# Patient Record
Sex: MALE | Race: BLACK OR AFRICAN AMERICAN | NOT HISPANIC OR LATINO | Employment: UNEMPLOYED | URBAN - METROPOLITAN AREA
[De-identification: names, ages, dates, MRNs, and addresses within clinical notes are randomized per-mention and may not be internally consistent; named-entity substitution may affect disease eponyms.]

---

## 2018-08-31 ENCOUNTER — HOSPITAL ENCOUNTER (INPATIENT)
Facility: OTHER | Age: 23
LOS: 1 days | Discharge: HOME | DRG: 440 | End: 2018-09-01
Attending: EMERGENCY MEDICINE | Admitting: HOSPITALIST
Payer: MEDICAID

## 2018-08-31 DIAGNOSIS — E86.0 DEHYDRATION: ICD-10-CM

## 2018-08-31 DIAGNOSIS — R73.9 HYPERGLYCEMIA: ICD-10-CM

## 2018-08-31 DIAGNOSIS — K85.90 ACUTE PANCREATITIS WITHOUT INFECTION OR NECROSIS, UNSPECIFIED PANCREATITIS TYPE: Primary | ICD-10-CM

## 2018-08-31 DIAGNOSIS — E10.65 TYPE 1 DIABETES MELLITUS WITH HYPERGLYCEMIA: ICD-10-CM

## 2018-08-31 DIAGNOSIS — R11.2 NON-INTRACTABLE VOMITING WITH NAUSEA, UNSPECIFIED VOMITING TYPE: ICD-10-CM

## 2018-08-31 LAB
ALBUMIN SERPL BCP-MCNC: 4.1 G/DL
ALLENS TEST: ABNORMAL
ALP SERPL-CCNC: 74 U/L
ALT SERPL W/O P-5'-P-CCNC: 11 U/L
ANION GAP SERPL CALC-SCNC: 12 MMOL/L
AST SERPL-CCNC: 11 U/L
B-OH-BUTYR BLD STRIP-SCNC: 0.5 MMOL/L
BACTERIA #/AREA URNS HPF: ABNORMAL /HPF
BASOPHILS # BLD AUTO: 0.01 K/UL
BASOPHILS NFR BLD: 0.2 %
BILIRUB SERPL-MCNC: 1.4 MG/DL
BILIRUB UR QL STRIP: NEGATIVE
BUN SERPL-MCNC: 11 MG/DL
CALCIUM SERPL-MCNC: 9.8 MG/DL
CHLORIDE SERPL-SCNC: 95 MMOL/L
CLARITY UR: CLEAR
CO2 SERPL-SCNC: 27 MMOL/L
COLOR UR: YELLOW
CREAT SERPL-MCNC: 1.2 MG/DL
DELSYS: ABNORMAL
DIFFERENTIAL METHOD: ABNORMAL
EOSINOPHIL # BLD AUTO: 0.1 K/UL
EOSINOPHIL NFR BLD: 1.7 %
ERYTHROCYTE [DISTWIDTH] IN BLOOD BY AUTOMATED COUNT: 13.6 %
EST. GFR  (AFRICAN AMERICAN): >60 ML/MIN/1.73 M^2
EST. GFR  (NON AFRICAN AMERICAN): >60 ML/MIN/1.73 M^2
ESTIMATED AVG GLUCOSE: 344 MG/DL
GLUCOSE SERPL-MCNC: 668 MG/DL
GLUCOSE SERPL-MCNC: >500 MG/DL (ref 70–110)
GLUCOSE UR QL STRIP: ABNORMAL
HBA1C MFR BLD HPLC: 13.6 %
HCO3 UR-SCNC: 29.7 MMOL/L (ref 24–28)
HCT VFR BLD AUTO: 39.2 %
HGB BLD-MCNC: 13.5 G/DL
HGB UR QL STRIP: NEGATIVE
KETONES UR QL STRIP: ABNORMAL
LEUKOCYTE ESTERASE UR QL STRIP: NEGATIVE
LIPASE SERPL-CCNC: 924 U/L
LYMPHOCYTES # BLD AUTO: 2.3 K/UL
LYMPHOCYTES NFR BLD: 34.8 %
MCH RBC QN AUTO: 27.6 PG
MCHC RBC AUTO-ENTMCNC: 34.4 G/DL
MCV RBC AUTO: 80 FL
MICROSCOPIC COMMENT: ABNORMAL
MODE: ABNORMAL
MONOCYTES # BLD AUTO: 0.4 K/UL
MONOCYTES NFR BLD: 6.7 %
NEUTROPHILS # BLD AUTO: 3.7 K/UL
NEUTROPHILS NFR BLD: 56.3 %
NITRITE UR QL STRIP: NEGATIVE
PCO2 BLDA: 55.6 MMHG (ref 35–45)
PH SMN: 7.33 [PH] (ref 7.35–7.45)
PH UR STRIP: 6 [PH] (ref 5–8)
PLATELET # BLD AUTO: 263 K/UL
PMV BLD AUTO: 11.6 FL
PO2 BLDA: 45 MMHG (ref 40–60)
POC BE: 4 MMOL/L
POC SATURATED O2: 76 % (ref 95–100)
POCT GLUCOSE: 316 MG/DL (ref 70–110)
POCT GLUCOSE: 326 MG/DL (ref 70–110)
POCT GLUCOSE: 414 MG/DL (ref 70–110)
POCT GLUCOSE: 455 MG/DL (ref 70–110)
POTASSIUM SERPL-SCNC: 4.3 MMOL/L
PROT SERPL-MCNC: 8.3 G/DL
PROT UR QL STRIP: NEGATIVE
RBC # BLD AUTO: 4.9 M/UL
RBC #/AREA URNS HPF: 0 /HPF (ref 0–4)
SAMPLE: ABNORMAL
SITE: ABNORMAL
SODIUM SERPL-SCNC: 134 MMOL/L
SP GR UR STRIP: <=1.005 (ref 1–1.03)
SP02: 100
URN SPEC COLLECT METH UR: ABNORMAL
UROBILINOGEN UR STRIP-ACNC: NEGATIVE EU/DL
WBC # BLD AUTO: 6.61 K/UL
WBC #/AREA URNS HPF: 4 /HPF (ref 0–5)
YEAST URNS QL MICRO: ABNORMAL

## 2018-08-31 PROCEDURE — 99900035 HC TECH TIME PER 15 MIN (STAT)

## 2018-08-31 PROCEDURE — 99222 1ST HOSP IP/OBS MODERATE 55: CPT | Mod: ,,, | Performed by: HOSPITALIST

## 2018-08-31 PROCEDURE — 82010 KETONE BODYS QUAN: CPT

## 2018-08-31 PROCEDURE — 25000003 PHARM REV CODE 250: Performed by: HOSPITALIST

## 2018-08-31 PROCEDURE — 36415 COLL VENOUS BLD VENIPUNCTURE: CPT

## 2018-08-31 PROCEDURE — 93005 ELECTROCARDIOGRAM TRACING: CPT

## 2018-08-31 PROCEDURE — 63600175 PHARM REV CODE 636 W HCPCS: Performed by: EMERGENCY MEDICINE

## 2018-08-31 PROCEDURE — 94761 N-INVAS EAR/PLS OXIMETRY MLT: CPT

## 2018-08-31 PROCEDURE — 83036 HEMOGLOBIN GLYCOSYLATED A1C: CPT

## 2018-08-31 PROCEDURE — 96374 THER/PROPH/DIAG INJ IV PUSH: CPT

## 2018-08-31 PROCEDURE — 93010 ELECTROCARDIOGRAM REPORT: CPT | Mod: ,,, | Performed by: INTERNAL MEDICINE

## 2018-08-31 PROCEDURE — 85025 COMPLETE CBC W/AUTO DIFF WBC: CPT

## 2018-08-31 PROCEDURE — 25000003 PHARM REV CODE 250: Performed by: EMERGENCY MEDICINE

## 2018-08-31 PROCEDURE — 82962 GLUCOSE BLOOD TEST: CPT

## 2018-08-31 PROCEDURE — 96375 TX/PRO/DX INJ NEW DRUG ADDON: CPT

## 2018-08-31 PROCEDURE — 96361 HYDRATE IV INFUSION ADD-ON: CPT

## 2018-08-31 PROCEDURE — 80053 COMPREHEN METABOLIC PANEL: CPT

## 2018-08-31 PROCEDURE — 82803 BLOOD GASES ANY COMBINATION: CPT

## 2018-08-31 PROCEDURE — 81000 URINALYSIS NONAUTO W/SCOPE: CPT

## 2018-08-31 PROCEDURE — 11000001 HC ACUTE MED/SURG PRIVATE ROOM

## 2018-08-31 PROCEDURE — 99285 EMERGENCY DEPT VISIT HI MDM: CPT | Mod: 25

## 2018-08-31 PROCEDURE — 63600175 PHARM REV CODE 636 W HCPCS: Performed by: HOSPITALIST

## 2018-08-31 PROCEDURE — 83690 ASSAY OF LIPASE: CPT

## 2018-08-31 RX ORDER — PROCHLORPERAZINE EDISYLATE 5 MG/ML
5 INJECTION INTRAMUSCULAR; INTRAVENOUS EVERY 6 HOURS PRN
Status: DISCONTINUED | OUTPATIENT
Start: 2018-08-31 | End: 2018-09-01 | Stop reason: HOSPADM

## 2018-08-31 RX ORDER — ONDANSETRON 2 MG/ML
4 INJECTION INTRAMUSCULAR; INTRAVENOUS
Status: COMPLETED | OUTPATIENT
Start: 2018-08-31 | End: 2018-08-31

## 2018-08-31 RX ORDER — IBUPROFEN 200 MG
16 TABLET ORAL
Status: DISCONTINUED | OUTPATIENT
Start: 2018-08-31 | End: 2018-09-01 | Stop reason: HOSPADM

## 2018-08-31 RX ORDER — GLUCAGON 1 MG
1 KIT INJECTION
Status: DISCONTINUED | OUTPATIENT
Start: 2018-08-31 | End: 2018-09-01 | Stop reason: HOSPADM

## 2018-08-31 RX ORDER — INSULIN ASPART 100 [IU]/ML
0-5 INJECTION, SOLUTION INTRAVENOUS; SUBCUTANEOUS
Status: DISCONTINUED | OUTPATIENT
Start: 2018-08-31 | End: 2018-09-01 | Stop reason: HOSPADM

## 2018-08-31 RX ORDER — SODIUM CHLORIDE 0.9 % (FLUSH) 0.9 %
3 SYRINGE (ML) INJECTION
Status: DISCONTINUED | OUTPATIENT
Start: 2018-08-31 | End: 2018-09-01 | Stop reason: HOSPADM

## 2018-08-31 RX ORDER — INSULIN ASPART 100 [IU]/ML
5 INJECTION, SOLUTION INTRAVENOUS; SUBCUTANEOUS
Status: DISCONTINUED | OUTPATIENT
Start: 2018-08-31 | End: 2018-09-01 | Stop reason: HOSPADM

## 2018-08-31 RX ORDER — IBUPROFEN 200 MG
24 TABLET ORAL
Status: DISCONTINUED | OUTPATIENT
Start: 2018-08-31 | End: 2018-09-01 | Stop reason: HOSPADM

## 2018-08-31 RX ORDER — INSULIN LISPRO 100 [IU]/ML
INJECTION, SOLUTION INTRAVENOUS; SUBCUTANEOUS
Status: ON HOLD | COMMUNITY
End: 2020-06-08

## 2018-08-31 RX ORDER — ONDANSETRON 2 MG/ML
4 INJECTION INTRAMUSCULAR; INTRAVENOUS EVERY 8 HOURS PRN
Status: DISCONTINUED | OUTPATIENT
Start: 2018-08-31 | End: 2018-09-01 | Stop reason: HOSPADM

## 2018-08-31 RX ORDER — SODIUM CHLORIDE 9 MG/ML
INJECTION, SOLUTION INTRAVENOUS CONTINUOUS
Status: DISCONTINUED | OUTPATIENT
Start: 2018-08-31 | End: 2018-09-01 | Stop reason: HOSPADM

## 2018-08-31 RX ORDER — MORPHINE SULFATE 4 MG/ML
4 INJECTION, SOLUTION INTRAMUSCULAR; INTRAVENOUS
Status: COMPLETED | OUTPATIENT
Start: 2018-08-31 | End: 2018-08-31

## 2018-08-31 RX ADMIN — INSULIN HUMAN 6 UNITS: 100 INJECTION, SOLUTION PARENTERAL at 07:08

## 2018-08-31 RX ADMIN — INSULIN ASPART 2 UNITS: 100 INJECTION, SOLUTION INTRAVENOUS; SUBCUTANEOUS at 09:08

## 2018-08-31 RX ADMIN — MORPHINE SULFATE 4 MG: 4 INJECTION INTRAVENOUS at 10:08

## 2018-08-31 RX ADMIN — SODIUM CHLORIDE: 0.9 INJECTION, SOLUTION INTRAVENOUS at 03:08

## 2018-08-31 RX ADMIN — ONDANSETRON 4 MG: 2 INJECTION INTRAMUSCULAR; INTRAVENOUS at 08:08

## 2018-08-31 RX ADMIN — SODIUM CHLORIDE: 0.9 INJECTION, SOLUTION INTRAVENOUS at 09:08

## 2018-08-31 RX ADMIN — INSULIN DETEMIR 15 UNITS: 100 INJECTION, SOLUTION SUBCUTANEOUS at 05:08

## 2018-08-31 RX ADMIN — SODIUM CHLORIDE 1000 ML: 0.9 INJECTION, SOLUTION INTRAVENOUS at 07:08

## 2018-08-31 RX ADMIN — SODIUM CHLORIDE 1000 ML: 0.9 INJECTION, SOLUTION INTRAVENOUS at 08:08

## 2018-08-31 NOTE — ED PROVIDER NOTES
Encounter Date: 8/31/2018    SCRIBE #1 NOTE: Sam CALDERA, am scribing for, and in the presence of, Dr. Mendez.       History     Chief Complaint   Patient presents with    Abdominal Pain     pt with abdomin pain and vomiting starting at 4 am . pt with elevated bg this am.     Seen by provider: 7:32 AM    Patient is a 22 y.o. male with a history of DM who presents to the ED with complaint of generalized abdominal pain, which began this morning, approximately 3.5 hours ago. He reports associated nausea, vomiting, and dizziness. He also notes diarrhea and black stools. He denies fever, chills, chest pain, shortness of breath, cough, congestion, runny nose, burning on urination, frequency, or urgency. Patient states he did not take his diabetes medications this morning, as he was rushing to work and forgot to bring it in his bag. He reports onset of symptoms began while waiting for the bus to work. He denies any other missed doses over the past two days and took his medications last night as directed. Patient states he take Humalog. He reports compliance with a diabetic diet. He states he has been admitted to the hospital for elevated blood sugar in the past, however he is not familiar with the term DKA. He reports no past abdominal surgeries. He is allergic to shrimp. He denies use of tobacco, alcohol, or illicit drugs.       The history is provided by the patient.     Review of patient's allergies indicates:   Allergen Reactions    Shrimp      Past Medical History:   Diagnosis Date    Diabetes mellitus      History reviewed. No pertinent surgical history.  History reviewed. No pertinent family history.  Social History     Tobacco Use    Smoking status: Never Smoker    Smokeless tobacco: Never Used   Substance Use Topics    Alcohol use: Yes     Comment: occasionally    Drug use: Not on file     Review of Systems   Constitutional: Negative for chills and fever.   HENT: Negative for congestion and sore  throat.    Eyes: Negative for photophobia and redness.   Respiratory: Negative for cough and shortness of breath.    Cardiovascular: Negative for chest pain.   Gastrointestinal: Positive for abdominal pain (generalized), diarrhea (+ black stool), nausea and vomiting.   Genitourinary: Negative for dysuria.   Musculoskeletal: Negative for back pain.   Skin: Negative for rash.   Neurological: Positive for dizziness. Negative for weakness, light-headedness and headaches.   Psychiatric/Behavioral: Negative for confusion.       Physical Exam     Initial Vitals [08/31/18 0709]   BP Pulse Resp Temp SpO2   (!) 146/95 96 16 96.5 °F (35.8 °C) 100 %      MAP       --         Physical Exam    Nursing note and vitals reviewed.  Constitutional: He appears well-developed and well-nourished. He is not diaphoretic. No distress.   HENT:   Head: Normocephalic and atraumatic.   Mouth/Throat: Oropharynx is clear and moist.   Eyes: Conjunctivae and EOM are normal. Pupils are equal, round, and reactive to light.   Neck: Normal range of motion. Neck supple.   Cardiovascular: Normal rate, regular rhythm, S1 normal, S2 normal and normal heart sounds. Exam reveals no gallop and no friction rub.    No murmur heard.  Pulmonary/Chest: Breath sounds normal. No respiratory distress. He has no wheezes. He has no rhonchi. He has no rales.   Abdominal: Soft. Bowel sounds are normal. There is generalized tenderness. There is no rebound and no guarding.   Musculoskeletal: Normal range of motion. He exhibits no edema or tenderness.   No lower extremity edema.    Lymphadenopathy:     He has no cervical adenopathy.   Neurological: He is alert and oriented to person, place, and time.   Skin: Skin is warm and dry. Capillary refill takes less than 2 seconds. No rash noted. No pallor.   Psychiatric: He has a normal mood and affect. His behavior is normal. Judgment and thought content normal.         ED Course   Procedures  Labs Reviewed   CBC W/ AUTO  DIFFERENTIAL - Abnormal; Notable for the following components:       Result Value    Hemoglobin 13.5 (*)     Hematocrit 39.2 (*)     MCV 80 (*)     All other components within normal limits   COMPREHENSIVE METABOLIC PANEL - Abnormal; Notable for the following components:    Sodium 134 (*)     Glucose 668 (*)     Total Bilirubin 1.4 (*)     All other components within normal limits    Narrative:      glucose  critical result(s) called and verbal readback obtained from   jonathan medrano rn er @0857, 08/31/2018 08:59   URINALYSIS - Abnormal; Notable for the following components:    Specific Gravity, UA <=1.005 (*)     Glucose, UA 3+ (*)     Ketones, UA Trace (*)     All other components within normal limits   LIPASE - Abnormal; Notable for the following components:    Lipase 924 (*)     All other components within normal limits   URINALYSIS MICROSCOPIC - Abnormal; Notable for the following components:    Bacteria, UA Moderate (*)     All other components within normal limits   ISTAT PROCEDURE - Abnormal; Notable for the following components:    POC PH 7.335 (*)     POC PCO2 55.6 (*)     POC HCO3 29.7 (*)     POC SATURATED O2 76 (*)     All other components within normal limits   POCT GLUCOSE - Abnormal; Notable for the following components:    POCT Glucose 455 (*)     All other components within normal limits   POCT GLUCOSE - Abnormal; Notable for the following components:    POCT Glucose 414 (*)     All other components within normal limits   BETA - HYDROXYBUTYRATE, SERUM     EKG Readings: (Independently Interpreted)   Sinus rhythm. Rate of 84. No STEMI.       Imaging Results          X-Ray Chest AP Portable (Final result)  Result time 08/31/18 07:57:18    Final result by Xin Swanson MD (08/31/18 07:57:18)                 Impression:      No acute intrathoracic abnormality identified on this single radiographic view of the chest.      Electronically signed by: Xin Swanson MD  Date:    08/31/2018  Time:    07:57              Narrative:    EXAMINATION:  XR CHEST AP PORTABLE    CLINICAL HISTORY:  hyperglycemia;    TECHNIQUE:  Single frontal view of the chest was performed.    COMPARISON:  None    FINDINGS:  The cardiomediastinal silhouette is within normal limits. The visualized airway is unremarkable.  The lungs appear symmetrically aerated without definite focal alveolar consolidation. No large pleural effusion or pneumothorax is appreciated.Visualized osseous structures demonstrate no acute abnormality.                                 Medical Decision Making:   Independently Interpreted Test(s):   I have ordered and independently interpreted EKG Reading(s) - see prior notes  Clinical Tests:   Lab Tests: Reviewed and Ordered  Radiological Study: Reviewed and Ordered  Medical Tests: Reviewed and Ordered  ED Management:  12:20 PM - Consulted and discussed the case with Dr. Lee. Will admit the patient to Dr. Lee.            Scribe Attestation:   Scribe #1: I performed the above scribed service and the documentation accurately describes the services I performed. I attest to the accuracy of the note.    Attending Attestation:           Physician Attestation for Scribe:  Physician Attestation Statement for Scribe #1: I, Dr. Mendez, reviewed documentation, as scribed by Sam Li in my presence, and it is both accurate and complete.         Attending ED Notes:   Emergent evaluation of a 22-year-old male with complaint of nausea, vomiting and generalized abdominal pain.  Patient is afebrile, nontoxic-appearing stable vital signs. Urinary analysis reveals 3+ sugar and trace ketones moderate bacteria.  Negative leukocytes, negative nitrites.  PH on ABG is 7.335.  Patient has no elevation of white blood cell count.  H&H is 13.5 and 39.2.  Sodium is 134.  Blood glucose is 668.  Total bili of 1.4.  Lipase is 924.  Beta hydroxybutyrate is 0.5.  No acute findings on chest x-ray.  Given patient's history of present illness, clinical  presentation and physical exam I do not suspect gallbladder disease.  The patient is extensively counseled on his diagnosis and treatment including all diagnostic, laboratory and physical exam findings.  The patient is admitted in stable condition.             Clinical Impression:     1. Acute pancreatitis without infection or necrosis, unspecified pancreatitis type    2. Hyperglycemia    3. Type 1 diabetes mellitus with hyperglycemia    4. Dehydration    5. Non-intractable vomiting with nausea, unspecified vomiting type                                Ernie Laurent MD  08/31/18 3311

## 2018-08-31 NOTE — ED NOTES
Report called to floor. Alesia. Pt going to room 313. AO4. Ambulates independently. Pt has been resting comfortably in bed. Unlabored breathing. Transport requested.

## 2018-09-01 VITALS
HEIGHT: 73 IN | RESPIRATION RATE: 18 BRPM | DIASTOLIC BLOOD PRESSURE: 76 MMHG | OXYGEN SATURATION: 98 % | WEIGHT: 185.19 LBS | HEART RATE: 82 BPM | BODY MASS INDEX: 24.54 KG/M2 | SYSTOLIC BLOOD PRESSURE: 121 MMHG | TEMPERATURE: 98 F

## 2018-09-01 LAB
ALBUMIN SERPL BCP-MCNC: 3.2 G/DL
ALP SERPL-CCNC: 61 U/L
ALT SERPL W/O P-5'-P-CCNC: 11 U/L
ANION GAP SERPL CALC-SCNC: 11 MMOL/L
AST SERPL-CCNC: 13 U/L
BILIRUB SERPL-MCNC: 1.7 MG/DL
BUN SERPL-MCNC: 5 MG/DL
CALCIUM SERPL-MCNC: 8.9 MG/DL
CHLORIDE SERPL-SCNC: 104 MMOL/L
CO2 SERPL-SCNC: 23 MMOL/L
CREAT SERPL-MCNC: 0.7 MG/DL
EST. GFR  (AFRICAN AMERICAN): >60 ML/MIN/1.73 M^2
EST. GFR  (NON AFRICAN AMERICAN): >60 ML/MIN/1.73 M^2
GLUCOSE SERPL-MCNC: 214 MG/DL
LIPASE SERPL-CCNC: 26 U/L
MAGNESIUM SERPL-MCNC: 1.8 MG/DL
PHOSPHATE SERPL-MCNC: 2.8 MG/DL
POCT GLUCOSE: 188 MG/DL (ref 70–110)
POTASSIUM SERPL-SCNC: 3.7 MMOL/L
PROT SERPL-MCNC: 6.9 G/DL
SODIUM SERPL-SCNC: 138 MMOL/L

## 2018-09-01 PROCEDURE — 63600175 PHARM REV CODE 636 W HCPCS: Performed by: HOSPITALIST

## 2018-09-01 PROCEDURE — 25000003 PHARM REV CODE 250: Performed by: HOSPITALIST

## 2018-09-01 PROCEDURE — 99238 HOSP IP/OBS DSCHRG MGMT 30/<: CPT | Mod: ,,, | Performed by: HOSPITALIST

## 2018-09-01 PROCEDURE — 83735 ASSAY OF MAGNESIUM: CPT

## 2018-09-01 PROCEDURE — 94761 N-INVAS EAR/PLS OXIMETRY MLT: CPT

## 2018-09-01 PROCEDURE — 80053 COMPREHEN METABOLIC PANEL: CPT

## 2018-09-01 PROCEDURE — 36415 COLL VENOUS BLD VENIPUNCTURE: CPT

## 2018-09-01 PROCEDURE — 84100 ASSAY OF PHOSPHORUS: CPT

## 2018-09-01 PROCEDURE — 83690 ASSAY OF LIPASE: CPT

## 2018-09-01 RX ORDER — INSULIN GLARGINE 100 [IU]/ML
15 INJECTION, SOLUTION SUBCUTANEOUS NIGHTLY
Qty: 15 ML | Refills: 3 | Status: ON HOLD | OUTPATIENT
Start: 2018-09-01 | End: 2020-06-14

## 2018-09-01 RX ADMIN — INSULIN ASPART 5 UNITS: 100 INJECTION, SOLUTION INTRAVENOUS; SUBCUTANEOUS at 09:09

## 2018-09-01 RX ADMIN — INSULIN DETEMIR 15 UNITS: 100 INJECTION, SOLUTION SUBCUTANEOUS at 09:09

## 2018-09-01 RX ADMIN — SODIUM CHLORIDE: 0.9 INJECTION, SOLUTION INTRAVENOUS at 02:09

## 2018-09-01 NOTE — PLAN OF CARE
LMSW unable to update patient PCP as a review error in epic.     D/c summary given to patient.     No cm needs for discharge.     Patients family will assist him with getting home.      09/01/18 1030   Final Note   Assessment Type Final Discharge Note   Discharge Disposition Home   What phone number can be called within the next 1-3 days to see how you are doing after discharge? [phone]

## 2018-09-01 NOTE — ASSESSMENT & PLAN NOTE
Start basal insulin with Levemir 10U daily  Start prandial insulin of  Aspart 5U  PRN insulin   Obtain HBA1c

## 2018-09-01 NOTE — SUBJECTIVE & OBJECTIVE
Past Medical History:   Diagnosis Date    Diabetes mellitus        History reviewed. No pertinent surgical history.    Review of patient's allergies indicates:   Allergen Reactions    Shrimp        No current facility-administered medications on file prior to encounter.      Current Outpatient Medications on File Prior to Encounter   Medication Sig    insulin lispro (HUMALOG) 100 unit/mL injection Inject into the skin 3 (three) times daily before meals.     Family History     None        Tobacco Use    Smoking status: Never Smoker    Smokeless tobacco: Never Used   Substance and Sexual Activity    Alcohol use: Yes     Comment: occasionally    Drug use: Not on file    Sexual activity: Not on file     Review of Systems   Constitutional: Negative for activity change, chills, fatigue and fever.   HENT: Negative for congestion and rhinorrhea.    Eyes: Negative for visual disturbance.   Respiratory: Negative for cough, chest tightness and shortness of breath.    Cardiovascular: Negative for chest pain and leg swelling.   Gastrointestinal: Positive for abdominal pain, diarrhea, nausea and vomiting.   Endocrine: Positive for polyuria.   Genitourinary: Negative for difficulty urinating and dysuria.   Musculoskeletal: Negative for arthralgias.   Skin: Negative for rash.   Neurological: Negative for dizziness, weakness and headaches.   Psychiatric/Behavioral: Negative for agitation and confusion.     Objective:     Vital Signs (Most Recent):  Temp: 98 °F (36.7 °C) (08/31/18 1912)  Pulse: 79 (08/31/18 1912)  Resp: 16 (08/31/18 1625)  BP: 120/66 (08/31/18 1912)  SpO2: 99 % (08/31/18 1912) Vital Signs (24h Range):  Temp:  [96.5 °F (35.8 °C)-98.2 °F (36.8 °C)] 98 °F (36.7 °C)  Pulse:  [79-96] 79  Resp:  [12-18] 16  SpO2:  [97 %-100 %] 99 %  BP: (109-146)/(56-98) 120/66     Weight: 81.6 kg (180 lb)  Body mass index is 23.75 kg/m².    Physical Exam   Constitutional: He is oriented to person, place, and time. No distress.    Thin well-appearing male in NAD cooperative with exam   HENT:   Head: Normocephalic and atraumatic.   Mouth/Throat: Oropharynx is clear and moist.   Neck: Normal range of motion. Neck supple. No thyromegaly present.   Cardiovascular: Normal rate, regular rhythm, normal heart sounds and intact distal pulses. Exam reveals no gallop and no friction rub.   No murmur heard.  Pulmonary/Chest: Effort normal and breath sounds normal.   Abdominal: Soft. Bowel sounds are normal. He exhibits no distension. There is no tenderness.   Musculoskeletal: Normal range of motion. He exhibits no edema.   Lymphadenopathy:     He has no cervical adenopathy.   Neurological: He is alert and oriented to person, place, and time.   Skin: Skin is warm and dry.   Psychiatric: He has a normal mood and affect. His behavior is normal. Judgment and thought content normal.        Significant Labs:   CBC:   Recent Labs   Lab  08/31/18   0758   WBC  6.61   HGB  13.5*   HCT  39.2*   PLT  263     CMP:   Recent Labs   Lab  08/31/18   0758   NA  134*   K  4.3   CL  95   CO2  27   GLU  668*   BUN  11   CREATININE  1.2   CALCIUM  9.8   PROT  8.3   ALBUMIN  4.1   BILITOT  1.4*   ALKPHOS  74   AST  11   ALT  11   ANIONGAP  12   EGFRNONAA  >60

## 2018-09-01 NOTE — HOSPITAL COURSE
The patient was aggressively hydrated and diet advanced.  The was started on basal insulin and blood glucose improved.  HBA1c obtained and was markedly elevated to 13.6.  The patient only takes insulin in a sliding scale fashion as instructed by his PCP.  The patient was given educational materials and there was an extensive discussion regarding appropriate management of his disease.  The patient was prescribed Lantus 15U QHS and is instructed to follow up with his PCP within a week.

## 2018-09-01 NOTE — H&P
[facility] Hospital Medicine  History & Physical    Patient Name: Pratik Torres  MRN: [id number]  Admission Date: 8/31/2018  Attending Physician: Martha Lee MD   Primary Care Provider: Primary Doctor No         Patient information was obtained from patient and ER records.     Subjective:     Principal Problem:Acute pancreatitis without infection or necrosis    Chief Complaint:   Chief Complaint   Patient presents with    Abdominal Pain     pt with abdomin pain and vomiting starting at 4 am . pt with elevated bg this am.        HPI: This is a 22 year old male with Type 1 DM who presents with a 1 day complaint of nausea, vomiting, and abdominal pain.  The patient denies unusual ingestions or sick contacts.  He denies non-compliance with medications and notes that his blood glucose is usually well controlled.  The patient admits diarrheal stools for about a month since he started taking Truvada per his PCP.  Upon arrival to the ED, the patient had a blood glucose of 668 and a lipase of 924.    Past Medical History:   Diagnosis Date    Diabetes mellitus        History reviewed. No pertinent surgical history.    Review of patient's allergies indicates:   Allergen Reactions    Shrimp        No current facility-administered medications on file prior to encounter.      Current Outpatient Medications on File Prior to Encounter   Medication Sig    insulin lispro (HUMALOG) 100 unit/mL injection Inject into the skin 3 (three) times daily before meals.     Family History     None        Tobacco Use    Smoking status: Never Smoker    Smokeless tobacco: Never Used   Substance and Sexual Activity    Alcohol use: Yes     Comment: occasionally    Drug use: Not on file    Sexual activity: Not on file     Review of Systems   Constitutional: Negative for activity change, chills, fatigue and fever.   HENT: Negative for congestion and rhinorrhea.    Eyes: Negative for visual disturbance.   Respiratory:  Negative for cough, chest tightness and shortness of breath.    Cardiovascular: Negative for chest pain and leg swelling.   Gastrointestinal: Positive for abdominal pain, diarrhea, nausea and vomiting.   Endocrine: Positive for polyuria.   Genitourinary: Negative for difficulty urinating and dysuria.   Musculoskeletal: Negative for arthralgias.   Skin: Negative for rash.   Neurological: Negative for dizziness, weakness and headaches.   Psychiatric/Behavioral: Negative for agitation and confusion.     Objective:     Vital Signs (Most Recent):  Temp: 98 °F (36.7 °C) (08/31/18 1912)  Pulse: 79 (08/31/18 1912)  Resp: 16 (08/31/18 1625)  BP: 120/66 (08/31/18 1912)  SpO2: 99 % (08/31/18 1912) Vital Signs (24h Range):  Temp:  [96.5 °F (35.8 °C)-98.2 °F (36.8 °C)] 98 °F (36.7 °C)  Pulse:  [79-96] 79  Resp:  [12-18] 16  SpO2:  [97 %-100 %] 99 %  BP: (109-146)/(56-98) 120/66     Weight: 81.6 kg (180 lb)  Body mass index is 23.75 kg/m².    Physical Exam   Constitutional: He is oriented to person, place, and time. No distress.   Thin well-appearing male in NAD cooperative with exam   HENT:   Head: Normocephalic and atraumatic.   Mouth/Throat: Oropharynx is clear and moist.   Neck: Normal range of motion. Neck supple. No thyromegaly present.   Cardiovascular: Normal rate, regular rhythm, normal heart sounds and intact distal pulses. Exam reveals no gallop and no friction rub.   No murmur heard.  Pulmonary/Chest: Effort normal and breath sounds normal.   Abdominal: Soft. Bowel sounds are normal. He exhibits no distension. There is no tenderness.   Musculoskeletal: Normal range of motion. He exhibits no edema.   Lymphadenopathy:     He has no cervical adenopathy.   Neurological: He is alert and oriented to person, place, and time.   Skin: Skin is warm and dry.   Psychiatric: He has a normal mood and affect. His behavior is normal. Judgment and thought content normal.        Significant Labs:   CBC:   Recent Labs   Lab  08/31/18    0758   WBC  6.61   HGB  13.5*   HCT  39.2*   PLT  263     CMP:   Recent Labs   Lab  08/31/18   0758   NA  134*   K  4.3   CL  95   CO2  27   GLU  668*   BUN  11   CREATININE  1.2   CALCIUM  9.8   PROT  8.3   ALBUMIN  4.1   BILITOT  1.4*   ALKPHOS  74   AST  11   ALT  11   ANIONGAP  12   EGFRNONAA  >60         Assessment/Plan:     * Acute pancreatitis without infection or necrosis    Full liquid diet  Aggressive hydration with NS at 200cc/hr  Follow electrolytes  Follow lipase          Type 1 diabetes mellitus with hyperglycemia    Start basal insulin with Levemir 10U daily  Start prandial insulin of  Aspart 5U  PRN insulin   Obtain HBA1c            VTE Risk Mitigation (From admission, onward)        Ordered     IP VTE LOW RISK PATIENT  Once      08/31/18 1526     Place sequential compression device  Until discontinued      08/31/18 1526     Place ARIS hose  Until discontinued      08/31/18 1526     Place sequential compression device  Until discontinued      08/31/18 1526             Martha Lee MD  Department of Hospital Medicine   [facility]

## 2018-09-01 NOTE — DISCHARGE INSTRUCTIONS
Thank you for choosing [facility] as your Health Care Provider. [facility] strives to provide the best healthcare available to you. In the next few days you may receive a Survey, either by mail or email,  asking you to rate our care that was provided to you during your stay.  Please return the survey to us, as your feedback is important. We aim to meet your expectations of safe, quality health care.    From your [facility] Team.  Pancreatitis  The pancreas is an organ in the abdomen that secretes digestive juices into the stomach. Pancreatitis is an inflammation of the pancreas. In many cases, it is caused when the duct that connects the pancreas and gallbladder is blocked by a gallstone. Heavy alcohol use can also cause it. Less common causes can include medications, trauma, certain medical procedures, viruses, and toxins. Sometimes the cause of pancreatitis cannot be found.  Symptoms of pancreatitis include:  · Severe abdominal pain   · Nausea, vomiting  · Severe indigestion  · Racing heart  · Fever  At first, pancreatitis may be treated in the hospital. There, fluids and medications can be provided. The underlying cause of the problem must also be treated to prevent further problems. If gallstones are the cause, you and your healthcare provider can discuss options for treating them. If alcohol is the cause, talk to your healthcare provider about a program to help you stop drinking.  Home care  · Avoid alcohol.  · Rest in bed or sit up in a chair until you feel better.  · Take medicines as prescribed. If you were given an antibiotic for infection, take it until it is gone, even if you feel better. Let your healthcare provider know if you vomit up your medicine.  · Eating and drinking:  ¨ If instructed, avoid eating or drinking until nausea and vomiting go away.  ¨ Try sipping clear liquids to prevent dehydration.   ¨ When you begin eating again, start with small amounts. Have small,  "more frequent meals rather than larger meals.  Follow-up care  Follow up with your healthcare provider as advised.  When to seek medical advice  Call your healthcare provider for if any of the following:  · Continued or worsening pain  · Repeated vomiting  · Dizziness, weakness  · Fever of 100.4º F (38.0º C) or higher, or as directed by your healthcare provider  · Severe muscle cramps  Call 911  Get emergency medical care for any of the following:  · Vomiting blood or large amounts of blood in stool  · Seizure  · Loss of consciousness  Date Last Reviewed: 6/15/2015  © 6409-4763 [employer]. [address] [place] [address]. All rights reserved. This information is not intended as a substitute for professional medical care. Always follow your healthcare professional's instructions.        Diabetes with High Blood Sugar  You have been treated for high blood sugar (hyperglycemia). This may be because of an infection or other illness, eating too many sweets or starches, or not taking enough insulin.  Home care  Monitor and write down your blood sugar level at least twice a day. Do this before breakfast and before dinner. If you take insulin, record your routine insulin dose as well. Also record any additional doses required based on your sliding scale. Do this for the next 3 to 5 days.  High blood sugar may cause symptoms that you can learn to recognize, such as:  · Frequent urination  · Thirst  · Dizziness  · Headache  · Shortness of breath  · Breath that smells fruity  · Nausea or vomiting  · Abdominal pain  · Drowsiness or loss of consciousness  If you have high-blood-sugar symptoms, use a blood or urine test to find out what your blood sugar level is. If it is above your usual range, use the "sliding scale" regular insulin dose prescribed by your healthcare provider. If you were not given a range for your insulin dose, contact your healthcare provider for more advice.  Follow-up care  Follow " up with your healthcare provider, or as advised. You may need to meet with your healthcare provider in the next week. You will likely review your blood sugar records together. You may also talk to your provider about adjusting your dose of insulin or other medicine for blood sugar.  When to seek medical advice  Call your healthcare provider right away if these occur:  · High blood sugar symptoms (Symptoms are described above.)  · Blood sugar over 300 mg/dl If you cant reach your healthcare provider, go to a hospital emergency room or urgent care center  Date Last Reviewed: 6/1/2016  © 4400-1291 [employer]. [address] [place] [address]. All rights reserved. This information is not intended as a substitute for professional medical care. Always follow your healthcare professional's instructions.      Understanding Carbohydrates, Fats, and Protein  Food is a source of fuel and nourishment for your body. Its also a source of pleasure. Having diabetes doesnt mean you have to eat special foods or give up desserts. Instead, your dietitian can show you how to plan meals to suit your body. To start, learn how different foods affect blood sugar.  Carbohydrates  Carbohydrates are the main source of fuel for the body. Carbohydrates raise blood sugar. Many people think carbohydrates are only found in pasta or bread. But carbohydrates are actually in many kinds of foods:  · Sugars occur naturally in foods such as fruit, milk, honey, and molasses. Sugars can also be added to many foods, from cereals and yogurt to candy and desserts. Sugars raise blood sugar.  · Starches are found in bread, cereals, pasta, and dried beans. Theyre also found in corn, peas, potatoes, yam, acorn squash, and butternut squash. Starches also raise blood sugar.   · Fiber is found in foods such as vegetables, fruits, beans, and whole grains. Unlike other carbs, fiber isnt digested or absorbed. So it doesnt raise blood  sugar. In fact, fiber can help keep blood sugar from rising too fast. It also helps keep blood cholesterol at a healthy level.  Did you know?  Even though carbohydrates raise blood sugar, its best to have some in every meal. They are an important part of a healthy diet.   Fat  Fat is an energy source that can be stored until needed. Fat does not raise blood sugar. However, it can raise blood cholesterol, increasing the risk of heart disease. Fat is also high in calories, which can cause weight gain. Not all types of fat are the same.  More Healthy:  · Monounsaturated fats are mostly found in vegetable oils, such as olive, canola, and peanut oils. They are also found in avocados and some nuts. Monounsaturated fats are healthy for your heart. Thats because they lower LDL (unhealthy) cholesterol.  · Polyunsaturated fats are mostly found in vegetable oils, such as corn, safflower, and soybean oils. They are also found in some seeds, nuts, and fish. Polyunsaturated fats lower LDL (unhealthy) cholesterol. So, choosing them instead of saturated fats is healthy for your heart. Certain unsaturated fats can help lower triglycerides.   Less Healthy:  · Saturated fats are found in animal products, such as meat, poultry, whole milk, lard, and butter. Saturated fats raise LDL cholesterol and are not healthy for your heart.  · Hydrogenated oils and trans fats are formed when vegetable oils are processed into solid fats. They are found in many processed foods. Hydrogenated oils and trans fats raise LDL cholesterol and lower HDL (healthy) cholesterol. They are not healthy for your heart.  Protein  Protein helps the body build and repair muscle and other tissue. Protein has little or no effect on blood sugar. However, many foods that contain protein also contain saturated fat. By choosing low-fat protein sources, you can get the benefits of protein without the extra fat:  · Plant protein is found in dry beans and peas, nuts, and  soy products, such as tofu and soymilk. These sources tend to be cholesterol-free and low in saturated fat.  · Animal protein is found in fish, poultry, meat, cheese, milk, and eggs. These contain cholesterol and can be high in saturated fat. Aim for lean, lower-fat choices.  Date Last Reviewed: 3/1/2016  © 5816-5148 [employer]. [address] [place] [address], [place] [address]. All rights reserved. This information is not intended as a substitute for professional medical care. Always follow your healthcare professional's instructions.      Diet: Diabetes  Food is an important tool that you can use to control diabetes and stay healthy. Eating well-balanced meals in the correct amounts will help you control your blood glucose levels and prevent low blood sugar reactions. It will also help you reduce the health risks of diabetes. There is no one specific diet that is right for everyone with diabetes. But there are general guidelines to follow. A registered dietitian (RD) will create a tailored diet approach thats just right for you. He or she will also help you plan healthy meals and snacks. If you have any questions, call your dietitian for advice.     Guidelines for success  Talk with your healthcare provider before starting a diabetes diet or weight loss program. If you haven't talked with a dietitian yet, ask your provider for a referral. The following guidelines can help you succeed:  · Select foods from the 6 food groups below. Your dietitian will help you find food choices within each group. He or she will also show you serving sizes and how many servings you can have at each meal.  ¨ Grains, beans, and starchy vegetables  ¨ Vegetables  ¨ Fruit  ¨ Milk or yogurt  ¨ Meat, poultry, fish, or tofu  ¨ Healthy fats  · Check your blood sugar levels as directed by your provider. Take any medicine as prescribed by your provider.  · Learn to read food labels and pick the right portion sizes.  · Eat only the  amount of food in your meal plan. Eat about the same amount of food at regular times each day. Dont skip meals. Eat meals 4 to 5 hours apart, with snacks in between.  · Limit alcohol. It raises blood sugar levels. Drink water or calorie-free diet drinks that use safe sweeteners.  · Eat less fat to help lower your risk of heart disease. Use nonfat or low-fat dairy products and lean meats. Avoid fried foods. Use cooking oils that are unsaturated, such as olive, canola, or peanut oil.  · Talk with your dietitian about safe sugar substitutes.  · Avoid added salt. It can contribute to high blood pressure, which can cause heart disease. People with diabetes already have a risk of high blood pressure and heart disease.  · Stay at a healthy weight. If you need to lose weight, cut down on your portion sizes. But dont skip meals. Exercise is an important part of any weight management program. Talk with your provider about an exercise program thats right for you.  · For more information about the best diet plan for you, talk with a registered dietitian (RD). To find an RD in your area, contact:  ¨ Academy of Nutrition and Dietetics www.eatright.org  ¨ The American Diabetes Association [phone] www.diabetes.org  Date Last Reviewed: 8/1/2016 © 2000-2017 [employer]. [address] [place] [address], [place] [address]. All rights reserved. This information is not intended as a substitute for professional medical care. Always follow your healthcare professional's instructions.      Diabetes: Meal Planning    You can help keep your blood sugar level in your target range by eating healthy foods. Your healthcare team can help you create a low-fat, nutritious meal plan. Take an active role in your diabetes management by following your meal plan and working with your healthcare team.  Make your meal plan  A meal plan gives guidelines for the types and amounts of food you should eat. The goal is to balance food and insulin  (or other diabetes medications) so your blood sugars will be in your target range. Your dietitian will help you make a flexible meal plan that includes many foods that you like.  Watch serving sizes  Your meal plan will group foods by servings. To learn how much a serving is, start by measuring food portions at each meal. Soon youll know what a serving looks like on your plate. Ask your healthcare provider about how to balance servings of different foods.  Eat from all the food groups  The basis of a healthy meal plan is variety (eating lots of different foods). Choose lean meats, fresh fruits and vegetables, whole grains, and low-fat or nonfat dairy products. Eating a wide variety of foods provides the nutrients your body needs. It can also keep you from getting bored with your meal plan.  Learn about carbohydrates, fats, and protein  · Carbohydrates are starches, sugars, and fiber. They are found in many foods, including fruit, bread, pasta, milk, and sweets. Of all the foods you eat, carbohydrates have the most effect on your blood sugar. Your dietitian may teach you about carb counting, a way to figure out the number of carbohydrates in a meal.  · Fats have the most calories. They also have the most effect on your weight and your risk of heart disease. When you have diabetes, its important to control your weight and protect your heart. Foods that are high in fat include whole milk, cheese, snack foods, and desserts.  · Protein is important for building and repairing muscles and bones. Choose low-fat protein sources, such as fish, egg whites, and skinless chicken.  Reduce liquid sugars  Extra calories from sodas, sports drinks, and fruit drinks make it hard to keep blood sugar in range. Cut as many liquid sugars from your meal plan as you can.  This includes most fruit juices, which are often high in natural or added sugar. Instead, drink plenty of water and other sugar-free beverages.  Eat less fat  If you  need to lose weight, try to reduce the amount of fat in your diet. This can also help lower your cholesterol level to keep blood vessels healthier. Cut fat by using only small amounts of liquid oil for cooking. Read food labels carefully to avoid foods with unhealthy trans fats.  Timing your meals  When it comes to blood sugar control, when you eat is as important as what you eat. You may need to eat several small meals spaced evenly throughout the day to stay in your target range. So dont skip breakfast or wait until late in the day to get most of your calories. Doing so can cause your blood sugar to rise too high or fall too low.   Date Last Reviewed: 3/1/2016  © 8969-6734 [employer]. [address] [place] [address], [place] [address]. All rights reserved. This information is not intended as a substitute for professional medical care. Always follow your healthcare professional's instructions.      Diabetes: Learning About Serving and Portion Sizes     A good rule of thumb: Devote half your plate to vegetables and green salad. Split the other half between protein and starchy carbohydrates. Fruit makes a good dessert.     Servings and portions. Whats the difference? These terms can be very confusing. But learning to measure serving sizes can help you figure out how many carbohydrates (carbs) and other foods you eat each day. They are also powerful tools for managing your weight.  Servings and portions  Many different words are used to describe amounts of food. If your health care provider uses a term youre not sure of, dont be afraid to ask. It helps to know the difference between servings and portions:  · A serving size is a fixed size. Food producers use this term to describe their products. For example, the label on a cereal box could say that 1 cup of dry cereal = 1 serving.  · A portion (also called a helping) is how much you eat or how much you put on your plate at a meal. For example, you might  eat 2 cups of cereal at breakfast.  Using serving information  The portion you choose to eat (such as 2 cups of cereal) may be more than one serving as listed on the food label (such as 1 cup of cereal). Thats why it helps to measure or weigh the food you eat. Because the food label values are based on servings, youll need to know how many servings you eat at one sitting.     Ounces: 2 to 3 ounces is about the size of your palm.       1 Cup: 1 cup (or a medium-sized piece) is about the size of your fist.       1/2 Cup: 1/2 cup is about the size of your cupped hand.      One tablespoon is about the size of your thumb.  One teaspoon is about the size of the tip of your thumb.  Keeping track of serving sizes  When youre planning for a snack or a meal, keep servings in mind. If you dont have measuring cups or a scale handy, there are ways to eyeball serving sizes, such as comparing your food to the size of your hand (see pictures above).  Managing portion sizes  If your weight is a concern, reducing your portions can help. You can eat more than one serving of a food at once. But to keep from eating too much at one meal, learn how to manage your portions. A portion is the amount of each type of food on your plate. See the plate diagram for an example of balanced portions.  Date Last Reviewed: 3/1/2016  © 3711-8273 [employer]. [address] [place] [address], [place] [address]. All rights reserved. This information is not intended as a substitute for professional medical care. Always follow your healthcare professional's instructions.      Diabetes: Shopping for and Preparing Meals    Having diabetes doesnt mean you have to shop in a special aisle or look for special foods. But you will need to make choices. By comparing items and reading food labels, you can find the healthiest foods for you and your family.  Comparing items  When you shop, compare items to find the best ones for your needs. Keep these facts  "in mind:  · No sugar added does not mean a product is sugar-free.  · "Sugar-free" means less than 1/2 gram (g) of sugar per serving.  · Fat free means less than 1/2 g of fat per serving. This does not necessarily mean the product is low in calories.  · Low fat means 3 g fat or less per serving. Reduced fat or less fat means 25% less fat than the regular version. Some of this fat may be saturated or trans fat. And calories per serving may be similar to the regular version.  Making small changes  Dont try to change all of your eating habits at once. Here are some ideas to start with:  · Try fat-free or low-fat cheese, milk, and yogurt. Also try leaner cuts of meat. This will help you cut down on saturated fat.  · Try whole-grain breads, brown rice, and whole-wheat pasta.  · Load up on fresh or frozen vegetables. If you buy canned, choose low-sodium vegetables.  · Avoid processed foods as much as possible. They tend to be low in fiber and high in trans fats and sodium.  · Try tofu, soymilk, or meat substitutes.?They can help you cut cholesterol and saturated fat out of your diet.  Learning to read food labels  To find healthy foods that help you control blood sugar, learn how to read food labels. Look for the Nutrition Facts label on packaged foods. It will tell you how much carbohydrate, sugar, fat, and fiber is in each serving. Then, you can decide whether or not the food fits into your meal plan.  Using the food label  So, once you have the food label, what do you do with it? The food label helps in many ways. Use it to:  · Compare items and decide which is the best for your health needs.  · Track the number of carbohydrates in your portions.  · Figure out how many servings of a food you can have and still stay within the number of carbohydrates for that meal.  Planning meals  For good blood sugar control, plan what and when youll eat. Start by creating a meal plan that includes all the food groups. " Then, time your meals to help keep your blood sugar level steady. You may need to adjust your plan for special situations.  Eat from all the food groups  The basis of a healthy meal plan is variety (eating many different types of foods). Look for lean meats, fresh fruits and vegetables, whole grains, and low-fat or non-fat dairy products. Eating a wide variety of foods provides the nutrients your body needs. It can also keep you from getting bored with your meal plan.  Reduce liquid sugars  Extra calories from sodas, sports drinks, and fruit drinks make it hard to keep blood sugar in range. Cut as many liquid sugars from your meal plan as you can. This includes most fruit juices, which are often high in natural or added sugar. Instead, drink plenty of water and other sugar-free beverages.  Eat less fat  If you need to lose some weight, try to reduce the amount of fat in your diet. This can also help lower your cholesterol level to keep blood vessels healthier. Cut fat by using only small amounts of liquid oil for cooking. Read food labels carefully to avoid foods with unhealthy trans fats.  Timing your meals  When it comes to blood sugar control, when you eat is as important as what you eat. You may need to eat several small meals spaced evenly throughout the day to stay in your target range. So dont skip breakfast or wait until late in the day to get most of your calories. Doing so can cause your blood sugar to rise too high or fall too low.  Cooking wisely  · Broil, steam, bake, or grill meats and vegetables, instead of frying.  · Instead of cream-based sauces or sugary glazes, flavor foods with vegetable purée, lemon or lime juice, or herb seasonings.  · Remove skin from chicken and turkey before serving.  · Look in cookbooks for easy, low-fat, low-sugar recipes. When making your usual recipes, cut sugar by 1/2 and fat by 1/3.  Date Last Reviewed: 8/1/2016  © 1894-2420 [employer]. [address] [place] [address]  [place] [address]. All rights reserved. This information is not intended as a substitute for professional medical care. Always follow your healthcare professional's instructions.      Diabetes: Living Your Life    Having diabetes may mean changes at work and in your social life. But these changes need not keep you from doing well at work and enjoying your free time.  Family and friends  Your family and friends may have questions about diabetes. They may have a hard time understanding why you need to make changes in your life. Urge them to learn about diabetes with you. Spend time with friends who support you in taking good care of yourself.  Special occasions  Parties and holidays often mean more or different food and drink. You can still enjoy these events:  · At parties, focus on enjoying music, dancing, or talking to friends.  · Bring a snack or dish that works well for you. The other guests might appreciate low-calorie versions of their favorite foods.   · Before the next holiday, learn how to fit traditional foods into your meal plan.  · Restorationist holidays may mean fasting or other changes in the way you eat. Talk to your healthcare provider, your dietitian, and your clergy about how you can observe holidays safely.   Work  Lunch meetings, shift changes, or business travel may affect diabetes management.  · Make diabetes a priority. If your work schedule changes often or you find it hard to manage your daily tasks, talk to your healthcare provider and your employer.  · You may need to make special arrangements to do your daily tasks, such as checking your blood sugar.  · As long as you can do your job safely, your employer cant discriminate against you because of your health.  Tell your healthcare provider if youre feeling helpless or hopeless, or are having trouble sleeping or eating. These are symptoms of depression, a serious but treatable problem.   Date Last Reviewed: 5/1/2016  © 8106-3992 [employer]. [address] [place] [address], [place] [address]. All rights reserved. This information is not intended as a substitute for professional medical care. Always follow your healthcare professional's instructions.

## 2018-09-01 NOTE — HPI
This is a 22 year old male with Type 1 DM who presents with a 1 day complaint of nausea, vomiting, and abdominal pain.  The patient denies unusual ingestions or sick contacts.  He denies non-compliance with medications and notes that his blood glucose is usually well controlled.  The patient admits diarrheal stools for about a month since he started taking Truvada per his PCP.  Upon arrival to the ED, the patient had a blood glucose of 668 and a lipase of 924.

## 2018-09-01 NOTE — PLAN OF CARE
Problem: Patient Care Overview  Goal: Plan of Care Review  Outcome: Ongoing (interventions implemented as appropriate)  Plan of care reviewed with patient.  VSS.  Fluids maintained.  Purposeful rounding completed, call bell within reach, no needs at this time.  Will continue to monitor.

## 2018-09-01 NOTE — PLAN OF CARE
LMSW met with patient at the bedside.     Patient is alert and oriented with no communication barriers.     Prior to admission patient was independent with ADLs. Patient denies the use of HH or DME.     Patients P[facility] is Dr. Millard [facility]. Patient choice pharmacy is [facility].         Patient denies a history of mental illness.     Patients family will transport him home at discharge or Uber.    No CM needs identified at this time.       09/01/18 0902   Discharge Assessment   Assessment Type Discharge Planning Assessment   Confirmed/corrected address and phone number on facesheet? Yes   Assessment information obtained from? Patient   Communicated expected length of stay with patient/caregiver no   Prior to hospitilization cognitive status: Alert/Oriented   Prior to hospitalization functional status: Independent   Current Functional Status: Independent   Lives With parent(s)   Able to Return to Prior Arrangements yes   Is patient able to care for self after discharge? Yes   Patient's perception of discharge disposition home or selfcare   Readmission Within The Last 30 Days no previous admission in last 30 days   Patient currently being followed by outpatient case management? No   Patient currently receives any other outside agency services? No   Equipment Currently Used at Home none   Do you have any problems affording any of your prescribed medications? No   Is the patient taking medications as prescribed? yes   Does the patient have transportation home? Yes   Transportation Available other (see comments)   Does the patient receive services at the [facility] Clinic? No   Discharge Plan A Home   Patient/Family In Agreement With Plan yes

## 2018-09-04 LAB — POCT GLUCOSE: >500 MG/DL (ref 70–110)

## 2019-11-01 PROBLEM — E10.10 DIABETIC KETOACIDOSIS WITHOUT COMA ASSOCIATED WITH TYPE 1 DIABETES MELLITUS: Status: ACTIVE | Noted: 2019-11-01

## 2019-11-03 PROBLEM — L03.317 CELLULITIS AND ABSCESS OF BUTTOCK: Status: ACTIVE | Noted: 2019-11-03

## 2019-11-03 PROBLEM — L02.31 CELLULITIS AND ABSCESS OF BUTTOCK: Status: ACTIVE | Noted: 2019-11-03

## 2019-11-03 PROBLEM — R79.89 ELEVATED PROCALCITONIN: Status: ACTIVE | Noted: 2019-11-03

## 2019-11-04 PROBLEM — R19.7 DIARRHEA: Status: ACTIVE | Noted: 2019-11-04

## 2019-11-04 PROBLEM — R50.9 FEVER: Status: ACTIVE | Noted: 2019-11-04

## 2020-04-13 ENCOUNTER — HOSPITAL ENCOUNTER (EMERGENCY)
Facility: OTHER | Age: 25
Discharge: HOME | End: 2020-04-13
Attending: EMERGENCY MEDICINE
Payer: MEDICAID

## 2020-04-13 VITALS
WEIGHT: 175 LBS | RESPIRATION RATE: 16 BRPM | SYSTOLIC BLOOD PRESSURE: 121 MMHG | HEART RATE: 110 BPM | TEMPERATURE: 100 F | DIASTOLIC BLOOD PRESSURE: 75 MMHG | OXYGEN SATURATION: 96 % | BODY MASS INDEX: 23.73 KG/M2

## 2020-04-13 DIAGNOSIS — U07.1 2019 NOVEL CORONAVIRUS DISEASE (COVID-19): Primary | ICD-10-CM

## 2020-04-13 LAB — SARS-COV-2 RDRP RESP QL NAA+PROBE: POSITIVE

## 2020-04-13 PROCEDURE — U0002 COVID-19 LAB TEST NON-CDC: HCPCS

## 2020-04-13 PROCEDURE — 25000003 PHARM REV CODE 250: Performed by: NURSE PRACTITIONER

## 2020-04-13 PROCEDURE — 99284 EMERGENCY DEPT VISIT MOD MDM: CPT | Mod: 25

## 2020-04-13 RX ORDER — ACETAMINOPHEN 500 MG
1000 TABLET ORAL
Status: COMPLETED | OUTPATIENT
Start: 2020-04-13 | End: 2020-04-13

## 2020-04-13 RX ORDER — BENZONATATE 100 MG/1
100 CAPSULE ORAL 3 TIMES DAILY PRN
Qty: 20 CAPSULE | Refills: 0 | Status: SHIPPED | OUTPATIENT
Start: 2020-04-13 | End: 2020-04-23

## 2020-04-13 RX ORDER — ALBUTEROL SULFATE 90 UG/1
1-2 AEROSOL, METERED RESPIRATORY (INHALATION) EVERY 6 HOURS PRN
Qty: 6.7 G | Refills: 0 | Status: SHIPPED | OUTPATIENT
Start: 2020-04-13 | End: 2022-12-05 | Stop reason: CLARIF

## 2020-04-13 RX ADMIN — ACETAMINOPHEN 1000 MG: 500 TABLET ORAL at 06:04

## 2020-04-13 NOTE — ED PROVIDER NOTES
Encounter Date: 4/13/2020       History     Chief Complaint   Patient presents with    Cough     x3 days    Fatigue     x 3 days    Headache     x 3 days     This is the urgent evaluation of a 24 year old MTF transwoman, who reports cough, congestion, subjective fever and HA x 3 days.  She reports two contacts at her home tested positive for COVID-19.  She has not been taking any OTC meds.  PMH includes DM.        Review of patient's allergies indicates:   Allergen Reactions    Shrimp      Past Medical History:   Diagnosis Date    Diabetes mellitus      Past Surgical History:   Procedure Laterality Date    COLONOSCOPY       No family history on file.  Social History     Tobacco Use    Smoking status: Never Smoker    Smokeless tobacco: Never Used   Substance Use Topics    Alcohol use: Yes     Comment: occasionally    Drug use: Not Currently     Review of Systems   Constitutional: Positive for chills and fever.   HENT: Positive for congestion. Negative for ear discharge and sore throat.    Respiratory: Positive for cough. Negative for shortness of breath.    Cardiovascular: Negative for chest pain.   Gastrointestinal: Negative for abdominal pain.   Musculoskeletal: Positive for myalgias.   Neurological: Positive for headaches. Negative for dizziness, weakness and numbness.   All other systems reviewed and are negative.      Physical Exam     Initial Vitals [04/13/20 1758]   BP Pulse Resp Temp SpO2   121/75 110 16 100 °F (37.8 °C) 96 %      MAP       --         Physical Exam    Nursing note and vitals reviewed.  Constitutional: He appears well-developed and well-nourished. He does not appear ill. No distress.   HENT:   Head: Normocephalic and atraumatic.   Right Ear: Hearing, tympanic membrane and ear canal normal.   Left Ear: Hearing, tympanic membrane and ear canal normal.   Nose: Mucosal edema present. No rhinorrhea. Right sinus exhibits no maxillary sinus tenderness and no frontal sinus tenderness. Left  sinus exhibits no maxillary sinus tenderness and no frontal sinus tenderness.   Mouth/Throat: Uvula is midline, oropharynx is clear and moist and mucous membranes are normal. Mucous membranes are not pale and not dry. No posterior oropharyngeal erythema.   Neck: Neck supple.   Cardiovascular: Normal rate, regular rhythm and normal heart sounds.   Pulmonary/Chest: Effort normal. No tachypnea. He has decreased breath sounds. He has no wheezes.   Neurological: He is alert and oriented to person, place, and time. GCS eye subscore is 4. GCS verbal subscore is 5. GCS motor subscore is 6.   Skin: Skin is warm, dry and intact.         ED Course   Procedures  Labs Reviewed   SARS-COV-2 RNA AMPLIFICATION, QUAL          Imaging Results          X-Ray Chest AP Portable (Final result)  Result time 04/13/20 18:50:29    Final result by Gerhard Tolliver MD (04/13/20 18:50:29)                 Impression:      No acute cardiopulmonary process identified.      Electronically signed by: Gerhard Tolliver MD  Date:    04/13/2020  Time:    18:50             Narrative:    EXAMINATION:  XR CHEST AP PORTABLE    CLINICAL HISTORY:  Suspected Covid-19 Virus Infection;    TECHNIQUE:  Single frontal view of the chest was performed.    COMPARISON:  November 2019.    FINDINGS:  Cardiac silhouette is normal in size.  Lungs are hypoinflated.  No evidence of focal consolidative process, pneumothorax, or significant effusion.  No acute osseous abnormality identified.                                 Medical Decision Making:   Differential Diagnosis:   Differential Diagnosis includes, but is not limited to:  meningitis, nasal foreign body, otitis media/external, bacterial sinusitis, allergic rhinitis, influenza, bacterial/viral pharyngitis, bacterial/viral pneumonia, covid-19    Clinical Tests:   Lab Tests: Ordered and Reviewed  Radiological Study: Ordered and Reviewed  ED Management:  Pratik Torres was seen for a viral-like illness, therefore due to  symptoms it is the most recent recommendations from our hospital administrations/infectious disease at this time and known exposure, the patient will be swabbed for COVID 19.  She resulted positive.  Discussed with the patient the need to self quarantine at home until they have no fever x 3 days.  Reinforced this advice and the dangers failing to comply presents to the public.  Symptomatic treatment in the interim.  Work note for two weeks was provided. Return precautions including worsening fever that is uncontrollable, shortness of breath or chest pain were discussed.  Vital signs did not indicate sepsis and patient was welling appear, okay for discharge home for quarantine.                                     Clinical Impression:       ICD-10-CM ICD-9-CM   1. 2019 novel coronavirus disease (COVID-19) U07.1          Disposition:   Disposition: Discharged  Condition: Stable                        JANINE Pena  04/13/20 1914

## 2020-04-14 DIAGNOSIS — U07.1 COVID-19 VIRUS DETECTED: ICD-10-CM

## 2020-04-14 NOTE — DISCHARGE INSTRUCTIONS
You tested positive for COVID-19.  Read the following and attached handout for more information.    I recommend good hand hygiene, social distancing (ideally staying at least 6 feet away from people), not attending public events until health authorities  otherwise. Because you are positive, though you're healthy enough to return home, we highly recommend 14 day isolation period. This means staying at home and significantly limiting outside contact. Wear a mask around others.     For fever, cough, or other viral respiratory symptoms, I recommend supportive care measures.  This includes staying well hydrated, getting plenty of sleep, taking Tylenol or ibuprofen for fevers or pain.    So far from what we know about coronavirus, the people who get very sick tend to do so around day 7-8 of the illness.  Any severe shortness of breath, fever you cannot control, or other symptoms that you believe constitute an emergency, please return to the hospital.    Our goal in the emergency department is to always give you outstanding care and exceptional service. You may receive a survey by mail or e-mail in the next week regarding your experience in our ED. We would greatly appreciate your completing and returning the survey. Your feedback provides us with a way to recognize our staff who give very good care and it helps us learn how to improve when your experience was below our aspiration of excellence.

## 2020-04-14 NOTE — ED NOTES
"When I went in to tell the pt they were positive for coronavirus they were on the phone with their friend.  Pt has continously been on the phone during the visit.  After stating the test results the pt's friend started yelling "YOU got the coronavirus".  Pt told me to be quiet and shushed me.  I told them to not shush me when I am providing DC instructions, and if he didn't want people to hear then they should get off the phone when medical professionals enter the room to speak to them. Pt continued to escalate and yell at me.  Didn't want to hear about DC meds and walked out.  Pt left w/ DC instructions.  No vital signs obtained due to escalation of pt's behavior.  "

## 2020-06-07 ENCOUNTER — HOSPITAL ENCOUNTER (INPATIENT)
Facility: HOSPITAL | Age: 25
LOS: 7 days | Discharge: HOME | DRG: 333 | End: 2020-06-14
Attending: EMERGENCY MEDICINE | Admitting: INTERNAL MEDICINE
Payer: MEDICAID

## 2020-06-07 DIAGNOSIS — L02.31 GLUTEAL ABSCESS: Primary | ICD-10-CM

## 2020-06-07 LAB
ALBUMIN SERPL BCP-MCNC: 2.4 G/DL (ref 3.5–5.2)
ALP SERPL-CCNC: 104 U/L (ref 55–135)
ALT SERPL W/O P-5'-P-CCNC: 6 U/L (ref 10–44)
ANION GAP SERPL CALC-SCNC: 10 MMOL/L (ref 8–16)
ANISOCYTOSIS BLD QL SMEAR: SLIGHT
AST SERPL-CCNC: 17 U/L (ref 10–40)
BASOPHILS # BLD AUTO: ABNORMAL K/UL (ref 0–0.2)
BASOPHILS NFR BLD: 0 % (ref 0–1.9)
BILIRUB SERPL-MCNC: 0.3 MG/DL (ref 0.1–1)
BUN SERPL-MCNC: 11 MG/DL (ref 6–20)
CALCIUM SERPL-MCNC: 9.4 MG/DL (ref 8.7–10.5)
CHLORIDE SERPL-SCNC: 97 MMOL/L (ref 95–110)
CO2 SERPL-SCNC: 24 MMOL/L (ref 23–29)
CREAT SERPL-MCNC: 1.4 MG/DL (ref 0.5–1.4)
DIFFERENTIAL METHOD: ABNORMAL
EOSINOPHIL # BLD AUTO: ABNORMAL K/UL (ref 0–0.5)
EOSINOPHIL NFR BLD: 2 % (ref 0–8)
ERYTHROCYTE [DISTWIDTH] IN BLOOD BY AUTOMATED COUNT: 14.6 % (ref 11.5–14.5)
EST. GFR  (AFRICAN AMERICAN): >60 ML/MIN/1.73 M^2
EST. GFR  (NON AFRICAN AMERICAN): >60 ML/MIN/1.73 M^2
GLUCOSE SERPL-MCNC: 317 MG/DL (ref 70–110)
HCT VFR BLD AUTO: 26.8 % (ref 40–54)
HGB BLD-MCNC: 9 G/DL (ref 14–18)
HYPOCHROMIA BLD QL SMEAR: ABNORMAL
IMM GRANULOCYTES # BLD AUTO: ABNORMAL K/UL (ref 0–0.04)
IMM GRANULOCYTES NFR BLD AUTO: ABNORMAL % (ref 0–0.5)
LACTATE SERPL-SCNC: 1.7 MMOL/L (ref 0.5–2.2)
LYMPHOCYTES # BLD AUTO: ABNORMAL K/UL (ref 1–4.8)
LYMPHOCYTES NFR BLD: 27 % (ref 18–48)
MCH RBC QN AUTO: 26.9 PG (ref 27–31)
MCHC RBC AUTO-ENTMCNC: 33.6 G/DL (ref 32–36)
MCV RBC AUTO: 80 FL (ref 82–98)
MONOCYTES # BLD AUTO: ABNORMAL K/UL (ref 0.3–1)
MONOCYTES NFR BLD: 9 % (ref 4–15)
NEUTROPHILS NFR BLD: 62 % (ref 38–73)
NRBC BLD-RTO: 0 /100 WBC
OVALOCYTES BLD QL SMEAR: ABNORMAL
PLATELET # BLD AUTO: 316 K/UL (ref 150–350)
PLATELET BLD QL SMEAR: ABNORMAL
PMV BLD AUTO: 10.5 FL (ref 9.2–12.9)
POLYCHROMASIA BLD QL SMEAR: ABNORMAL
POTASSIUM SERPL-SCNC: 4.1 MMOL/L (ref 3.5–5.1)
PROT SERPL-MCNC: 9.9 G/DL (ref 6–8.4)
RBC # BLD AUTO: 3.34 M/UL (ref 4.6–6.2)
SARS-COV-2 RDRP RESP QL NAA+PROBE: NEGATIVE
SODIUM SERPL-SCNC: 131 MMOL/L (ref 136–145)
WBC # BLD AUTO: 10.01 K/UL (ref 3.9–12.7)

## 2020-06-07 PROCEDURE — 25000003 PHARM REV CODE 250: Performed by: EMERGENCY MEDICINE

## 2020-06-07 PROCEDURE — U0002 COVID-19 LAB TEST NON-CDC: HCPCS

## 2020-06-07 PROCEDURE — 36415 COLL VENOUS BLD VENIPUNCTURE: CPT

## 2020-06-07 PROCEDURE — 96368 THER/DIAG CONCURRENT INF: CPT

## 2020-06-07 PROCEDURE — 63600175 PHARM REV CODE 636 W HCPCS: Performed by: EMERGENCY MEDICINE

## 2020-06-07 PROCEDURE — 99285 EMERGENCY DEPT VISIT HI MDM: CPT | Mod: 25

## 2020-06-07 PROCEDURE — 87040 BLOOD CULTURE FOR BACTERIA: CPT | Mod: 59

## 2020-06-07 PROCEDURE — 85027 COMPLETE CBC AUTOMATED: CPT

## 2020-06-07 PROCEDURE — 96365 THER/PROPH/DIAG IV INF INIT: CPT

## 2020-06-07 PROCEDURE — 12000002 HC ACUTE/MED SURGE SEMI-PRIVATE ROOM

## 2020-06-07 PROCEDURE — 85007 BL SMEAR W/DIFF WBC COUNT: CPT

## 2020-06-07 PROCEDURE — 96366 THER/PROPH/DIAG IV INF ADDON: CPT

## 2020-06-07 PROCEDURE — 80053 COMPREHEN METABOLIC PANEL: CPT

## 2020-06-07 PROCEDURE — 83605 ASSAY OF LACTIC ACID: CPT

## 2020-06-07 RX ORDER — CEFEPIME HYDROCHLORIDE 1 G/50ML
1 INJECTION, SOLUTION INTRAVENOUS
Status: COMPLETED | OUTPATIENT
Start: 2020-06-07 | End: 2020-06-07

## 2020-06-07 RX ORDER — MORPHINE SULFATE 4 MG/ML
4 INJECTION, SOLUTION INTRAMUSCULAR; INTRAVENOUS EVERY 4 HOURS PRN
Status: DISCONTINUED | OUTPATIENT
Start: 2020-06-07 | End: 2020-06-10

## 2020-06-07 RX ORDER — ACETAMINOPHEN 500 MG
1000 TABLET ORAL
Status: COMPLETED | OUTPATIENT
Start: 2020-06-07 | End: 2020-06-07

## 2020-06-07 RX ORDER — VANCOMYCIN HCL IN 5 % DEXTROSE 1.5G/250ML
1500 PLASTIC BAG, INJECTION (ML) INTRAVENOUS
Status: COMPLETED | OUTPATIENT
Start: 2020-06-07 | End: 2020-06-08

## 2020-06-07 RX ADMIN — CEFEPIME HYDROCHLORIDE 1 G: 1 INJECTION, SOLUTION INTRAVENOUS at 09:06

## 2020-06-07 RX ADMIN — ACETAMINOPHEN 1000 MG: 500 TABLET ORAL at 08:06

## 2020-06-07 RX ADMIN — DEXTROSE MONOHYDRATE 1500 MG: 50 INJECTION, SOLUTION INTRAVENOUS at 10:06

## 2020-06-08 PROBLEM — D64.9 ANEMIA: Status: ACTIVE | Noted: 2020-06-08

## 2020-06-08 PROBLEM — L02.31 GLUTEAL ABSCESS: Status: ACTIVE | Noted: 2020-06-08

## 2020-06-08 PROBLEM — U07.1 COVID-19 VIRUS DETECTED: Status: ACTIVE | Noted: 2020-06-08

## 2020-06-08 LAB
ALBUMIN SERPL BCP-MCNC: 2.3 G/DL (ref 3.5–5.2)
ALBUMIN SERPL BCP-MCNC: 2.3 G/DL (ref 3.5–5.2)
ALBUMIN SERPL BCP-MCNC: 2.4 G/DL (ref 3.5–5.2)
ALP SERPL-CCNC: 101 U/L (ref 55–135)
ALP SERPL-CCNC: 101 U/L (ref 55–135)
ALP SERPL-CCNC: 108 U/L (ref 55–135)
ALT SERPL W/O P-5'-P-CCNC: 6 U/L (ref 10–44)
ALT SERPL W/O P-5'-P-CCNC: 6 U/L (ref 10–44)
ALT SERPL W/O P-5'-P-CCNC: 8 U/L (ref 10–44)
ANION GAP SERPL CALC-SCNC: 6 MMOL/L (ref 8–16)
ANION GAP SERPL CALC-SCNC: 6 MMOL/L (ref 8–16)
ANION GAP SERPL CALC-SCNC: 7 MMOL/L (ref 8–16)
AST SERPL-CCNC: 10 U/L (ref 10–40)
AST SERPL-CCNC: 10 U/L (ref 10–40)
AST SERPL-CCNC: 14 U/L (ref 10–40)
B-OH-BUTYR BLD STRIP-SCNC: 0.2 MMOL/L (ref 0–0.5)
BASOPHILS # BLD AUTO: 0.04 K/UL (ref 0–0.2)
BASOPHILS # BLD AUTO: 0.04 K/UL (ref 0–0.2)
BASOPHILS NFR BLD: 0.5 % (ref 0–1.9)
BASOPHILS NFR BLD: 0.5 % (ref 0–1.9)
BILIRUB SERPL-MCNC: 0.3 MG/DL (ref 0.1–1)
BNP SERPL-MCNC: 14 PG/ML (ref 0–99)
BUN SERPL-MCNC: 9 MG/DL (ref 6–20)
CALCIUM SERPL-MCNC: 9.3 MG/DL (ref 8.7–10.5)
CALCIUM SERPL-MCNC: 9.3 MG/DL (ref 8.7–10.5)
CALCIUM SERPL-MCNC: 9.7 MG/DL (ref 8.7–10.5)
CHLORIDE SERPL-SCNC: 100 MMOL/L (ref 95–110)
CHLORIDE SERPL-SCNC: 101 MMOL/L (ref 95–110)
CHLORIDE SERPL-SCNC: 101 MMOL/L (ref 95–110)
CK SERPL-CCNC: 47 U/L (ref 20–200)
CO2 SERPL-SCNC: 25 MMOL/L (ref 23–29)
CO2 SERPL-SCNC: 26 MMOL/L (ref 23–29)
CO2 SERPL-SCNC: 26 MMOL/L (ref 23–29)
CREAT SERPL-MCNC: 1 MG/DL (ref 0.5–1.4)
CREAT SERPL-MCNC: 1 MG/DL (ref 0.5–1.4)
CREAT SERPL-MCNC: 1.1 MG/DL (ref 0.5–1.4)
CRP SERPL-MCNC: 94.3 MG/L (ref 0–8.2)
D DIMER PPP IA.FEU-MCNC: 3.76 MG/L FEU
DIFFERENTIAL METHOD: ABNORMAL
DIFFERENTIAL METHOD: ABNORMAL
EOSINOPHIL # BLD AUTO: 0.1 K/UL (ref 0–0.5)
EOSINOPHIL # BLD AUTO: 0.1 K/UL (ref 0–0.5)
EOSINOPHIL NFR BLD: 1.6 % (ref 0–8)
EOSINOPHIL NFR BLD: 1.6 % (ref 0–8)
ERYTHROCYTE [DISTWIDTH] IN BLOOD BY AUTOMATED COUNT: 14.6 % (ref 11.5–14.5)
ERYTHROCYTE [DISTWIDTH] IN BLOOD BY AUTOMATED COUNT: 14.6 % (ref 11.5–14.5)
ERYTHROCYTE [SEDIMENTATION RATE] IN BLOOD BY WESTERGREN METHOD: >120 MM/HR (ref 0–23)
EST. GFR  (AFRICAN AMERICAN): >60 ML/MIN/1.73 M^2
EST. GFR  (NON AFRICAN AMERICAN): >60 ML/MIN/1.73 M^2
FERRITIN SERPL-MCNC: 396 NG/ML (ref 20–300)
GLUCOSE SERPL-MCNC: 240 MG/DL (ref 70–110)
GLUCOSE SERPL-MCNC: 240 MG/DL (ref 70–110)
GLUCOSE SERPL-MCNC: 257 MG/DL (ref 70–110)
HCT VFR BLD AUTO: 27.7 % (ref 40–54)
HCT VFR BLD AUTO: 27.7 % (ref 40–54)
HGB BLD-MCNC: 9 G/DL (ref 14–18)
HGB BLD-MCNC: 9 G/DL (ref 14–18)
IMM GRANULOCYTES # BLD AUTO: 0.09 K/UL (ref 0–0.04)
IMM GRANULOCYTES # BLD AUTO: 0.09 K/UL (ref 0–0.04)
IMM GRANULOCYTES NFR BLD AUTO: 1 % (ref 0–0.5)
IMM GRANULOCYTES NFR BLD AUTO: 1 % (ref 0–0.5)
LDH SERPL L TO P-CCNC: 238 U/L (ref 110–260)
LYMPHOCYTES # BLD AUTO: 2.8 K/UL (ref 1–4.8)
LYMPHOCYTES # BLD AUTO: 2.8 K/UL (ref 1–4.8)
LYMPHOCYTES NFR BLD: 32.6 % (ref 18–48)
LYMPHOCYTES NFR BLD: 32.6 % (ref 18–48)
MAGNESIUM SERPL-MCNC: 1.7 MG/DL (ref 1.6–2.6)
MCH RBC QN AUTO: 27.4 PG (ref 27–31)
MCH RBC QN AUTO: 27.4 PG (ref 27–31)
MCHC RBC AUTO-ENTMCNC: 32.5 G/DL (ref 32–36)
MCHC RBC AUTO-ENTMCNC: 32.5 G/DL (ref 32–36)
MCV RBC AUTO: 84 FL (ref 82–98)
MCV RBC AUTO: 84 FL (ref 82–98)
MONOCYTES # BLD AUTO: 1 K/UL (ref 0.3–1)
MONOCYTES # BLD AUTO: 1 K/UL (ref 0.3–1)
MONOCYTES NFR BLD: 11.8 % (ref 4–15)
MONOCYTES NFR BLD: 11.8 % (ref 4–15)
NEUTROPHILS # BLD AUTO: 4.5 K/UL (ref 1.8–7.7)
NEUTROPHILS # BLD AUTO: 4.5 K/UL (ref 1.8–7.7)
NEUTROPHILS NFR BLD: 52.5 % (ref 38–73)
NEUTROPHILS NFR BLD: 52.5 % (ref 38–73)
NRBC BLD-RTO: 0 /100 WBC
NRBC BLD-RTO: 0 /100 WBC
PHOSPHATE SERPL-MCNC: 3.8 MG/DL (ref 2.7–4.5)
PHOSPHATE SERPL-MCNC: 4.2 MG/DL (ref 2.7–4.5)
PHOSPHATE SERPL-MCNC: 4.2 MG/DL (ref 2.7–4.5)
PLATELET # BLD AUTO: 360 K/UL (ref 150–350)
PLATELET # BLD AUTO: 360 K/UL (ref 150–350)
PMV BLD AUTO: 10.6 FL (ref 9.2–12.9)
PMV BLD AUTO: 10.6 FL (ref 9.2–12.9)
POCT GLUCOSE: 228 MG/DL (ref 70–110)
POCT GLUCOSE: 247 MG/DL (ref 70–110)
POCT GLUCOSE: 406 MG/DL (ref 70–110)
POTASSIUM SERPL-SCNC: 4 MMOL/L (ref 3.5–5.1)
PROCALCITONIN SERPL IA-MCNC: 0.19 NG/ML
PROT SERPL-MCNC: 10 G/DL (ref 6–8.4)
PROT SERPL-MCNC: 10 G/DL (ref 6–8.4)
PROT SERPL-MCNC: 10.3 G/DL (ref 6–8.4)
RBC # BLD AUTO: 3.29 M/UL (ref 4.6–6.2)
RBC # BLD AUTO: 3.29 M/UL (ref 4.6–6.2)
SODIUM SERPL-SCNC: 132 MMOL/L (ref 136–145)
SODIUM SERPL-SCNC: 133 MMOL/L (ref 136–145)
SODIUM SERPL-SCNC: 133 MMOL/L (ref 136–145)
TROPONIN I SERPL DL<=0.01 NG/ML-MCNC: <0.006 NG/ML (ref 0–0.03)
WBC # BLD AUTO: 8.61 K/UL (ref 3.9–12.7)
WBC # BLD AUTO: 8.61 K/UL (ref 3.9–12.7)

## 2020-06-08 PROCEDURE — 83735 ASSAY OF MAGNESIUM: CPT

## 2020-06-08 PROCEDURE — 85379 FIBRIN DEGRADATION QUANT: CPT

## 2020-06-08 PROCEDURE — 63600175 PHARM REV CODE 636 W HCPCS: Performed by: EMERGENCY MEDICINE

## 2020-06-08 PROCEDURE — S0030 INJECTION, METRONIDAZOLE: HCPCS | Performed by: STUDENT IN AN ORGANIZED HEALTH CARE EDUCATION/TRAINING PROGRAM

## 2020-06-08 PROCEDURE — 84100 ASSAY OF PHOSPHORUS: CPT

## 2020-06-08 PROCEDURE — 63600175 PHARM REV CODE 636 W HCPCS: Performed by: INTERNAL MEDICINE

## 2020-06-08 PROCEDURE — 85025 COMPLETE CBC W/AUTO DIFF WBC: CPT

## 2020-06-08 PROCEDURE — 63600175 PHARM REV CODE 636 W HCPCS: Performed by: STUDENT IN AN ORGANIZED HEALTH CARE EDUCATION/TRAINING PROGRAM

## 2020-06-08 PROCEDURE — 84484 ASSAY OF TROPONIN QUANT: CPT

## 2020-06-08 PROCEDURE — 84100 ASSAY OF PHOSPHORUS: CPT | Mod: 91

## 2020-06-08 PROCEDURE — 80053 COMPREHEN METABOLIC PANEL: CPT

## 2020-06-08 PROCEDURE — 83615 LACTATE (LD) (LDH) ENZYME: CPT

## 2020-06-08 PROCEDURE — 82728 ASSAY OF FERRITIN: CPT

## 2020-06-08 PROCEDURE — 80053 COMPREHEN METABOLIC PANEL: CPT | Mod: 91

## 2020-06-08 PROCEDURE — 83735 ASSAY OF MAGNESIUM: CPT | Mod: 91

## 2020-06-08 PROCEDURE — 87186 SC STD MICRODIL/AGAR DIL: CPT

## 2020-06-08 PROCEDURE — 25000003 PHARM REV CODE 250: Performed by: STUDENT IN AN ORGANIZED HEALTH CARE EDUCATION/TRAINING PROGRAM

## 2020-06-08 PROCEDURE — 87077 CULTURE AEROBIC IDENTIFY: CPT | Mod: 59

## 2020-06-08 PROCEDURE — 87070 CULTURE OTHR SPECIMN AEROBIC: CPT

## 2020-06-08 PROCEDURE — 11000001 HC ACUTE MED/SURG PRIVATE ROOM

## 2020-06-08 PROCEDURE — 82550 ASSAY OF CK (CPK): CPT

## 2020-06-08 PROCEDURE — 36415 COLL VENOUS BLD VENIPUNCTURE: CPT

## 2020-06-08 PROCEDURE — 82010 KETONE BODYS QUAN: CPT

## 2020-06-08 PROCEDURE — 84145 PROCALCITONIN (PCT): CPT

## 2020-06-08 PROCEDURE — 83880 ASSAY OF NATRIURETIC PEPTIDE: CPT

## 2020-06-08 PROCEDURE — 85652 RBC SED RATE AUTOMATED: CPT

## 2020-06-08 PROCEDURE — 25500020 PHARM REV CODE 255: Performed by: INTERNAL MEDICINE

## 2020-06-08 PROCEDURE — 86140 C-REACTIVE PROTEIN: CPT

## 2020-06-08 PROCEDURE — C9399 UNCLASSIFIED DRUGS OR BIOLOG: HCPCS | Performed by: STUDENT IN AN ORGANIZED HEALTH CARE EDUCATION/TRAINING PROGRAM

## 2020-06-08 RX ORDER — CEFTRIAXONE 1 G/1
1 INJECTION, POWDER, FOR SOLUTION INTRAMUSCULAR; INTRAVENOUS
Status: DISCONTINUED | OUTPATIENT
Start: 2020-06-09 | End: 2020-06-09

## 2020-06-08 RX ORDER — ACETAMINOPHEN 325 MG/1
650 TABLET ORAL EVERY 6 HOURS PRN
Status: DISCONTINUED | OUTPATIENT
Start: 2020-06-08 | End: 2020-06-14 | Stop reason: HOSPADM

## 2020-06-08 RX ORDER — SODIUM CHLORIDE 0.9 % (FLUSH) 0.9 %
2 SYRINGE (ML) INJECTION EVERY 6 HOURS PRN
Status: DISCONTINUED | OUTPATIENT
Start: 2020-06-08 | End: 2020-06-14 | Stop reason: HOSPADM

## 2020-06-08 RX ORDER — ESTRADIOL 1 MG/1
1 TABLET ORAL DAILY
COMMUNITY

## 2020-06-08 RX ORDER — METRONIDAZOLE 500 MG/100ML
500 INJECTION, SOLUTION INTRAVENOUS
Status: DISCONTINUED | OUTPATIENT
Start: 2020-06-08 | End: 2020-06-10

## 2020-06-08 RX ORDER — ENOXAPARIN SODIUM 100 MG/ML
40 INJECTION SUBCUTANEOUS EVERY 24 HOURS
Status: DISCONTINUED | OUTPATIENT
Start: 2020-06-08 | End: 2020-06-09

## 2020-06-08 RX ORDER — CEFEPIME HYDROCHLORIDE 1 G/1
1 INJECTION, POWDER, FOR SOLUTION INTRAMUSCULAR; INTRAVENOUS
Status: DISCONTINUED | OUTPATIENT
Start: 2020-06-08 | End: 2020-06-08

## 2020-06-08 RX ORDER — INSULIN ASPART 100 [IU]/ML
10 INJECTION, SOLUTION INTRAVENOUS; SUBCUTANEOUS
COMMUNITY

## 2020-06-08 RX ORDER — GLUCAGON 1 MG
1 KIT INJECTION
Status: DISCONTINUED | OUTPATIENT
Start: 2020-06-08 | End: 2020-06-14 | Stop reason: HOSPADM

## 2020-06-08 RX ORDER — CEFEPIME HYDROCHLORIDE 2 G/1
2 INJECTION, POWDER, FOR SOLUTION INTRAVENOUS
Status: DISCONTINUED | OUTPATIENT
Start: 2020-06-08 | End: 2020-06-08

## 2020-06-08 RX ORDER — INSULIN ASPART 100 [IU]/ML
0-5 INJECTION, SOLUTION INTRAVENOUS; SUBCUTANEOUS EVERY 6 HOURS PRN
Status: DISCONTINUED | OUTPATIENT
Start: 2020-06-08 | End: 2020-06-10

## 2020-06-08 RX ORDER — ESTRADIOL 1 MG/1
1 TABLET ORAL DAILY
Status: DISCONTINUED | OUTPATIENT
Start: 2020-06-09 | End: 2020-06-14 | Stop reason: HOSPADM

## 2020-06-08 RX ORDER — LOPERAMIDE HYDROCHLORIDE 2 MG/1
2 CAPSULE ORAL 2 TIMES DAILY
Status: ON HOLD | COMMUNITY
End: 2020-06-14 | Stop reason: SDUPTHER

## 2020-06-08 RX ORDER — INSULIN ASPART 100 [IU]/ML
5 INJECTION, SOLUTION INTRAVENOUS; SUBCUTANEOUS
Status: DISCONTINUED | OUTPATIENT
Start: 2020-06-09 | End: 2020-06-10

## 2020-06-08 RX ORDER — SPIRONOLACTONE 25 MG/1
25 TABLET ORAL DAILY
COMMUNITY

## 2020-06-08 RX ORDER — IBUPROFEN 200 MG
24 TABLET ORAL
Status: DISCONTINUED | OUTPATIENT
Start: 2020-06-08 | End: 2020-06-14 | Stop reason: HOSPADM

## 2020-06-08 RX ORDER — LIDOCAINE HYDROCHLORIDE 10 MG/ML
20 INJECTION INFILTRATION; PERINEURAL ONCE
Status: DISCONTINUED | OUTPATIENT
Start: 2020-06-08 | End: 2020-06-14 | Stop reason: HOSPADM

## 2020-06-08 RX ORDER — IBUPROFEN 200 MG
16 TABLET ORAL
Status: DISCONTINUED | OUTPATIENT
Start: 2020-06-08 | End: 2020-06-14 | Stop reason: HOSPADM

## 2020-06-08 RX ORDER — SPIRONOLACTONE 25 MG/1
25 TABLET ORAL DAILY
Status: DISCONTINUED | OUTPATIENT
Start: 2020-06-09 | End: 2020-06-11

## 2020-06-08 RX ADMIN — INSULIN ASPART 2 UNITS: 100 INJECTION, SOLUTION INTRAVENOUS; SUBCUTANEOUS at 04:06

## 2020-06-08 RX ADMIN — METRONIDAZOLE 500 MG: 500 INJECTION, SOLUTION INTRAVENOUS at 09:06

## 2020-06-08 RX ADMIN — CEFEPIME 2 G: 2 INJECTION, POWDER, FOR SOLUTION INTRAVENOUS at 11:06

## 2020-06-08 RX ADMIN — MORPHINE SULFATE 4 MG: 4 INJECTION INTRAVENOUS at 12:06

## 2020-06-08 RX ADMIN — INSULIN ASPART 3 UNITS: 100 INJECTION, SOLUTION INTRAVENOUS; SUBCUTANEOUS at 10:06

## 2020-06-08 RX ADMIN — INSULIN ASPART 2 UNITS: 100 INJECTION, SOLUTION INTRAVENOUS; SUBCUTANEOUS at 09:06

## 2020-06-08 RX ADMIN — INSULIN DETEMIR 20 UNITS: 100 INJECTION, SOLUTION SUBCUTANEOUS at 10:06

## 2020-06-08 RX ADMIN — IOHEXOL 75 ML: 350 INJECTION, SOLUTION INTRAVENOUS at 10:06

## 2020-06-08 RX ADMIN — Medication 1250 MG: at 05:06

## 2020-06-08 NOTE — SUBJECTIVE & OBJECTIVE
No current facility-administered medications on file prior to encounter.      Current Outpatient Medications on File Prior to Encounter   Medication Sig    albuterol (PROVENTIL/VENTOLIN HFA) 90 mcg/actuation inhaler Inhale 1-2 puffs into the lungs every 6 (six) hours as needed for Wheezing. Rescue    estradioL (ESTRACE) 1 MG tablet Take 1 mg by mouth once daily.    insulin aspart U-100 (NOVOLOG) 100 unit/mL injection Inject 10 Units into the skin 3 (three) times daily before meals.    insulin glargine (LANTUS SOLOSTAR) 100 unit/mL (3 mL) InPn pen Inject 15 Units into the skin every evening. (Patient taking differently: Inject 40 Units into the skin every evening. )    loperamide (IMODIUM) 2 mg capsule Take 2 mg by mouth 2 (two) times a day.    spironolactone (ALDACTONE) 25 MG tablet Take 25 mg by mouth once daily.    [DISCONTINUED] insulin lispro (HUMALOG) 100 unit/mL injection Inject into the skin 3 (three) times daily before meals.       Review of patient's allergies indicates:   Allergen Reactions    Shrimp        Past Medical History:   Diagnosis Date    Diabetes mellitus      Past Surgical History:   Procedure Laterality Date    COLONOSCOPY      TOE SURGERY       Family History     None        Tobacco Use    Smoking status: Never Smoker    Smokeless tobacco: Never Used   Substance and Sexual Activity    Alcohol use: Yes     Comment: occasionally    Drug use: Not Currently    Sexual activity: Not on file     Review of Systems   Constitutional: Positive for fatigue and fever.   HENT: Negative.    Eyes: Negative.    Respiratory: Negative.  Negative for shortness of breath.    Cardiovascular: Negative.  Negative for chest pain.   Gastrointestinal: Positive for abdominal pain and diarrhea. Negative for blood in stool, constipation, nausea, rectal pain and vomiting.   Endocrine: Negative.    Genitourinary: Negative.    Musculoskeletal: Negative.    Skin: Negative.    Allergic/Immunologic: Negative.     Neurological: Negative.    Hematological: Negative.    Psychiatric/Behavioral: Negative.      Objective:     Vital Signs (Most Recent):  Temp: 98.1 °F (36.7 °C) (06/08/20 1506)  Pulse: 100 (06/08/20 1506)  Resp: 12 (06/08/20 1506)  BP: 118/66 (06/08/20 1506)  SpO2: 100 % (06/08/20 1506) Vital Signs (24h Range):  Temp:  [97.7 °F (36.5 °C)-98.6 °F (37 °C)] 98.1 °F (36.7 °C)  Pulse:  [] 100  Resp:  [12-18] 12  SpO2:  [100 %] 100 %  BP: (105-147)/(50-85) 118/66     Weight: 83.9 kg (185 lb)  Body mass index is 25.09 kg/m².    Physical Exam   Constitutional: He is oriented to person, place, and time. He appears well-developed and well-nourished. No distress.   Cardiovascular: Normal rate and regular rhythm.   Pulmonary/Chest: Effort normal. No respiratory distress.   Abdominal: Soft. He exhibits no distension.   Genitourinary:   Genitourinary Comments: Right gluteal abscess cavity with purulent drainage with necrotic tissue present; tracks down to the rectum  BRIGHT deferred due to patient discomfort with exam of abscess cavity and likely visit to the OR   Musculoskeletal: He exhibits no edema.   Neurological: He is alert and oriented to person, place, and time.   Skin: Skin is warm and dry. He is not diaphoretic.   Nursing note and vitals reviewed.      Significant Labs:  CBC:   Recent Labs   Lab 06/08/20  1311   WBC 8.61  8.61   RBC 3.29*  3.29*   HGB 9.0*  9.0*   HCT 27.7*  27.7*   *  360*   MCV 84  84   MCH 27.4  27.4   MCHC 32.5  32.5     CMP:   Recent Labs   Lab 06/08/20  1311   *  240*   CALCIUM 9.3  9.3   ALBUMIN 2.3*  2.3*   PROT 10.0*  10.0*   *  133*   K 4.0  4.0   CO2 26  26     101   BUN 9  9   CREATININE 1.0  1.0   ALKPHOS 101  101   ALT 6*  6*   AST 10  10   BILITOT 0.3  0.3       Significant Diagnostics:  I have reviewed all pertinent imaging results/findings within the past 24 hours.     CT shows celsa-rectal involvement

## 2020-06-08 NOTE — CONSULTS
"[facility]  Colorectal Surgery  Consult Note    Patient Name: Pratik Torres  MRN: [id number]  Admission Date: 6/7/2020  Hospital Length of Stay: 0 days  Attending Physician: Flower Saldivar MD  Primary Care Provider: Felipe Summers MD    Inpatient consult to Colorectal Surgery  Consult performed by: Jae Reyes MD  Consult ordered by: Price Yeung MD        Subjective:     Chief Complaint/Reason for Admission: perianal abscess    History of Present Illness:  24 year old MTF transwoman presents with gluteal/perianal abscess. Reports having "too many to count" perianal abscesses over the years that have required I&D. No history of fistula or seton placement. Most recently developed R perianal pain and presented to [facility] last week, where she underwent bedside I&D x2. Was discharged and presented here with persistent perianal pain and drainage from wound.     Patient also reports over the past year intractable diarrhea and difficulty controlling bowels. Has been worked up by outside GI including colonoscopy which patient reports has been negative for IBD. Takes loperamide but this does not help. Denies any history of feculent drainage from abscesses- only reports pus draining in past.     No new subjective & objective note has been filed under this hospital service since the last note was generated.    Assessment/Plan:     No new Assessment & Plan notes have been filed under this hospital service since the last note was generated.  Service: Colon and Rectal Surgery      Thank you for your consult. I will follow-up with patient. Please contact us if you have any additional questions.    Jae Reyes MD  Colorectal Surgery  [facility]    "

## 2020-06-08 NOTE — HPI
"24 year old MTF transwoman presents with gluteal/perianal abscess. Reports having "too many to count" perianal abscesses over the years that have required I&D. No history of fistula or seton placement. Most recently developed R perianal pain and presented to [facility] last week, where she underwent bedside I&D x2. Was discharged and presented here with persistent perianal pain and drainage from wound.     Patient also reports over the past year intractable diarrhea and difficulty controlling bowels. Has been worked up by outside GI including colonoscopy which patient reports has been negative for IBD. Takes loperamide but this does not help. Denies any history of feculent drainage from abscesses- only reports pus draining in past.   "

## 2020-06-08 NOTE — CONSULTS
Vancomycin Update (16:13 6/8):  · Evening labs with normalized creatinine - re-start original regimen ASAP    · See PharmD note from 8:48 am    Thank you for the consult,   Stella Ruiz       Patient brief summary:  Pratik Torres is a 24 y.o. male initiated on antimicrobial therapy with IV Vancomycin for treatment of gluteal abscess    Drug Allergies:   Review of patient's allergies indicates:   Allergen Reactions    Shrimp        Actual Body Weight:   83.9 KG    Renal Function:   Estimated Creatinine Clearance: 125 mL/min (based on SCr of 1 mg/dL).,     Dialysis Method (if applicable):  N/A    CBC (last 72 hours):  Recent Labs   Lab Result Units 06/07/20 2049 06/08/20  1311   WBC K/uL 10.01 8.61  8.61   Hemoglobin g/dL 9.0* 9.0*  9.0*   Hematocrit % 26.8* 27.7*  27.7*   Platelets K/uL 316 360*  360*   Gran% % 62.0 52.5  52.5   Lymph% % 27.0 32.6  32.6   Mono% % 9.0 11.8  11.8   Eosinophil% % 2.0 1.6  1.6   Basophil% % 0.0 0.5  0.5   Differential Method  Manual Automated  Automated       Metabolic Panel (last 72 hours):  Recent Labs   Lab Result Units 06/07/20 2049 06/08/20  0754 06/08/20  1311   Sodium mmol/L 131* 132* 133*  133*   Potassium mmol/L 4.1 4.0 4.0  4.0   Chloride mmol/L 97 100 101  101   CO2 mmol/L 24 25 26  26   Glucose mg/dL 317* 257* 240*  240*   BUN, Bld mg/dL 11 9 9  9   Creatinine mg/dL 1.4 1.1 1.0  1.0   Albumin g/dL 2.4* 2.4* 2.3*  2.3*   Total Bilirubin mg/dL 0.3 0.3 0.3  0.3   Alkaline Phosphatase U/L 104 108 101  101   AST U/L 17 14 10  10   ALT U/L 6* 8* 6*  6*   Magnesium mg/dL  --  1.7 1.7  1.7   Phosphorus mg/dL  --  3.8 4.2  4.2       Vancomycin Administrations:  vancomycin given in the last 96 hours                   vancomycin 1.5 g in 5 % dextrose 250 mL IVPB (mg) 1,500 mg New Bag 06/07/20 5228                Microbiologic Results:  Microbiology Results (last 7 days)     Procedure Component Value Units Date/Time    Blood Culture #2 **CANNOT BE ORDERED  STAT** [[id number]] Collected:  06/07/20 2129    Order Status:  Completed Specimen:  Blood Updated:  06/08/20 1545     Blood Culture, Routine No Growth to date    Blood Culture #1 **CANNOT BE ORDERED STAT** [[id number]] Collected:  06/07/20 2049    Order Status:  Completed Specimen:  Blood from Peripheral, Forearm, Left Updated:  06/08/20 1545     Blood Culture, Routine No Growth to date    Aerobic culture [[id number]] Collected:  06/08/20 1302    Order Status:  Sent Specimen:  Abscess Updated:  06/08/20 1343

## 2020-06-08 NOTE — PLAN OF CARE
"   06/08/20 1439   Discharge Assessment   Assessment Type Discharge Planning Assessment   Confirmed/corrected address and phone number on facesheet? Yes  (Pt states he is currently "Homeless")   Assessment information obtained from? Patient   Expected Length of Stay (days) 3   Prior to hospitilization cognitive status: Alert/Oriented   Prior to hospitalization functional status: Independent   Current cognitive status: Alert/Oriented   Current Functional Status: Independent   Lives With alone   Able to Return to Prior Arrangements other (see comments)  (Patient states "Homeless")   Is patient able to care for self after discharge? Unable to determine at this time (comments)   Readmission Within the Last 30 Days no previous admission in last 30 days   Patient currently receives any other outside agency services? No   Equipment Currently Used at Home none   Do you have any problems affording any of your prescribed medications? No   Is the patient taking medications as prescribed? yes   Does the patient have transportation home?   (TBD)   Transportation Anticipated other (see comments)  (TBD)   Does the patient receive services at the Coumadin Clinic? No   Discharge Plan A Shelter   DME Needed Upon Discharge  other (see comments)  (TBD)   Patient/Family in Agreement with Plan yes       [id number] [address] [place] [address]  [facility] [address]  Phone: [phone] Fax: [phone]    Payor: MEDICAID / Plan: [place] [employer] / Product Type: Managed Medicaid /      Felipe Summers MD   [address] [place] [address] [facility]     F/U appt Televisit scheduled for 6/17/20 @10:15                "

## 2020-06-08 NOTE — ED NOTES
Pt states she is transgender and transitioning to a female. She is just starting hormones and would like to be referred to as a female, using female pronouns.

## 2020-06-08 NOTE — HPI
"Odessa Torres is a 24-year-old MTF transgender woman with PMH DM I, IBS, previous gluteal abscesses who presents with subjective and objective fevers and gluteal abscess for several days. She reports the abscess on her right buttock is tender only to palpation. She has also been experiencing episodes of involuntary diarrhea, which can occur as many as ten times per day,  by days without bowel movements, though she reports this has been ongoing for the past three years since she was diagnosed with IBS. She denies chest pain, shortness of breath, nausea, vomiting, myalgias, cough.     In March of 2020, she had an abscess on her left inner buttock and left medial thigh. Cultures were positive for MRSA, e coli, and prevotella. She was discharged with a PICC line and completed course of vancomycin (3/2 - 3/27, was supposed to complete on 3/29 but discontinued 2 days early due to THEE), metronidazole 500 mg q8 (3/8 - 3/14), and ceftriaxone 2 g q24 (3/7 - 3/13). KEL in March of 2020 negative for vegetations.     She was diagnosed with COVID-19 in April of 2020, at which point she was also treated with ceftriaxone for five days for klebsiella UTI.     She was recently admitted to [facility] in [place], where she presented with gluteal abscess and fever (101.9 in ED), during which she was admitted to the ICU for DKA and sepsis presumed to be secondary to gluteal abscess. At that time, she received vancomycin, piperacillin-tazobactam, and clindamycin in the ED, and was later switched to vancomycin and ceftriaxone. Blood cultures drawn on 6/2 with no growth x5 days. Wound cultures not obtained. HIV and HCV negative on 6/2/2020. Patient was evaluated by surgical team, who recommended further debridement in the OR, however, there was concern regarding positive COVID status. Per patient, left AMA due to dissatisfaction with her hospital experience. Per patient, was repeatedly referred to as "Ms. " "Odessa," rather than "Odessa," felt like people were talking about her disrespectfully in the hallways outside of her room, and was not given food for a prolonged period of time while waiting to undergo surgery, which had to be rescheduled. She then represented to the ED at [facility] for continued fevers.     Odessa does not smoke cigarettes, she drinks alcohol only at parties at most several days out of the month, has consumed edible marijuana, has used black marva, denies cocaine, heroin, or any IV drug use. She lived at home with her family in [facility], though reports yesterday she became homeless as her family kicked her out. 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 She identifies as female (pronouns she, her), has oral and anal sex with men, uses condoms consistently, has a history of treated syphilis, and denies history of any other STIs, and reports no sexual activity in the past four months. She has received PrEP previously. She follows at [facility] for her estradiol and spironolactone. She takes glargine 40 units once daily and aspart 10 TID with meals. Denies allergies to medications, though shrimp makes her throat close up.     Upon admission, afebrile, HR 90, -147 systolic (one-time BP of 105/50), SpO2 100% RA. Hemoglobin stable at 9, creatinine 1, anion gap 6, beta-hydroxybutyrate 0.2, . Elevated inflammatory markers. COVID positive day prior.   "

## 2020-06-08 NOTE — CONSULTS
Vancomycin Update:    · Given the increase in serum creatinine to 1.4 from a baseline of 0.8, will stop scheduled Vancomycin for now s/p loading dose of 1.5 grams x 1 and check random in the am.   · Will follow-up with urine output    Vancomycin Regimen Plan:    Discontinue the scheduled vancomycin regimen and re-dose when the random level is less than 20 mcg/mL, next level to be drawn at 4 am on 6/9.      Pharmacy will continue to follow and monitor vancomycin.    Please contact pharmacy at extension [phone] for questions regarding this assessment.    Thank you for the consult,   Stella Ruiz       Patient brief summary:  Pratik Trores is a 24 y.o. male initiated on antimicrobial therapy with IV Vancomycin for treatment of empiric therapy/DKA/bacteremia?    Drug Allergies:   Review of patient's allergies indicates:   Allergen Reactions    Shrimp        Actual Body Weight:   83.9 KG    Renal Function:   Estimated Creatinine Clearance: 89.3 mL/min (based on SCr of 1.4 mg/dL).,     Dialysis Method (if applicable):  N/A    CBC (last 72 hours):  Recent Labs   Lab Result Units 06/07/20  2049   WBC K/uL 10.01   Hemoglobin g/dL 9.0*   Hematocrit % 26.8*   Platelets K/uL 316   Gran% % 62.0   Lymph% % 27.0   Mono% % 9.0   Eosinophil% % 2.0   Basophil% % 0.0   Differential Method  Manual       Metabolic Panel (last 72 hours):  Recent Labs   Lab Result Units 06/07/20  2049   Sodium mmol/L 131*   Potassium mmol/L 4.1   Chloride mmol/L 97   CO2 mmol/L 24   Glucose mg/dL 317*   BUN, Bld mg/dL 11   Creatinine mg/dL 1.4   Albumin g/dL 2.4*   Total Bilirubin mg/dL 0.3   Alkaline Phosphatase U/L 104   AST U/L 17   ALT U/L 6*       Vancomycin Administrations:  vancomycin given in the last 96 hours                   vancomycin 1.5 g in 5 % dextrose 250 mL IVPB (mg) 1,500 mg New Bag 06/07/20 2225                Microbiologic Results:  Microbiology Results (last 7 days)     Procedure Component Value Units Date/Time    Blood Culture #1  **CANNOT BE ORDERED STAT** [[id number]] Collected:  06/07/20 2049    Order Status:  Sent Specimen:  Blood from Peripheral, Forearm, Left Updated:  06/08/20 0840    Blood Culture #2 **CANNOT BE ORDERED STAT** [[id number]] Collected:  06/07/20 2129    Order Status:  Sent Specimen:  Blood Updated:  06/08/20 0814

## 2020-06-08 NOTE — ASSESSMENT & PLAN NOTE
23 yo MTF transgender female w type 1 diabetes presents with recurring perianal abscess with cavity tracking to rectum on CT, significant devitalized tissue in cavity. Also with chronic diarrhea of uncertain etiology  1. Plan to take to OR Wednesday for debridement, I&D, exam under anesthesia, possible drain/seton placement. Continue ceftriaxone. Vancomycin not needed for this type of infection, but would add metronidazole for anaerobic coverage. OK to continue on regular diet, make NPO Tuesday night at midnight  2. Diarrhea- would check C Diff, stool cx and O&P

## 2020-06-08 NOTE — ED PROVIDER NOTES
Encounter Date: 6/7/2020    SCRIBE #1 NOTE: Peter CALDERA, am scribing for, and in the presence of, Dr. Schaffer.       History     Chief Complaint   Patient presents with    Abscess     right buttock was seen at [facility] today left AMA on medsurg waiting on surgical intervention     This is a 24 y.o. male to female transgender with complaint of right gluteal abscess the began five days ago. The patient was seen at [facility] on 06/02/2020 for the abscess and fever.  She was then admitted for further evaluation and management.  She has had two I&D bedside procedures, most recently 2 days ago, with packing.  She was told that the abscess was too expensive and would require surgical drainage, but they wanted to wait for a negative COVID exam. She felt that she was being discriminated against and left the hospital AMA. She came here for further evaluation.     The history is provided by the patient.     Review of patient's allergies indicates:   Allergen Reactions    Shrimp      Past Medical History:   Diagnosis Date    Diabetes mellitus      Past Surgical History:   Procedure Laterality Date    COLONOSCOPY      TOE SURGERY       History reviewed. No pertinent family history.  Social History     Tobacco Use    Smoking status: Never Smoker    Smokeless tobacco: Never Used   Substance Use Topics    Alcohol use: Yes     Comment: occasionally    Drug use: Not Currently     Review of Systems   Constitutional: Positive for fever.   HENT: Negative for sore throat.    Respiratory: Negative for shortness of breath.    Cardiovascular: Negative for chest pain.   Gastrointestinal: Negative for nausea.   Genitourinary: Negative for dysuria.   Musculoskeletal: Negative for back pain.   Skin: Positive for color change. Negative for rash.        Positive for a right gluteal abscess.   Neurological: Negative for weakness.   Hematological: Does not bruise/bleed easily.       Physical Exam     Initial Vitals [06/07/20 2052]   BP Pulse Resp  Temp SpO2   132/73 90 16 98.6 °F (37 °C) 100 %      MAP       --         Physical Exam    Nursing note and vitals reviewed.  Constitutional: He appears well-developed and well-nourished. He is not diaphoretic. No distress.   Febrile.   HENT:   Head: Normocephalic and atraumatic.   Mouth/Throat: Oropharynx is clear and moist.   Eyes: EOM are normal. Pupils are equal, round, and reactive to light. Right eye exhibits no discharge. Left eye exhibits no discharge.   Neck: Normal range of motion.   Cardiovascular: Regular rhythm and normal heart sounds. Tachycardia present.    Pulmonary/Chest: Breath sounds normal. No respiratory distress. He has no wheezes. He has no rhonchi. He has no rales.   Abdominal: Soft. There is no tenderness. There is no rebound and no guarding.   Musculoskeletal: Normal range of motion. He exhibits no edema or tenderness.   Neurological: He is alert and oriented to person, place, and time.   Skin: Skin is warm and dry. No rash noted. No erythema. No pallor.   6 cm open wound to the right gluteal region with packing and foul drainage with surrounding swelling, induration and tenderness   Psychiatric: He has a normal mood and affect. His behavior is normal. Judgment and thought content normal.         ED Course   Procedures  Labs Reviewed   COMPREHENSIVE METABOLIC PANEL - Abnormal; Notable for the following components:       Result Value    Sodium 131 (*)     Glucose 317 (*)     Total Protein 9.9 (*)     Albumin 2.4 (*)     ALT 6 (*)     All other components within normal limits   CBC W/ AUTO DIFFERENTIAL - Abnormal; Notable for the following components:    RBC 3.34 (*)     Hemoglobin 9.0 (*)     Hematocrit 26.8 (*)     Mean Corpuscular Volume 80 (*)     Mean Corpuscular Hemoglobin 26.9 (*)     RDW 14.6 (*)     All other components within normal limits   CULTURE, BLOOD   CULTURE, BLOOD   LACTIC ACID, PLASMA   SARS-COV-2 RNA AMPLIFICATION, QUAL          Imaging Results    None          Medical  Decision Making:   History:   Old Medical Records: I decided to obtain old medical records.  Initial Assessment:   8:12PM:  Patient is a 24-year-old male who presents to the emergency department with a gluteal abscess.  Patient appears well, nontoxic.  She was febrile per EMS but afebrile now.  She does have a large wound to the right gluteal region, with associated foul drainage.  [facility] was planning to take the patient to the OR for surgical drainage.  Given that she is COVID positive, she will likely need to be transferred.  I did explain this to the patient.  Will continue to follow and reassess.  Clinical Tests:   Radiological Study: Ordered and Reviewed  Other:   I have discussed this case with another health care provider.      9:20PM:  Patient is COVID negative here, but given that I am able to view the COVID positive result from this morning at [facility], I feel that the patient would best benefit from transfer.  Her labs do not indicate any evidence of DKA.  Will plan to initiate the IV antibiotics she was on previously.  I discussed with the transfer center, will start to initiate the transfer to [facility].    9:43 PM:  I discussed the pt with Dr. Hernandez, who accepts transfer for further management.              Scribe Attestation:   Scribe #1: I performed the above scribed service and the documentation accurately describes the services I performed. I attest to the accuracy of the note.    Attending Attestation:           Physician Attestation for Scribe:  Physician Attestation Statement for Scribe #1: I, Dr. Schaffer, reviewed documentation, as scribed by Peter Jordan in my presence, and it is both accurate and complete.                               Clinical Impression:     1. Gluteal abscess              ED Disposition Condition    Transfer to Another Facility Stable                          Carlita Schaffer MD  06/07/20 8928

## 2020-06-08 NOTE — ED NOTES
Patient rounding complete. Pt sleeping in stretcher,chest rise and fall noted. Pt not c/o pain at this time. Pt up to date on POC. Call light in reach. Will continue to monitor.

## 2020-06-08 NOTE — CONSULTS
"[facility]  Colorectal Surgery  Consult Note    Patient Name: Pratik Torres  MRN: [id number]  Admission Date: 6/7/2020  Hospital Length of Stay: 0 days  Attending Physician: Flower Saldivar MD  Primary Care Provider: Felipe Summers MD    Consults  Subjective:     Chief Complaint/Reason for Admission: perianal abscess    History of Present Illness:  24 year old MTF transwoman presents with gluteal/perianal abscess. Reports having "too many to count" perianal abscesses over the years that have required I&D. No history of fistula or seton placement. Most recently developed R perianal pain and presented to [facility] last week, where she underwent bedside I&D x2. Was discharged and presented here with persistent perianal pain and drainage from wound.     Patient also reports over the past year intractable diarrhea and difficulty controlling bowels. Has been worked up by outside GI including colonoscopy which patient reports has been negative for IBD. Takes loperamide but this does not help. Denies any history of feculent drainage from abscesses- only reports pus draining in past.     PTA Medications   Medication Sig    albuterol (PROVENTIL/VENTOLIN HFA) 90 mcg/actuation inhaler Inhale 1-2 puffs into the lungs every 6 (six) hours as needed for Wheezing. Rescue    estradioL (ESTRACE) 1 MG tablet Take 1 mg by mouth once daily.    insulin aspart U-100 (NOVOLOG) 100 unit/mL injection Inject 10 Units into the skin 3 (three) times daily before meals.    insulin glargine (LANTUS SOLOSTAR) 100 unit/mL (3 mL) InPn pen Inject 15 Units into the skin every evening. (Patient taking differently: Inject 40 Units into the skin every evening. )    loperamide (IMODIUM) 2 mg capsule Take 2 mg by mouth 2 (two) times a day.    spironolactone (ALDACTONE) 25 MG tablet Take 25 mg by mouth once daily.       Review of patient's allergies indicates:   Allergen Reactions    Shrimp        Past Medical History: "   Diagnosis Date    Diabetes mellitus      Past Surgical History:   Procedure Laterality Date    COLONOSCOPY      TOE SURGERY       Family History     None        Tobacco Use    Smoking status: Never Smoker    Smokeless tobacco: Never Used   Substance and Sexual Activity    Alcohol use: Yes     Comment: occasionally    Drug use: Not Currently    Sexual activity: Not on file     Review of Systems   Constitutional: Negative.    HENT: Negative.    Eyes: Negative.    Respiratory: Negative.    Cardiovascular: Negative.    Gastrointestinal: Positive for diarrhea and rectal pain.   Genitourinary: Negative.    Musculoskeletal: Negative.    Allergic/Immunologic: Negative.    Neurological: Negative.    Hematological: Negative.    Psychiatric/Behavioral: Negative.      Objective:     Vital Signs (Most Recent):  Temp: 98.1 °F (36.7 °C) (06/08/20 1506)  Pulse: 100 (06/08/20 1506)  Resp: 12 (06/08/20 1506)  BP: 118/66 (06/08/20 1506)  SpO2: 100 % (06/08/20 1506) Vital Signs (24h Range):  Temp:  [97.7 °F (36.5 °C)-98.6 °F (37 °C)] 98.1 °F (36.7 °C)  Pulse:  [] 100  Resp:  [12-18] 12  SpO2:  [100 %] 100 %  BP: (105-147)/(50-85) 118/66     Weight: 83.9 kg (185 lb)  Body mass index is 25.09 kg/m².    Physical Exam   Constitutional: He is oriented to person, place, and time. He appears well-developed and well-nourished.   HENT:   Head: Normocephalic.   Eyes: Pupils are equal, round, and reactive to light.   Neck: Normal range of motion.   Cardiovascular: Normal rate and regular rhythm.   Pulmonary/Chest: Effort normal.   Abdominal: Soft. He exhibits no distension.   Musculoskeletal: Normal range of motion.   Neurological: He is alert and oriented to person, place, and time.   Skin: Skin is warm and dry.       Anorectal Exam:            Significant Labs:  BMP (Last 3 Results):   Recent Labs   Lab 06/07/20 2049 06/08/20  0754 06/08/20  1311   * 257* 240*  240*   * 132* 133*  133*   K 4.1 4.0 4.0  4.0   CL  97 100 101  101   CO2 24 25 26  26   BUN 11 9 9  9   CREATININE 1.4 1.1 1.0  1.0   CALCIUM 9.4 9.7 9.3  9.3   MG  --  1.7 1.7  1.7     CBC (Last 3 Results):   Recent Labs   Lab 06/07/20 2049 06/08/20  1311   WBC 10.01 8.61  8.61   RBC 3.34* 3.29*  3.29*   HGB 9.0* 9.0*  9.0*   HCT 26.8* 27.7*  27.7*    360*  360*   MCV 80* 84  84   MCH 26.9* 27.4  27.4   MCHC 33.6 32.5  32.5     CRP (Last 3 Results):   Recent Labs   Lab 06/08/20  0754   CRP 94.3*       Significant Diagnostics:  I have reviewed all pertinent imaging results/findings within the past 24 hours.     EXAMINATION:  CT PELVIS WITH  CONTRAST    CLINICAL HISTORY:  gluteal abscess;    TECHNIQUE:  Low dose axial images, sagittal and coronal reformations were obtained from the iliac crests to the pubic symphysis after the administration of 75 cc Omnipaque 350 intravenous contrast.  Oral contrast was not administered.  Patient unable to lay completely supine secondary to discomfort.    COMPARISON:  None    FINDINGS:  BOWEL/MESENTERY: No evidence of bowel obstruction or inflammatory process.    PROSTATE: Normal.    URINARY BLADDER: No bladder wall thickening or focal wall irregularity.    VASCULATURE: No distal abdominal aortic aneurysm. No significant atherosclerotic calcification.    BONES: Subcentimeter densely sclerotic lesion in the left femoral head, likely benign bone island.  No acute fracture or bony destructive process.    EXTRAPERITONEAL SOFT TISSUES: There is a 6.5 x 2.7 x 6.8 cm collection air and fluid in the perirectal soft tissues with surrounding fat stranding and skin thickening superficial to the collection.  The collection appears to be in communication with the skin.  There is a tiny tract of fluid extending across midline to the left of the rectum.  No definite fistulous connection with the bowel identified.    OTHER: Multiple prominent bilateral inguinal lymph nodes, right greater than left.  No free pelvic free fluid  or mass.      Impression       7 cm right perirectal abscess communicating with the skin surface.    This report was flagged in Epic as abnormal.    Electronically signed by resident: Gerardo Conti  Date: 06/08/2020  Time: 10:51         Assessment/Plan:     Gluteal abscess  25 yo MTF transgender female w type 1 diabetes presents with recurring perianal abscess with cavity tracking to rectum on CT, significant devitalized tissue in cavity. Also with chronic diarrhea of uncertain etiology  1. Plan to take to OR Wednesday for debridement, I&D, exam under anesthesia, possible drain/seton placement. Continue ceftriaxone. Vancomycin not needed for this type of infection, but would add metronidazole for anaerobic coverage. OK to continue on regular diet, make NPO Tuesday night at midnight  2. Diarrhea- would check C Diff, stool cx and O&P        Thank you for your consult. I will follow-up with patient. Please contact us if you have any additional questions.    Jae Reyes MD  Colorectal Surgery  [facility]

## 2020-06-08 NOTE — CONSULTS
[facility]  General Surgery  Consult Note    Patient Name: Pratik Torres  MRN: [id number]  Code Status: Full Code  Admission Date: 6/7/2020  Hospital Length of Stay: 0 days  Attending Physician: Flower Saldivar MD  Primary Care Provider: Felipe Summers MD    Patient information was obtained from patient, past medical records and ER records.     Inpatient consult to General Surgery  Consult performed by: DAI Perry MD  Consult ordered by: Faith Rai MD  Reason for consult: right gluteal abscess        Subjective:     Principal Problem: <principal problem not specified>    History of Present Illness: 25yo male with uncontrolled DM1 and recent positive Covid test who presents as transfer for right gluteal abscess. He has history of multiple gluteal abscesses over the last couple years, often requiring I&D. He states they have occurred on both sides. He has chronic diarrhea and abdominal cramping. He has colonoscopy in Jun 2019, which per patient report was negative for abnormal findings.    He had two bedside I&Ds at [facility] preceding this admission. He left there due to feeling discriminated against based on his gender identity. He was planned for operative exploration there at the time of his departure.    No current facility-administered medications on file prior to encounter.      Current Outpatient Medications on File Prior to Encounter   Medication Sig    albuterol (PROVENTIL/VENTOLIN HFA) 90 mcg/actuation inhaler Inhale 1-2 puffs into the lungs every 6 (six) hours as needed for Wheezing. Rescue    estradioL (ESTRACE) 1 MG tablet Take 1 mg by mouth once daily.    insulin aspart U-100 (NOVOLOG) 100 unit/mL injection Inject 10 Units into the skin 3 (three) times daily before meals.    insulin glargine (LANTUS SOLOSTAR) 100 unit/mL (3 mL) InPn pen Inject 15 Units into the skin every evening. (Patient taking differently: Inject 40 Units into the skin every evening.  )    loperamide (IMODIUM) 2 mg capsule Take 2 mg by mouth 2 (two) times a day.    spironolactone (ALDACTONE) 25 MG tablet Take 25 mg by mouth once daily.    [DISCONTINUED] insulin lispro (HUMALOG) 100 unit/mL injection Inject into the skin 3 (three) times daily before meals.       Review of patient's allergies indicates:   Allergen Reactions    Shrimp        Past Medical History:   Diagnosis Date    Diabetes mellitus      Past Surgical History:   Procedure Laterality Date    COLONOSCOPY      TOE SURGERY       Family History     None        Tobacco Use    Smoking status: Never Smoker    Smokeless tobacco: Never Used   Substance and Sexual Activity    Alcohol use: Yes     Comment: occasionally    Drug use: Not Currently    Sexual activity: Not on file     Review of Systems   Constitutional: Positive for fatigue and fever.   HENT: Negative.    Eyes: Negative.    Respiratory: Negative.  Negative for shortness of breath.    Cardiovascular: Negative.  Negative for chest pain.   Gastrointestinal: Positive for abdominal pain and diarrhea. Negative for blood in stool, constipation, nausea, rectal pain and vomiting.   Endocrine: Negative.    Genitourinary: Negative.    Musculoskeletal: Negative.    Skin: Negative.    Allergic/Immunologic: Negative.    Neurological: Negative.    Hematological: Negative.    Psychiatric/Behavioral: Negative.      Objective:     Vital Signs (Most Recent):  Temp: 98.1 °F (36.7 °C) (06/08/20 1506)  Pulse: 100 (06/08/20 1506)  Resp: 12 (06/08/20 1506)  BP: 118/66 (06/08/20 1506)  SpO2: 100 % (06/08/20 1506) Vital Signs (24h Range):  Temp:  [97.7 °F (36.5 °C)-98.6 °F (37 °C)] 98.1 °F (36.7 °C)  Pulse:  [] 100  Resp:  [12-18] 12  SpO2:  [100 %] 100 %  BP: (105-147)/(50-85) 118/66     Weight: 83.9 kg (185 lb)  Body mass index is 25.09 kg/m².    Physical Exam   Constitutional: He is oriented to person, place, and time. He appears well-developed and well-nourished. No distress.    Cardiovascular: Normal rate and regular rhythm.   Pulmonary/Chest: Effort normal. No respiratory distress.   Abdominal: Soft. He exhibits no distension.   Genitourinary:   Genitourinary Comments: Right gluteal abscess cavity with purulent drainage with necrotic tissue present; tracks down to the rectum  BRIGHT deferred due to patient discomfort with exam of abscess cavity and likely visit to the OR   Musculoskeletal: He exhibits no edema.   Neurological: He is alert and oriented to person, place, and time.   Skin: Skin is warm and dry. He is not diaphoretic.   Nursing note and vitals reviewed.      Significant Labs:  CBC:   Recent Labs   Lab 06/08/20  1311   WBC 8.61  8.61   RBC 3.29*  3.29*   HGB 9.0*  9.0*   HCT 27.7*  27.7*   *  360*   MCV 84  84   MCH 27.4  27.4   MCHC 32.5  32.5     CMP:   Recent Labs   Lab 06/08/20  1311   *  240*   CALCIUM 9.3  9.3   ALBUMIN 2.3*  2.3*   PROT 10.0*  10.0*   *  133*   K 4.0  4.0   CO2 26  26     101   BUN 9  9   CREATININE 1.0  1.0   ALKPHOS 101  101   ALT 6*  6*   AST 10  10   BILITOT 0.3  0.3       Significant Diagnostics:  I have reviewed all pertinent imaging results/findings within the past 24 hours.     CT shows celsa-rectal involvement    Assessment/Plan:     Gluteal abscess  25yo male who identifies as female with right gluteal abscess s/p 2x I&D at [facility] with uncontrolled DM1. This, along with chronic diarrhea and abdominal cramps, has been a chronic issue for years. Negative C-scope in early 2019.    - Abscess cavity needs debridement, which will not be tolerated at bedside  - CT shows abscess cavity abutting the rectum  - Recommend colorectal surgery consult  - If CRS declines surgery, then we will plan to take her to the OR tomorrow for debridement and exam under anesthesia      VTE Risk Mitigation (From admission, onward)         Ordered     Place sequential compression device  Until discontinued      06/08/20 2202      IP VTE HIGH RISK PATIENT  Once      06/08/20 0658                Thank you for your consult. I will follow-up with patient. Please contact us if you have any additional questions.    ALEJANDRINA Perry MD  General Surgery  [facility]

## 2020-06-08 NOTE — PLAN OF CARE
(Physician in Lead of Transfers)   Outside Transfer Acceptance Note / Regional Referral Center    Upon patient arrival to floor, please call extension [phone] (if no answer, this will flip to a beeper, so enter your call back number) for Hospital Medicine admit team assignment and for additional admit orders for the patient.  Do not page the attending physician associated with the patient on arrival (this physician may not be on duty at the time of arrival).  Rather, always call [phone] to reach the triage physician for orders and team assignment.    Transferring Physician: Carlita Schaffer MD    Accepting Physician: Riley Hernandez MD    Date of Acceptance: 06/07/2020    Transferring Facility: [facility] LOCATION ([facility])     Reason for Transfer: COVID status    Report from Transferring Physician/Hospital course:  Patient is a 24 y.o. male who has a past medical history of Diabetes mellitus presented with right gluteal abscess. Patient was recently admitted [facility] for this and had 2 bedside I&D procedures done. She was being treated with IV abx but patient left AMA because she was unhappy with care. She presented to [facility] ED right after seeking admission. She has 6 cm open sound to right gluteal region with packing and foul drainage. She is also febrile and tachycardic. WBC and lactic acid normal. She was restarted on IV Vanc and cefepime. She tested positive for COVID and facility seeking transfer due to COVID status.     ED MD HPI:  This is a 24 y.o. male to female transgender with complaint of right gluteal abscess the began five days ago. The patient was seen at [facility] on 06/02/2020 for the abscess and fever.  She was then admitted for further evaluation and management.  She has had two I&D bedside procedures, most recently 2 days ago, with packing.  She was told that the abscess was too expensive and would require surgical drainage, but they wanted to wait for a negative COVID exam. She felt that she was being  discriminated against and left the hospital AMA. She came here for further evaluation.     Vital Signs (Most Recent):  Temp: 98.6 °F (37 °C) (06/07/20 2052)  Pulse: 90 (06/07/20 2102)  Resp: 16 (06/07/20 2052)  BP: 138/81 (06/07/20 2102)  SpO2: 100 % (06/07/20 2102) Vital Signs (24h Range):  Temp:  [98.6 °F (37 °C)] 98.6 °F (37 °C)  Pulse:  [90] 90  Resp:  [16] 16  SpO2:  [100 %] 100 %  BP: (132-138)/(73-81) 138/81       Labs & Radiographs: see EPIC    Significant Labs:   Blood Culture: No results for input(s): LABBLOO in the last 48 hours., CMP:   Recent Labs   Lab 06/07/20 2049   *   K 4.1   CL 97   CO2 24   *   BUN 11   CREATININE 1.4   CALCIUM 9.4   PROT 9.9*   ALBUMIN 2.4*   BILITOT 0.3   ALKPHOS 104   AST 17   ALT 6*   ANIONGAP 10   ESTGFRAFRICA >60   EGFRNONAA >60   , CBC:   Recent Labs   Lab 06/07/20 2049   WBC 10.01   HGB 9.0*   HCT 26.8*      , INR: No results for input(s): INR, PROTIME in the last 48 hours. and Troponin No results for input(s): TROPONINI in the last 48 hours.    Significant Imaging: none      To Do List:   1. Admit to  COVID unit  2. COVID precautions   3. Cont IV abx   4. Consult general surgery  5. DM management.       Upon patient arrival to floor, please call extension [phone] (if no answer, this will flip to a beeper, so enter your call back number) for Hospital Medicine admit team assignment and for additional admit orders for the patient.  Do not page the attending physician associated with the patient on arrival (this physician may not be on duty at the time of arrival).  Rather, always call [phone] to reach the triage physician for orders and team assignment.      Riley Hernandez MD  Hospital Medicine Staff

## 2020-06-08 NOTE — PLAN OF CARE
- VS remain stable, no s/s sepsis  - Culture of abscess sent per primary team instruction.  Packed with wet/dry gauze per instructions.  - General surgery saw patient at bedside, deferred to Colorectal surgery  - CRS at bedside to photo-document abscess & evaluate.  Plan for procedure tomorrow.  Will obtain consents in AM.  Keep NPO p MN.  Gauze placed over wound by MD, no packing at this time.  - CBG ACHS, SSI as indicated, expect glucose to increase due to resuming diet this afternoon

## 2020-06-08 NOTE — PROGRESS NOTES
Nutrition-Related Diabetes Education      Time Spent: 10 minutes    Learners: patient    Current HbA1c: 12.2    Is patient aware of their A1c and their goal A1c?       __<7.0___ yes    Home diabetes medication(s): insulin    Nutrition Education with handouts: none- pt refused    Comments:  Discussed DM diet with pt, he states he does not follow this diet at home. States sugars have been well controlled at home, explained A1c test to pt. Verbalized understanding but continues to state he does not follow the DM diet and is not interested in education. Pt discussed how he is told to eat more and gain wt, discussed increasing intake while still following DM diet and focus on protein. Pt verbalized understanding but with no motivation to make diet changes. Will continue to monitor.       Barriers to Learning: not motivated, uninterested in DM diet     Follow up: 6/15/20    Please consult as needed.  Thank you!  Flower Palmer RD, LDN

## 2020-06-08 NOTE — HPI
25yo male with uncontrolled DM1 and recent positive Covid test who presents as transfer for right gluteal abscess. He has history of multiple gluteal abscesses over the last couple years, often requiring I&D. He states they have occurred on both sides. He has chronic diarrhea and abdominal cramping. He has colonoscopy in Jun 2019, which per patient report was negative for abnormal findings.    He had two bedside I&Ds at [facility] preceding this admission. He left there due to feeling discriminated against based on his gender identity. He was planned for operative exploration there at the time of his departure.

## 2020-06-08 NOTE — PROGRESS NOTES
Wound care consulted for gluteal abscess, right buttocks and several I & D sites.  General surgery consulted.  Wound care will await  Wound recommendations from general surgery.   MICHELE Nuno RN, [facility]  [id number]

## 2020-06-08 NOTE — SUBJECTIVE & OBJECTIVE
PTA Medications   Medication Sig    albuterol (PROVENTIL/VENTOLIN HFA) 90 mcg/actuation inhaler Inhale 1-2 puffs into the lungs every 6 (six) hours as needed for Wheezing. Rescue    estradioL (ESTRACE) 1 MG tablet Take 1 mg by mouth once daily.    insulin aspart U-100 (NOVOLOG) 100 unit/mL injection Inject 10 Units into the skin 3 (three) times daily before meals.    insulin glargine (LANTUS SOLOSTAR) 100 unit/mL (3 mL) InPn pen Inject 15 Units into the skin every evening. (Patient taking differently: Inject 40 Units into the skin every evening. )    loperamide (IMODIUM) 2 mg capsule Take 2 mg by mouth 2 (two) times a day.    spironolactone (ALDACTONE) 25 MG tablet Take 25 mg by mouth once daily.       Review of patient's allergies indicates:   Allergen Reactions    Shrimp        Past Medical History:   Diagnosis Date    Diabetes mellitus      Past Surgical History:   Procedure Laterality Date    COLONOSCOPY      TOE SURGERY       Family History     None        Tobacco Use    Smoking status: Never Smoker    Smokeless tobacco: Never Used   Substance and Sexual Activity    Alcohol use: Yes     Comment: occasionally    Drug use: Not Currently    Sexual activity: Not on file     Review of Systems   Constitutional: Negative.    HENT: Negative.    Eyes: Negative.    Respiratory: Negative.    Cardiovascular: Negative.    Gastrointestinal: Positive for diarrhea and rectal pain.   Genitourinary: Negative.    Musculoskeletal: Negative.    Allergic/Immunologic: Negative.    Neurological: Negative.    Hematological: Negative.    Psychiatric/Behavioral: Negative.      Objective:     Vital Signs (Most Recent):  Temp: 98.1 °F (36.7 °C) (06/08/20 1506)  Pulse: 100 (06/08/20 1506)  Resp: 12 (06/08/20 1506)  BP: 118/66 (06/08/20 1506)  SpO2: 100 % (06/08/20 1506) Vital Signs (24h Range):  Temp:  [97.7 °F (36.5 °C)-98.6 °F (37 °C)] 98.1 °F (36.7 °C)  Pulse:  [] 100  Resp:  [12-18] 12  SpO2:  [100 %] 100 %  BP:  (105-147)/(50-85) 118/66     Weight: 83.9 kg (185 lb)  Body mass index is 25.09 kg/m².    Physical Exam   Constitutional: He is oriented to person, place, and time. He appears well-developed and well-nourished.   HENT:   Head: Normocephalic.   Eyes: Pupils are equal, round, and reactive to light.   Neck: Normal range of motion.   Cardiovascular: Normal rate and regular rhythm.   Pulmonary/Chest: Effort normal.   Abdominal: Soft. He exhibits no distension.   Musculoskeletal: Normal range of motion.   Neurological: He is alert and oriented to person, place, and time.   Skin: Skin is warm and dry.       Anorectal Exam:            Significant Labs:  BMP (Last 3 Results):   Recent Labs   Lab 06/07/20 2049 06/08/20  0754 06/08/20  1311   * 257* 240*  240*   * 132* 133*  133*   K 4.1 4.0 4.0  4.0   CL 97 100 101  101   CO2 24 25 26  26   BUN 11 9 9  9   CREATININE 1.4 1.1 1.0  1.0   CALCIUM 9.4 9.7 9.3  9.3   MG  --  1.7 1.7  1.7     CBC (Last 3 Results):   Recent Labs   Lab 06/07/20 2049 06/08/20  1311   WBC 10.01 8.61  8.61   RBC 3.34* 3.29*  3.29*   HGB 9.0* 9.0*  9.0*   HCT 26.8* 27.7*  27.7*    360*  360*   MCV 80* 84  84   MCH 26.9* 27.4  27.4   MCHC 33.6 32.5  32.5     CRP (Last 3 Results):   Recent Labs   Lab 06/08/20  0754   CRP 94.3*       Significant Diagnostics:  I have reviewed all pertinent imaging results/findings within the past 24 hours.     EXAMINATION:  CT PELVIS WITH  CONTRAST    CLINICAL HISTORY:  gluteal abscess;    TECHNIQUE:  Low dose axial images, sagittal and coronal reformations were obtained from the iliac crests to the pubic symphysis after the administration of 75 cc Omnipaque 350 intravenous contrast.  Oral contrast was not administered.  Patient unable to lay completely supine secondary to discomfort.    COMPARISON:  None    FINDINGS:  BOWEL/MESENTERY: No evidence of bowel obstruction or inflammatory process.    PROSTATE: Normal.    URINARY BLADDER: No  bladder wall thickening or focal wall irregularity.    VASCULATURE: No distal abdominal aortic aneurysm. No significant atherosclerotic calcification.    BONES: Subcentimeter densely sclerotic lesion in the left femoral head, likely benign bone island.  No acute fracture or bony destructive process.    EXTRAPERITONEAL SOFT TISSUES: There is a 6.5 x 2.7 x 6.8 cm collection air and fluid in the perirectal soft tissues with surrounding fat stranding and skin thickening superficial to the collection.  The collection appears to be in communication with the skin.  There is a tiny tract of fluid extending across midline to the left of the rectum.  No definite fistulous connection with the bowel identified.    OTHER: Multiple prominent bilateral inguinal lymph nodes, right greater than left.  No free pelvic free fluid or mass.      Impression       7 cm right perirectal abscess communicating with the skin surface.    This report was flagged in Epic as abnormal.    Electronically signed by resident: Gerardo Conti  Date: 06/08/2020  Time: 10:51

## 2020-06-08 NOTE — ED TRIAGE NOTES
"Pt reports to ED via EMS with c/o abscess to R buttock since Tuesday. Pt was admitted at [facility], but eloped today after a 5 day admit awaiting surgery. Pt states she eloped due to feeling "disriminated against for being transgender" and the doctor not performing the surgery Saturday as stated. Abscess was lanced Friday night. Pt tested COVID + today and reports some weakness. EMS reports fever of 100.5.   "

## 2020-06-08 NOTE — ED NOTES
Patient rounding complete. Pt sleeping in stretcher, chest rise and fall noted.  Pt not c/o pain at this time. Pt up to date on POC. Call light in reach. Will continue to monitor.

## 2020-06-08 NOTE — ASSESSMENT & PLAN NOTE
23yo male who identifies as female with right gluteal abscess s/p 2x I&D at [facility] with uncontrolled DM1. This, along with chronic diarrhea and abdominal cramps, has been a chronic issue for years. Negative C-scope in early 2019.    - Abscess cavity needs debridement, which will not be tolerated at bedside  - CT shows abscess cavity abutting the rectum  - Recommend colorectal surgery consult  - If CRS declines surgery, then we will plan to take her to the OR tomorrow for debridement and exam under anesthesia

## 2020-06-08 NOTE — HOSPITAL COURSE
Admitted to hospital medicine on 6/8 for perirectal abscess associated with fevers. Started on IV vancomycin, ceftriaxone, and metronidazole. CT abdomen/pelvis on 6/8 showed 7 cm right perirectal abscess communicating with the skin surface. General surgery consulted and recommended colorectal surgery consult. CRS plan to take to OR on 6/10. Started basal-bolus insulin regimen and continued home estradiol and spironolactone. Ceftriaxone switched to cefepime given history of diabetes and desire to provide pseudomonal coverage. C diff positive so started PO vancomycin. Wound culture grew e coli. Changed IV antibiotics to ampicillin-sulbactam. S/P gluteal abscess I&D on 6/10 with pus tracking down to sacrum. CRS believes that she may need flap down the road with plastic surgery. Patient had bump of Cr 1.1-> 1.5, thus held spironolactone and gave 1L fluid. Continuing ampicillin-sulbactam per ID recommendations pending intraoperative culture results - now growing anaerobic gram negative choco Parabacteroides distasonis. ID recommends completing 14 day course of amoxicillin-clavulanate (start date after I&D) (last dose will be on 6/24) and continuing PO vancomycin for additional 5 days after finishing amox-clav (last dose of PO vanc should be on 6/29). Patient will take long-acting insulin 25 units BID and 10 units short-acting TID with meals. Wound care was consulted, however, patient would not allow wound care to assess and pack her wound. Spoke with patient multiple times to emphasize importance of wound care. She She will follow-up with wound care clinic at [facility] in one week, with CRS Dr. Santo in one week, and patient has PCP appointment with Susanna Lind at [facility] on 6/17. Ambulatory referral placed for social work per patient's request. Reviewed medications, instructions, and appointments with patient. All questions answered to her satisfaction.

## 2020-06-09 ENCOUNTER — ANESTHESIA EVENT (OUTPATIENT)
Dept: SURGERY | Facility: HOSPITAL | Age: 25
DRG: 333 | End: 2020-06-09
Payer: MEDICAID

## 2020-06-09 LAB
ALBUMIN SERPL BCP-MCNC: 2.3 G/DL (ref 3.5–5.2)
ALP SERPL-CCNC: 99 U/L (ref 55–135)
ALT SERPL W/O P-5'-P-CCNC: 6 U/L (ref 10–44)
ANION GAP SERPL CALC-SCNC: 6 MMOL/L (ref 8–16)
AST SERPL-CCNC: 10 U/L (ref 10–40)
B-OH-BUTYR BLD STRIP-SCNC: 0 MMOL/L (ref 0–0.5)
BASOPHILS # BLD AUTO: 0.02 K/UL (ref 0–0.2)
BASOPHILS NFR BLD: 0.2 % (ref 0–1.9)
BILIRUB SERPL-MCNC: 0.2 MG/DL (ref 0.1–1)
BUN SERPL-MCNC: 9 MG/DL (ref 6–20)
CALCIUM SERPL-MCNC: 9 MG/DL (ref 8.7–10.5)
CHLORIDE SERPL-SCNC: 99 MMOL/L (ref 95–110)
CO2 SERPL-SCNC: 27 MMOL/L (ref 23–29)
CREAT SERPL-MCNC: 1.1 MG/DL (ref 0.5–1.4)
DIFFERENTIAL METHOD: ABNORMAL
EOSINOPHIL # BLD AUTO: 0.2 K/UL (ref 0–0.5)
EOSINOPHIL NFR BLD: 2 % (ref 0–8)
ERYTHROCYTE [DISTWIDTH] IN BLOOD BY AUTOMATED COUNT: 14.5 % (ref 11.5–14.5)
EST. GFR  (AFRICAN AMERICAN): >60 ML/MIN/1.73 M^2
EST. GFR  (NON AFRICAN AMERICAN): >60 ML/MIN/1.73 M^2
GLUCOSE SERPL-MCNC: 297 MG/DL (ref 70–110)
HCT VFR BLD AUTO: 27.9 % (ref 40–54)
HGB BLD-MCNC: 8.9 G/DL (ref 14–18)
IMM GRANULOCYTES # BLD AUTO: 0.07 K/UL (ref 0–0.04)
IMM GRANULOCYTES NFR BLD AUTO: 0.8 % (ref 0–0.5)
IRON SERPL-MCNC: 24 UG/DL (ref 45–160)
LYMPHOCYTES # BLD AUTO: 2.5 K/UL (ref 1–4.8)
LYMPHOCYTES NFR BLD: 30.2 % (ref 18–48)
MAGNESIUM SERPL-MCNC: 1.7 MG/DL (ref 1.6–2.6)
MCH RBC QN AUTO: 26.8 PG (ref 27–31)
MCHC RBC AUTO-ENTMCNC: 31.9 G/DL (ref 32–36)
MCV RBC AUTO: 84 FL (ref 82–98)
MONOCYTES # BLD AUTO: 1 K/UL (ref 0.3–1)
MONOCYTES NFR BLD: 11.6 % (ref 4–15)
NEUTROPHILS # BLD AUTO: 4.6 K/UL (ref 1.8–7.7)
NEUTROPHILS NFR BLD: 55.2 % (ref 38–73)
NRBC BLD-RTO: 0 /100 WBC
PHOSPHATE SERPL-MCNC: 4 MG/DL (ref 2.7–4.5)
PLATELET # BLD AUTO: 358 K/UL (ref 150–350)
PMV BLD AUTO: 10.3 FL (ref 9.2–12.9)
POCT GLUCOSE: 298 MG/DL (ref 70–110)
POCT GLUCOSE: 329 MG/DL (ref 70–110)
POCT GLUCOSE: 348 MG/DL (ref 70–110)
POCT GLUCOSE: 364 MG/DL (ref 70–110)
POTASSIUM SERPL-SCNC: 3.9 MMOL/L (ref 3.5–5.1)
PROT SERPL-MCNC: 9.9 G/DL (ref 6–8.4)
RBC # BLD AUTO: 3.32 M/UL (ref 4.6–6.2)
RETICS/RBC NFR AUTO: 1.4 % (ref 0.4–2)
SATURATED IRON: 10 % (ref 20–50)
SODIUM SERPL-SCNC: 132 MMOL/L (ref 136–145)
TOTAL IRON BINDING CAPACITY: 250 UG/DL (ref 250–450)
TRANSFERRIN SERPL-MCNC: 169 MG/DL (ref 200–375)
TRANSFERRIN SERPL-MCNC: 169 MG/DL (ref 200–375)
VANCOMYCIN SERPL-MCNC: 27.9 UG/ML
WBC # BLD AUTO: 8.34 K/UL (ref 3.9–12.7)
WBC #/AREA STL HPF: NORMAL /[HPF]

## 2020-06-09 PROCEDURE — 87045 FECES CULTURE AEROBIC BACT: CPT

## 2020-06-09 PROCEDURE — 87209 SMEAR COMPLEX STAIN: CPT

## 2020-06-09 PROCEDURE — 87324 CLOSTRIDIUM AG IA: CPT

## 2020-06-09 PROCEDURE — 36415 COLL VENOUS BLD VENIPUNCTURE: CPT

## 2020-06-09 PROCEDURE — 80202 ASSAY OF VANCOMYCIN: CPT

## 2020-06-09 PROCEDURE — 87493 C DIFF AMPLIFIED PROBE: CPT

## 2020-06-09 PROCEDURE — 85025 COMPLETE CBC W/AUTO DIFF WBC: CPT

## 2020-06-09 PROCEDURE — 11000001 HC ACUTE MED/SURG PRIVATE ROOM

## 2020-06-09 PROCEDURE — 82010 KETONE BODYS QUAN: CPT

## 2020-06-09 PROCEDURE — 87449 NOS EACH ORGANISM AG IA: CPT

## 2020-06-09 PROCEDURE — 63600175 PHARM REV CODE 636 W HCPCS: Performed by: STUDENT IN AN ORGANIZED HEALTH CARE EDUCATION/TRAINING PROGRAM

## 2020-06-09 PROCEDURE — 94761 N-INVAS EAR/PLS OXIMETRY MLT: CPT

## 2020-06-09 PROCEDURE — 81001 URINALYSIS AUTO W/SCOPE: CPT

## 2020-06-09 PROCEDURE — S0030 INJECTION, METRONIDAZOLE: HCPCS | Performed by: STUDENT IN AN ORGANIZED HEALTH CARE EDUCATION/TRAINING PROGRAM

## 2020-06-09 PROCEDURE — 83735 ASSAY OF MAGNESIUM: CPT

## 2020-06-09 PROCEDURE — 87046 STOOL CULTR AEROBIC BACT EA: CPT | Mod: 59

## 2020-06-09 PROCEDURE — 83540 ASSAY OF IRON: CPT

## 2020-06-09 PROCEDURE — 25000003 PHARM REV CODE 250: Performed by: STUDENT IN AN ORGANIZED HEALTH CARE EDUCATION/TRAINING PROGRAM

## 2020-06-09 PROCEDURE — 89055 LEUKOCYTE ASSESSMENT FECAL: CPT

## 2020-06-09 PROCEDURE — 85045 AUTOMATED RETICULOCYTE COUNT: CPT

## 2020-06-09 PROCEDURE — 80053 COMPREHEN METABOLIC PANEL: CPT

## 2020-06-09 PROCEDURE — 87427 SHIGA-LIKE TOXIN AG IA: CPT

## 2020-06-09 PROCEDURE — 63600175 PHARM REV CODE 636 W HCPCS: Performed by: INTERNAL MEDICINE

## 2020-06-09 PROCEDURE — 99223 1ST HOSP IP/OBS HIGH 75: CPT | Mod: ,,, | Performed by: INTERNAL MEDICINE

## 2020-06-09 PROCEDURE — 99223 PR INITIAL HOSPITAL CARE,LEVL III: ICD-10-PCS | Mod: ,,, | Performed by: INTERNAL MEDICINE

## 2020-06-09 PROCEDURE — 84100 ASSAY OF PHOSPHORUS: CPT

## 2020-06-09 PROCEDURE — C9399 UNCLASSIFIED DRUGS OR BIOLOG: HCPCS | Performed by: STUDENT IN AN ORGANIZED HEALTH CARE EDUCATION/TRAINING PROGRAM

## 2020-06-09 RX ORDER — CEFEPIME HYDROCHLORIDE 2 G/1
2 INJECTION, POWDER, FOR SOLUTION INTRAVENOUS
Status: DISCONTINUED | OUTPATIENT
Start: 2020-06-09 | End: 2020-06-10

## 2020-06-09 RX ADMIN — INSULIN DETEMIR 25 UNITS: 100 INJECTION, SOLUTION SUBCUTANEOUS at 09:06

## 2020-06-09 RX ADMIN — INSULIN ASPART 5 UNITS: 100 INJECTION, SOLUTION INTRAVENOUS; SUBCUTANEOUS at 12:06

## 2020-06-09 RX ADMIN — SPIRONOLACTONE 25 MG: 25 TABLET, FILM COATED ORAL at 08:06

## 2020-06-09 RX ADMIN — INSULIN ASPART 4 UNITS: 100 INJECTION, SOLUTION INTRAVENOUS; SUBCUTANEOUS at 05:06

## 2020-06-09 RX ADMIN — INSULIN ASPART 5 UNITS: 100 INJECTION, SOLUTION INTRAVENOUS; SUBCUTANEOUS at 05:06

## 2020-06-09 RX ADMIN — ESTRADIOL 1 MG: 1 TABLET ORAL at 08:06

## 2020-06-09 RX ADMIN — METRONIDAZOLE 500 MG: 500 INJECTION, SOLUTION INTRAVENOUS at 09:06

## 2020-06-09 RX ADMIN — CEFTRIAXONE SODIUM 1 G: 1 INJECTION, POWDER, FOR SOLUTION INTRAMUSCULAR; INTRAVENOUS at 08:06

## 2020-06-09 RX ADMIN — INSULIN ASPART 4 UNITS: 100 INJECTION, SOLUTION INTRAVENOUS; SUBCUTANEOUS at 12:06

## 2020-06-09 RX ADMIN — METRONIDAZOLE 500 MG: 500 INJECTION, SOLUTION INTRAVENOUS at 12:06

## 2020-06-09 RX ADMIN — CEFEPIME 2 G: 2 INJECTION, POWDER, FOR SOLUTION INTRAVENOUS at 09:06

## 2020-06-09 RX ADMIN — METRONIDAZOLE 500 MG: 500 INJECTION, SOLUTION INTRAVENOUS at 04:06

## 2020-06-09 RX ADMIN — Medication 1250 MG: at 06:06

## 2020-06-09 RX ADMIN — INSULIN ASPART 3 UNITS: 100 INJECTION, SOLUTION INTRAVENOUS; SUBCUTANEOUS at 09:06

## 2020-06-09 RX ADMIN — INSULIN ASPART 5 UNITS: 100 INJECTION, SOLUTION INTRAVENOUS; SUBCUTANEOUS at 08:06

## 2020-06-09 RX ADMIN — INSULIN ASPART 3 UNITS: 100 INJECTION, SOLUTION INTRAVENOUS; SUBCUTANEOUS at 08:06

## 2020-06-09 NOTE — H&P
[facility]  Hospital Medicine  History & Physical    Patient Name: Pratik Torres  MRN: [id number]  Admission Date: 6/7/2020  Attending Physician: Flower Saldivar MD   Primary Care Provider: Felipe Summers MD         Patient information was obtained from patient, past medical records and ER records.     Subjective:     Principal Problem:Gluteal abscess    Chief Complaint:   Chief Complaint   Patient presents with    Abscess     right buttock was seen at [facility] today left AMA on medsurg waiting on surgical intervention        HPI: Odessa Torres is a 24-year-old MTF transgender woman with PMH DM I, IBS, previous gluteal abscesses who presents with subjective and objective fevers and gluteal abscess for several days. She reports the abscess on her right buttock is tender only to palpation. She has also been experiencing episodes of involuntary diarrhea, which can occur as many as ten times per day,  by days without bowel movements, though she reports this has been ongoing for the past three years since she was diagnosed with IBS. She denies chest pain, shortness of breath, nausea, vomiting, myalgias, cough.     In March of 2020, she had an abscess on her left inner buttock and left medial thigh. Cultures were positive for MRSA, e coli, and prevotella. She was discharged with a PICC line and completed course of vancomycin (3/2 - 3/27, was supposed to complete on 3/29 but discontinued 2 days early due to THEE), metronidazole 500 mg q8 (3/8 - 3/14), and ceftriaxone 2 g q24 (3/7 - 3/13). KEL in March of 2020 negative for vegetations.     She was diagnosed with COVID-19 in April of 2020, at which point she was also treated with ceftriaxone for five days for klebsiella UTI.     She was recently admitted to [facility] in [place], where she presented with gluteal abscess and fever (101.9 in ED), during which she was admitted to the ICU for DKA and sepsis presumed to  "be secondary to gluteal abscess. At that time, she received vancomycin, piperacillin-tazobactam, and clindamycin in the ED, and was later switched to vancomycin and ceftriaxone. Blood cultures drawn on 6/2 with no growth x5 days. Wound cultures not obtained. HIV and HCV negative on 6/2/2020. Patient was evaluated by surgical team, who recommended further debridement in the OR, however, there was concern regarding positive COVID status. Per patient, left AMA due to dissatisfaction with her hospital experience. Per patient, was repeatedly referred to as "Ms. Saxena," rather than "Odessa," felt like people were talking about her disrespectfully in the hallways outside of her room, and was not given food for a prolonged period of time while waiting to undergo surgery, which had to be rescheduled. She then represented to the ED at [facility] for continued fevers.     Odessa does not smoke cigarettes, she drinks alcohol only at parties at most several days out of the month, has consumed edible marijuana, has used black marva, denies cocaine, heroin, or any IV drug use. She lived at home with her family in [place], though reports yesterday she became homeless as her family kicked her out. 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 She identifies as female (pronouns she, her), has oral and anal sex with men, uses condoms consistently, has a history of treated syphilis, and denies history of any other STIs, and reports no sexual activity in the past four months. She has received PrEP previously. She follows at [facility] for her estradiol and spironolactone. She takes glargine 40 units once daily and aspart 10 TID with meals. Denies allergies to medications, though shrimp makes her throat close up.     Upon admission, afebrile, HR 90, -147 systolic (one-time BP of 105/50), SpO2 100% RA. Hemoglobin stable at 9, creatinine 1, anion gap 6, beta-hydroxybutyrate 0.2, . Elevated inflammatory markers. COVID positive " day prior.     Past Medical History:   Diagnosis Date    Diabetes mellitus        Past Surgical History:   Procedure Laterality Date    COLONOSCOPY      TOE SURGERY         Review of patient's allergies indicates:   Allergen Reactions    Shrimp        No current facility-administered medications on file prior to encounter.      Current Outpatient Medications on File Prior to Encounter   Medication Sig    albuterol (PROVENTIL/VENTOLIN HFA) 90 mcg/actuation inhaler Inhale 1-2 puffs into the lungs every 6 (six) hours as needed for Wheezing. Rescue    estradioL (ESTRACE) 1 MG tablet Take 1 mg by mouth once daily.    insulin aspart U-100 (NOVOLOG) 100 unit/mL injection Inject 10 Units into the skin 3 (three) times daily before meals.    insulin glargine (LANTUS SOLOSTAR) 100 unit/mL (3 mL) InPn pen Inject 15 Units into the skin every evening. (Patient taking differently: Inject 40 Units into the skin every evening. )    loperamide (IMODIUM) 2 mg capsule Take 2 mg by mouth 2 (two) times a day.    spironolactone (ALDACTONE) 25 MG tablet Take 25 mg by mouth once daily.    [DISCONTINUED] insulin lispro (HUMALOG) 100 unit/mL injection Inject into the skin 3 (three) times daily before meals.     Family History     None        Tobacco Use    Smoking status: Never Smoker    Smokeless tobacco: Never Used   Substance and Sexual Activity    Alcohol use: Yes     Comment: occasionally    Drug use: Not Currently    Sexual activity: Not on file     Review of Systems   Constitutional: Positive for fever. Negative for activity change and chills.   Eyes: Negative for photophobia and visual disturbance.   Respiratory: Negative for cough, chest tightness and shortness of breath.    Cardiovascular: Negative for chest pain and leg swelling.   Gastrointestinal: Positive for diarrhea. Negative for abdominal pain, blood in stool, constipation, nausea and vomiting.        Perirectal pain   Endocrine: Negative for polydipsia and  polyuria.   Genitourinary: Negative for difficulty urinating and dysuria.   Musculoskeletal: Negative for gait problem and myalgias.   Neurological: Negative for syncope and headaches.   Psychiatric/Behavioral: Negative for agitation and confusion.     Objective:     Vital Signs (Most Recent):  Temp: 97 °F (36.1 °C) (06/08/20 2004)  Pulse: 98 (06/08/20 2004)  Resp: 18 (06/08/20 2004)  BP: 113/65 (06/08/20 2004)  SpO2: 99 % (06/08/20 2004) Vital Signs (24h Range):  Temp:  [97 °F (36.1 °C)-98.6 °F (37 °C)] 97 °F (36.1 °C)  Pulse:  [] 98  Resp:  [12-18] 18  SpO2:  [99 %-100 %] 99 %  BP: (105-147)/(50-85) 113/65     Weight: 83.9 kg (185 lb)  Body mass index is 25.09 kg/m².    Physical Exam   Constitutional: He appears well-developed and well-nourished. No distress.   Lying on abdomen   HENT:   Head: Normocephalic and atraumatic.   Eyes: EOM are normal. No scleral icterus.   Neck: Normal range of motion. Neck supple.   Cardiovascular: Normal rate, regular rhythm and intact distal pulses.   No murmur heard.  Pulmonary/Chest: Effort normal. No stridor. No respiratory distress. He has no wheezes.   Abdominal: Soft. Bowel sounds are normal. He exhibits no distension. There is no tenderness.   Genitourinary:   Genitourinary Comments: ~6 cm open wound right buttock with brown foul-smelling drainage    Musculoskeletal: He exhibits no edema or tenderness.   Neurological: He is alert.   oriented   Skin: Skin is warm and dry. He is not diaphoretic.         CRANIAL NERVES     CN III, IV, VI   Extraocular motions are normal.       Significant Labs: All pertinent labs within the past 24 hours have been reviewed.    Significant Imaging: I have reviewed and interpreted all pertinent imaging results/findings within the past 24 hours.    Assessment/Plan:     * Gluteal abscess  25yo MTF transgender woman with PMH DM I, IBS, previous gluteal abscesses who presents with subjective and objective fevers and gluteal abscess for several  days. She has also been experiencing episodes of involuntary diarrhea, which can occur as many as ten times per day,  by days without bowel movements, though she reports this has been ongoing for the past three years since she was diagnosed with IBS. In March of 2020, she had an abscess on her left inner buttock and left medial thigh. Cultures were positive for MRSA, e coli, and prevotella. She was discharged with a PICC line and completed course of vancomycin (3/2 - 3/27, was supposed to complete on 3/29 but discontinued 2 days early due to THEE), metronidazole 500 mg q8 (3/8 - 3/14), and ceftriaxone 2 g q24 (3/7 - 3/13). KEL in March of 2020 negative for vegetations. Recent admission to [facility] with gluteal abscess and fever. At that time, she received vancomycin, piperacillin-tazobactam, and clindamycin in the ED, and was later switched to vancomycin and ceftriaxone. Blood cultures drawn on 6/2 with no growth x5 days. Wound cultures not obtained. HIV and HCV negative on 6/2/2020. Upon current admission, afebrile, HR 90, -147 systolic (one-time BP of 105/50), SpO2 100% RA. Lactic acid on 6.7 was 1.7. CT pelvis showed 7 cm right perirectal abscess communicating with the skin surface. There is less of a concern for sepsis currently. Etiology of repeated gluteal abscesses could be related to diarrhea as well as repeated receptive anal sex.     - follow-up blood cultures  - follow-up wound cultures  - IV vancomycin, ceftriaxone, and metronidazole  - general surgery consulted and recommend CRS consult  - CRS consulted with plans for OR on 6/10  - NPO midnight on 6/9  - encouragement of anal sex hygiene    COVID-19 virus detected  SpO2 100% on RA.   - COVID-19 testing Collection Date: 6/7/2020 Collection Time:  11:33 AM  - Infection Control notified      - Isolation:   - Airborne, Contact and Droplet Precautions  - Cohort patients into COVID units  - N95 masks must be fit tested, wear eye protection  - 20  second hand hygiene              - Limit visitors per hospital policy              - Consolidating lab draws, nursing care, provider visits, and interventions    - Diagnostics: (leukopenia, hyponatremia, hyperferritinemia, elevated troponin, elevated d-dimer, age, and comorbidities are significant predictors of poor clinical outcome)  CBC and CMP    - Management:  Supplemental O2 to maintain SpO2 >92%  Continuous/intermittent Pulse Ox  Albuterol treatment PRN    Type 1 diabetes mellitus with hyperglycemia  Takes glargine 40 units daily and aspart 10 TID with meals. Recent admission to [facility] ICU for DKA. Upon this admission, beta-hydroxybutyrate normal without anion gap. Blood sugar 247.  - diabetic diet  - POCT BG ACHS  - goal -180 while inpatient  - insulin detemir 20 units QHS  - aspart 5 TID with meals  - titrate insulin as appropriate    Gender Transition  Follows at [facility].  - continue home estradiol 1 mg PO daily  - continue home spironolactone 25 mg PO daily    Anemia  Hemoglobin stable at 9 (12.2 in November of 2019). No active signs of bleeding. Normal pulse and normotensive.  - follow-up iron studies     VTE Risk Mitigation (From admission, onward)         Ordered     enoxaparin injection 40 mg  Daily      06/08/20 1907     Place sequential compression device  Until discontinued      06/08/20 0719     IP VTE HIGH RISK PATIENT  Once      06/08/20 0658              Code status: full  Diet: diabetic  DVT prophylaxis: enoxaparin 40 mg PO daily  Disposition: OR for perirectal abscess debridement and IV antibiotics as well as clinical improvement    Esther Mc MD  Department of Hospital Medicine   [facility]

## 2020-06-09 NOTE — ASSESSMENT & PLAN NOTE
Hemoglobin stable at 9 (12.2 in November of 2019). No active signs of bleeding. Normal pulse and normotensive. Iron studies consistent with iron deficiency anemia.  - consider iron supplementation upon discharge

## 2020-06-09 NOTE — ASSESSMENT & PLAN NOTE
SpO2 100% on RA.   - COVID-19 testing Collection Date: 6/7/2020 Collection Time:  11:33 AM  - Infection Control notified      - Isolation:   - Airborne, Contact and Droplet Precautions  - Cohort patients into COVID units  - N95 masks must be fit tested, wear eye protection  - 20 second hand hygiene              - Limit visitors per hospital policy              - Consolidating lab draws, nursing care, provider visits, and interventions    - Diagnostics: (leukopenia, hyponatremia, hyperferritinemia, elevated troponin, elevated d-dimer, age, and comorbidities are significant predictors of poor clinical outcome)  CBC and CMP    - Management:  Supplemental O2 to maintain SpO2 >92%  Continuous/intermittent Pulse Ox  Albuterol treatment PRN

## 2020-06-09 NOTE — PLAN OF CARE
Problem: Adult Inpatient Plan of Care  Goal: Plan of Care Review  Outcome: Ongoing, Progressing   Patient turned and repositioned self independently, utilizing prone position. Continues with right gluteal wound, dressing intact. Patient pain and safety monitored q 1-2 hrs this shift. Blood glucose monitored throughout shift. BG @HS 406ml/dL, MD notified and given ordered sliding scale dose. Rechecked with slight decrease noted. Ambulates independently without difficulty. Bed locked and in lowest position. Bed side rails elevated x2. Call light and personal belongings in reach. Continues on ABT, no a/e noted. Will cont. to monitor.

## 2020-06-09 NOTE — ASSESSMENT & PLAN NOTE
25yo MTF transgender woman with PMH DM I, IBS, previous gluteal abscesses who presents with subjective and objective fevers and gluteal abscess for several days. She has also been experiencing episodes of involuntary diarrhea, which can occur as many as ten times per day,  by days without bowel movements, though she reports this has been ongoing for the past three years since she was diagnosed with IBS. In March of 2020, she had an abscess on her left inner buttock and left medial thigh. Cultures were positive for MRSA, e coli, and prevotella. She was discharged with a PICC line and completed course of vancomycin (3/2 - 3/27, was supposed to complete on 3/29 but discontinued 2 days early due to THEE), metronidazole 500 mg q8 (3/8 - 3/14), and ceftriaxone 2 g q24 (3/7 - 3/13). KEL in March of 2020 negative for vegetations. Recent admission to [facility] with gluteal abscess and fever. At that time, she received vancomycin, piperacillin-tazobactam, and clindamycin in the ED, and was later switched to vancomycin and ceftriaxone. Blood cultures drawn on 6/2 with no growth x5 days. Wound cultures not obtained. HIV and HCV negative on 6/2/2020. Upon current admission, afebrile, HR 90, -147 systolic (one-time BP of 105/50), SpO2 100% RA. Lactic acid on 6.7 was 1.7. There is less of a concern for sepsis currently. Etiology of repeated gluteal abscesses could be related to diarrhea as well as repeated receptive anal sex.     - follow-up blood cultures  - follow-up wound cultures  - IV vancomycin, ceftriaxone, and metronidazole  - general surgery consulted and recommend CRS consult  - CRS consulted with plans for OR on 6/10  - NPO midnight on 6/9  - encouragement of anal sex hygiene

## 2020-06-09 NOTE — SUBJECTIVE & OBJECTIVE
Interval History: Odessa denies any changes in her symptoms today. BG elevated.     Review of Systems   Constitutional: Negative for activity change, chills and fever.   Eyes: Negative for photophobia and visual disturbance.   Respiratory: Negative for cough, chest tightness and shortness of breath.    Cardiovascular: Negative for chest pain and leg swelling.   Gastrointestinal: Positive for diarrhea. Negative for abdominal pain, blood in stool, constipation, nausea and vomiting.        Perirectal pain   Endocrine: Negative for polydipsia and polyuria.   Genitourinary: Negative for difficulty urinating and dysuria.   Musculoskeletal: Negative for gait problem and myalgias.   Neurological: Negative for syncope and headaches.   Psychiatric/Behavioral: Negative for agitation and confusion.     Objective:     Vital Signs (Most Recent):  Temp: 98.1 °F (36.7 °C) (06/09/20 1532)  Pulse: 90 (06/09/20 1532)  Resp: 18 (06/09/20 1532)  BP: 120/65 (06/09/20 1532)  SpO2: 100 % (06/09/20 1307) Vital Signs (24h Range):  Temp:  [97 °F (36.1 °C)-98.2 °F (36.8 °C)] 98.1 °F (36.7 °C)  Pulse:  [72-98] 90  Resp:  [18-20] 18  SpO2:  [97 %-100 %] 100 %  BP: (113-120)/(65-67) 120/65     Weight: 83.9 kg (185 lb)  Body mass index is 25.09 kg/m².    Intake/Output Summary (Last 24 hours) at 6/9/2020 1612  Last data filed at 6/9/2020 0900  Gross per 24 hour   Intake 240 ml   Output --   Net 240 ml      Physical Exam   Constitutional: He appears well-developed and well-nourished. No distress.   Lying on abdomen   HENT:   Head: Normocephalic and atraumatic.   Eyes: EOM are normal. No scleral icterus.   Neck: Normal range of motion. Neck supple.   Cardiovascular: Normal rate, regular rhythm and intact distal pulses.   No murmur heard.  Pulmonary/Chest: Effort normal. No stridor. No respiratory distress. He has no wheezes.   Abdominal: Soft. Bowel sounds are normal. He exhibits no distension. There is no tenderness.   Genitourinary:   Genitourinary  Comments: ~6 cm open wound right buttock with brown foul-smelling drainage    Musculoskeletal: He exhibits no edema or tenderness.   Neurological: He is alert.   oriented   Skin: Skin is warm and dry. He is not diaphoretic.     Significant Labs: All pertinent labs within the past 24 hours have been reviewed.    Significant Imaging: I have reviewed and interpreted all pertinent imaging results/findings within the past 24 hours.

## 2020-06-09 NOTE — ASSESSMENT & PLAN NOTE
Clostridium difficile infection  Reports days with 10 episodes of involuntary loose stools as well as days without any bowel movements. C diff positive.  - PO vancomycin

## 2020-06-09 NOTE — PROGRESS NOTES
[facility]  Hospital Medicine  Progress Note    Patient Name: Pratik Torres  MRN: [id number]  Patient Class: IP- Inpatient   Admission Date: 6/7/2020  Length of Stay: 1 days  Attending Physician: Flower Saldivar MD  Primary Care Provider: Felipe Summers MD        Subjective:     Principal Problem:Gluteal abscess        HPI:  Odessa Torres is a 24-year-old MTF transgender woman with PMH DM I, IBS, previous gluteal abscesses who presents with subjective and objective fevers and gluteal abscess for several days. She reports the abscess on her right buttock is tender only to palpation. She has also been experiencing episodes of involuntary diarrhea, which can occur as many as ten times per day,  by days without bowel movements, though she reports this has been ongoing for the past three years since she was diagnosed with IBS. She denies chest pain, shortness of breath, nausea, vomiting, myalgias, cough.     In March of 2020, she had an abscess on her left inner buttock and left medial thigh. Cultures were positive for MRSA, e coli, and prevotella. She was discharged with a PICC line and completed course of vancomycin (3/2 - 3/27, was supposed to complete on 3/29 but discontinued 2 days early due to THEE), metronidazole 500 mg q8 (3/8 - 3/14), and ceftriaxone 2 g q24 (3/7 - 3/13). KEL in March of 2020 negative for vegetations.     She was diagnosed with COVID-19 in April of 2020, at which point she was also treated with ceftriaxone for five days for klebsiella UTI.     She was recently admitted to [facility] in [place], where she presented with gluteal abscess and fever (101.9 in ED), during which she was admitted to the ICU for DKA and sepsis presumed to be secondary to gluteal abscess. At that time, she received vancomycin, piperacillin-tazobactam, and clindamycin in the ED, and was later switched to vancomycin and ceftriaxone. Blood cultures drawn on 6/2  "with no growth x5 days. Wound cultures not obtained. HIV and HCV negative on 6/2/2020. Patient was evaluated by surgical team, who recommended further debridement in the OR, however, there was concern regarding positive COVID status. Per patient, left AMA due to dissatisfaction with her hospital experience. Per patient, was repeatedly referred to as "Ms. Saxena," rather than "Odessa," felt like people were talking about her disrespectfully in the hallways outside of her room, and was not given food for a prolonged period of time while waiting to undergo surgery, which had to be rescheduled. She then represented to the ED at [facility] for continued fevers.     Odessa does not smoke cigarettes, she drinks alcohol only at parties at most several days out of the month, has consumed edible marijuana, has used black marva, denies cocaine, heroin, or any IV drug use. She lived at home with her family in [facility], though reports yesterday she became homeless as her family kicked her out. 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 She identifies as female (pronouns she, her), has oral and anal sex with men, uses condoms consistently, has a history of treated syphilis, and denies history of any other STIs, and reports no sexual activity in the past four months. She has received PrEP previously. She follows at [facility] for her estradiol and spironolactone. She takes glargine 40 units once daily and aspart 10 TID with meals. Denies allergies to medications, though shrimp makes her throat close up.     Upon admission, afebrile, HR 90, -147 systolic (one-time BP of 105/50), SpO2 100% RA. Hemoglobin stable at 9, creatinine 1, anion gap 6, beta-hydroxybutyrate 0.2, . Elevated inflammatory markers. COVID positive day prior.     Overview/Hospital Course:  Admitted to hospital medicine on 6/8 for perirectal abscess associated with fevers. Started on IV vancomycin, ceftriaxone, and metronidazole. CT abdomen/pelvis on " 6/8 showed 7 cm right perirectal abscess communicating with the skin surface. General surgery consulted and recommended colorectal surgery consult. CRS plan to take to OR on 6/10. Started basal-bolus insulin regimen and continued home estradiol and spironolactone. Ceftriaxone switched to cefepime given history of diabetes and desire to provide pseudomonal coverage.     Interval History: Odessa denies any changes in her symptoms today. BG elevated.     Review of Systems   Constitutional: Negative for activity change, chills and fever.   Eyes: Negative for photophobia and visual disturbance.   Respiratory: Negative for cough, chest tightness and shortness of breath.    Cardiovascular: Negative for chest pain and leg swelling.   Gastrointestinal: Positive for diarrhea. Negative for abdominal pain, blood in stool, constipation, nausea and vomiting.        Perirectal pain   Endocrine: Negative for polydipsia and polyuria.   Genitourinary: Negative for difficulty urinating and dysuria.   Musculoskeletal: Negative for gait problem and myalgias.   Neurological: Negative for syncope and headaches.   Psychiatric/Behavioral: Negative for agitation and confusion.     Objective:     Vital Signs (Most Recent):  Temp: 98.1 °F (36.7 °C) (06/09/20 1532)  Pulse: 90 (06/09/20 1532)  Resp: 18 (06/09/20 1532)  BP: 120/65 (06/09/20 1532)  SpO2: 100 % (06/09/20 1307) Vital Signs (24h Range):  Temp:  [97 °F (36.1 °C)-98.2 °F (36.8 °C)] 98.1 °F (36.7 °C)  Pulse:  [72-98] 90  Resp:  [18-20] 18  SpO2:  [97 %-100 %] 100 %  BP: (113-120)/(65-67) 120/65     Weight: 83.9 kg (185 lb)  Body mass index is 25.09 kg/m².    Intake/Output Summary (Last 24 hours) at 6/9/2020 1612  Last data filed at 6/9/2020 0900  Gross per 24 hour   Intake 240 ml   Output --   Net 240 ml      Physical Exam   Constitutional: He appears well-developed and well-nourished. No distress.   Lying on abdomen   HENT:   Head: Normocephalic and atraumatic.   Eyes: EOM are normal.  No scleral icterus.   Neck: Normal range of motion. Neck supple.   Cardiovascular: Normal rate, regular rhythm and intact distal pulses.   No murmur heard.  Pulmonary/Chest: Effort normal. No stridor. No respiratory distress. He has no wheezes.   Abdominal: Soft. Bowel sounds are normal. He exhibits no distension. There is no tenderness.   Genitourinary:   Genitourinary Comments: ~6 cm open wound right buttock with brown foul-smelling drainage    Musculoskeletal: He exhibits no edema or tenderness.   Neurological: He is alert.   oriented   Skin: Skin is warm and dry. He is not diaphoretic.     Significant Labs: All pertinent labs within the past 24 hours have been reviewed.    Significant Imaging: I have reviewed and interpreted all pertinent imaging results/findings within the past 24 hours.      Assessment/Plan:      * Gluteal abscess  25yo MTF transgender woman with PMH DM I, IBS, previous gluteal abscesses who presents with subjective and objective fevers and gluteal abscess for several days. She has also been experiencing episodes of involuntary diarrhea, which can occur as many as ten times per day,  by days without bowel movements, though she reports this has been ongoing for the past three years since she was diagnosed with IBS. In March of 2020, she had an abscess on her left inner buttock and left medial thigh. Cultures were positive for MRSA, e coli, and prevotella. She was discharged with a PICC line and completed course of vancomycin (3/2 - 3/27, was supposed to complete on 3/29 but discontinued 2 days early due to THEE), metronidazole 500 mg q8 (3/8 - 3/14), and ceftriaxone 2 g q24 (3/7 - 3/13). KEL in March of 2020 negative for vegetations. Recent admission to [facility] with gluteal abscess and fever. At that time, she received vancomycin, piperacillin-tazobactam, and clindamycin in the ED, and was later switched to vancomycin and ceftriaxone. Blood cultures drawn on 6/2 with no growth x5 days.  Wound cultures not obtained. HIV and HCV negative on 6/2/2020. Upon current admission, afebrile, HR 90, -147 systolic (one-time BP of 105/50), SpO2 100% RA. Lactic acid on 6.7 was 1.7. There is less of a concern for sepsis currently. Etiology of repeated gluteal abscesses could be related to diarrhea as well as repeated receptive anal sex.     - follow-up blood cultures  - follow-up wound cultures  - wound care  - IV vancomycin, cefepime, and metronidazole  - general surgery consulted and recommend CRS consult  - CRS consulted with plans for OR on 6/10  - NPO midnight on 6/9  - encouragement of anal sex hygiene    COVID-19 virus detected  SpO2 100% on RA.   - COVID-19 testing Collection Date: 6/7/2020 Collection Time:  11:33 AM  - Infection Control notified      - Isolation:   - Airborne, Contact and Droplet Precautions  - Cohort patients into COVID units  - N95 masks must be fit tested, wear eye protection  - 20 second hand hygiene              - Limit visitors per hospital policy              - Consolidating lab draws, nursing care, provider visits, and interventions    - Diagnostics: (leukopenia, hyponatremia, hyperferritinemia, elevated troponin, elevated d-dimer, age, and comorbidities are significant predictors of poor clinical outcome)  CBC and CMP    - Management:  Supplemental O2 to maintain SpO2 >92%  Continuous/intermittent Pulse Ox  Albuterol treatment PRN    Type 1 diabetes mellitus with hyperglycemia  Takes glargine 40 units daily and aspart 10 TID with meals. Recent admission to [facility] ICU for DKA. Upon this admission, beta-hydroxybutyrate normal without anion gap. Blood sugar 247.  - diabetic diet  - POCT BG ACHS  - goal -180 while inpatient  - insulin detemir 25 units QHS  - aspart 5 TID with meals  - titrate insulin as appropriate    Diarrhea  Reports days with 10 involuntary loose stools as well as days without any bowel movements.  - follow-up stool studies    Anemia  Hemoglobin stable  at 9 (12.2 in November of 2019). No active signs of bleeding. Normal pulse and normotensive. Iron studies consistent with iron deficiency anemia.  - consider iron supplementation upon discharge    VTE Risk Mitigation (From admission, onward)         Ordered     Place sequential compression device  Until discontinued      06/08/20 0719     IP VTE HIGH RISK PATIENT  Once      06/08/20 0658              Code status: full  DVT prophylaxis: SCDs in anticipation of surgery tomorrow   Diet: diabetic; NPO at midnight  Disposition: OR debridement and IV antibiotics, clinical improvement    Esther Mc MD  Department of Hospital Medicine   [facility]

## 2020-06-09 NOTE — ANESTHESIA PREPROCEDURE EVALUATION
[facility]  Anesthesia Pre-Operative Evaluation         Patient Name: Pratik Torres  YOB: 1995  MRN: [id number]    SUBJECTIVE:     Pre-operative evaluation for Procedure(s) (LRB):  INCISION, ABSCESS, PERIRECTAL (N/A)     06/09/2020    Pratik Torres is a 24 y.o. MTF transgender woman w/ a significant PMHx of poorly-controlled DM1, IBS, and recurrent perianal abscesses. In March of 2020, she had an abscess on her left inner buttock and left medial thigh. Cultures were positive for MRSA, e coli, and prevotella. She was discharged with a PICC line and completed course of vancomycin (3/2 - 3/27, was supposed to complete on 3/29 but discontinued 2 days early due to THEE), metronidazole 500 mg q8 (3/8 - 3/14), and ceftriaxone 2 g q24 (3/7 - 3/13). KEL in March of 2020 negative for vegetations.     Diagnosed with COVID-19 April 2020. Recently admitted to [facility] with fever and DKA, eventually left A due to reported disrespectful treatment by staff. Represented to [facility] for continued fevers.    Patient now presents for the above procedure(s).      LDA: 20g PIV    Prev airway: ETT by DL (Mac 3.5), grade 1 view, no complications noted at OSH    Drips: None documented.      Patient Active Problem List   Diagnosis    Acute pancreatitis without infection or necrosis    Type 1 diabetes mellitus with hyperglycemia    Diabetic ketoacidosis without coma associated with type 1 diabetes mellitus    Cellulitis and abscess of buttock    Elevated procalcitonin    Diarrhea    Fever    Gluteal abscess    COVID-19 virus detected    Anemia       Review of patient's allergies indicates:   Allergen Reactions    Shrimp        Current Inpatient Medications:   cefTRIAXone (ROCEPHIN) IVPB  1 g Intravenous Q24H    enoxaparin  40 mg Subcutaneous Daily    estradioL  1 mg Oral Daily    insulin aspart U-100  5 Units Subcutaneous TIDWM    insulin detemir U-100  20 Units Subcutaneous QHS    lidocaine HCL 10  mg/ml (1%)  20 mL Other Once    metronidazole  500 mg Intravenous Q8H    spironolactone  25 mg Oral Daily    vancomycin (VANCOCIN) IVPB  15 mg/kg Intravenous Q12H       No current facility-administered medications on file prior to encounter.      Current Outpatient Medications on File Prior to Encounter   Medication Sig Dispense Refill    albuterol (PROVENTIL/VENTOLIN HFA) 90 mcg/actuation inhaler Inhale 1-2 puffs into the lungs every 6 (six) hours as needed for Wheezing. Rescue 6.7 g 0    estradioL (ESTRACE) 1 MG tablet Take 1 mg by mouth once daily.      insulin aspart U-100 (NOVOLOG) 100 unit/mL injection Inject 10 Units into the skin 3 (three) times daily before meals.      insulin glargine (LANTUS SOLOSTAR) 100 unit/mL (3 mL) InPn pen Inject 15 Units into the skin every evening. (Patient taking differently: Inject 40 Units into the skin every evening. ) 15 mL 3    loperamide (IMODIUM) 2 mg capsule Take 2 mg by mouth 2 (two) times a day.      spironolactone (ALDACTONE) 25 MG tablet Take 25 mg by mouth once daily.         Past Surgical History:   Procedure Laterality Date    COLONOSCOPY      TOE SURGERY         Social History     Socioeconomic History    Marital status: Single     Spouse name: Not on file    Number of children: Not on file    Years of education: Not on file    Highest education level: Not on file   Occupational History    Not on file   Social Needs    Financial resource strain: Not on file    Food insecurity:     Worry: Not on file     Inability: Not on file    Transportation needs:     Medical: Not on file     Non-medical: Not on file   Tobacco Use    Smoking status: Never Smoker    Smokeless tobacco: Never Used   Substance and Sexual Activity    Alcohol use: Yes     Comment: occasionally    Drug use: Not Currently    Sexual activity: Not on file   Lifestyle    Physical activity:     Days per week: Not on file     Minutes per session: Not on file    Stress: Not on file    Relationships    Social connections:     Talks on phone: Not on file     Gets together: Not on file     Attends Bahai service: Not on file     Active member of club or organization: Not on file     Attends meetings of clubs or organizations: Not on file     Relationship status: Not on file   Other Topics Concern    Not on file   Social History Narrative    Not on file       OBJECTIVE:     Vital Signs Range (Last 24H):  Temp:  [36.1 °C (97 °F)-36.8 °C (98.2 °F)]   Pulse:  []   Resp:  [12-20]   BP: (113-120)/(65-67)   SpO2:  [97 %-100 %]       Significant Labs:  Lab Results   Component Value Date    WBC 8.34 06/09/2020    HGB 8.9 (L) 06/09/2020    HCT 27.9 (L) 06/09/2020     (H) 06/09/2020    CHOL 110 03/26/2020    TRIG 92 03/26/2020    HDL 33 (L) 03/26/2020    ALT 6 (L) 06/09/2020    AST 10 06/09/2020     (L) 06/09/2020    K 3.9 06/09/2020    CL 99 06/09/2020    CREATININE 1.1 06/09/2020    BUN 9 06/09/2020    CO2 27 06/09/2020    HGBA1C 12.2 (H) 06/05/2020       Diagnostic Studies: No relevant studies.    EKG:   Results for orders placed or performed during the hospital encounter of 08/31/18   EKG 12-lead    Collection Time: 08/31/18  7:54 AM    Narrative    Test Reason : R73.9,  Blood Pressure : **/** mmHG  Vent. Rate : 084 BPM     Atrial Rate : 087 BPM     P-R Int : 160 ms          QRS Dur : 088 ms      QT Int : 344 ms       P-R-T Axes : 053 056 004 degrees     QTc Int : 407 ms    **Age and gender specific analysis**  Normal sinus rhythm  Nonspecific T wave abnormality  Abnormal ECG    Confirmed by Lj Fitzgerald MD ([id number]) on 9/1/2018 2:07:40 PM    Referred By: PAMELA   SELF           Confirmed By:Lj Fitzgerald MD       ECHOCARDIOGRAM:  TTE:  No results found for this or any previous visit.      ASSESSMENT/PLAN:           Anesthesia Evaluation    I have reviewed the Patient Summary Reports.         Review of Systems  Anesthesia Hx:  No problems with previous Anesthesia   History of prior surgery of interest to airway management or planning:  Denies Personal Hx of Anesthesia complications.   Social:  Non-Smoker    Cardiovascular:  Cardiovascular Normal     Pulmonary:  Pulmonary Normal    Renal/:  Renal/ Normal     Hepatic/GI:   IBS  Perianal abscess    Musculoskeletal:  Musculoskeletal Normal    Neurological:  Neurology Normal    Endocrine:   Diabetes, poorly controlled, type 1        Physical Exam  General:  Well nourished    Airway/Jaw/Neck:  Airway Findings: Mouth Opening: Normal Tongue: Normal  General Airway Assessment: Adult  TM Distance: Normal, at least 6 cm        Eyes/Ears/Nose:  EYES/EARS/NOSE FINDINGS: Normal   Dental:  DENTAL FINDINGS: Normal   Chest/Lungs:  Chest/Lungs Clear    Heart/Vascular:  Heart Findings: Normal       Mental Status:  Mental Status Findings:  Cooperative, Alert and Oriented         Anesthesia Plan  Type of Anesthesia, risks & benefits discussed:  Anesthesia Type:  general  Patient's Preference:   Intra-op Monitoring Plan: standard ASA monitors  Intra-op Monitoring Plan Comments:   Post Op Pain Control Plan: per primary service following discharge from PACU, IV/PO Opioids PRN and multimodal analgesia  Post Op Pain Control Plan Comments:   Induction:   IV  Beta Blocker:  Patient is not currently on a Beta-Blocker (No further documentation required).       Informed Consent: Patient understands risks and agrees with Anesthesia plan.  Questions answered. Anesthesia consent signed with patient.  ASA Score: 3     Day of Surgery Review of History & Physical:    H&P update referred to the surgeon.         Ready For Surgery From Anesthesia Perspective.

## 2020-06-09 NOTE — ASSESSMENT & PLAN NOTE
Takes glargine 40 units daily and aspart 10 TID with meals. Recent admission to [facility] ICU for DKA. Upon this admission, beta-hydroxybutyrate normal without anion gap. Blood sugar 247.  - diabetic diet  - POCT BG ACHS  - goal -180 while inpatient  - insulin detemir 20 units QHS  - aspart 5 TID with meals  - titrate insulin as appropriate

## 2020-06-09 NOTE — ASSESSMENT & PLAN NOTE
25yo MTF transgender woman with PMH DM I, IBS, previous gluteal abscesses who presents with subjective and objective fevers and gluteal abscess for several days. She has also been experiencing episodes of involuntary diarrhea, which can occur as many as ten times per day,  by days without bowel movements, though she reports this has been ongoing for the past three years since she was diagnosed with IBS. In March of 2020, she had an abscess on her left inner buttock and left medial thigh. Cultures were positive for MRSA, e coli, and prevotella. She was discharged with a PICC line and completed course of vancomycin (3/2 - 3/27, was supposed to complete on 3/29 but discontinued 2 days early due to THEE), metronidazole 500 mg q8 (3/8 - 3/14), and ceftriaxone 2 g q24 (3/7 - 3/13). KEL in March of 2020 negative for vegetations. Recent admission to [facility] with gluteal abscess and fever. At that time, she received vancomycin, piperacillin-tazobactam, and clindamycin in the ED, and was later switched to vancomycin and ceftriaxone. Blood cultures drawn on 6/2 with no growth x5 days. Wound cultures not obtained. HIV and HCV negative on 6/2/2020. Upon current admission, afebrile, HR 90, -147 systolic (one-time BP of 105/50), SpO2 100% RA. Lactic acid on 6.7 was 1.7. There is less of a concern for sepsis currently. Etiology of repeated gluteal abscesses could be related to diarrhea as well as repeated receptive anal sex.     - follow-up blood cultures  - follow-up wound cultures  - wound care  - IV vancomycin, cefepime, and metronidazole  - general surgery consulted and recommend CRS consult  - CRS consulted with plans for OR on 6/10  - NPO midnight on 6/9  - encouragement of anal sex hygiene

## 2020-06-09 NOTE — SUBJECTIVE & OBJECTIVE
Past Medical History:   Diagnosis Date    Diabetes mellitus        Past Surgical History:   Procedure Laterality Date    COLONOSCOPY      TOE SURGERY         Review of patient's allergies indicates:   Allergen Reactions    Shrimp        No current facility-administered medications on file prior to encounter.      Current Outpatient Medications on File Prior to Encounter   Medication Sig    albuterol (PROVENTIL/VENTOLIN HFA) 90 mcg/actuation inhaler Inhale 1-2 puffs into the lungs every 6 (six) hours as needed for Wheezing. Rescue    estradioL (ESTRACE) 1 MG tablet Take 1 mg by mouth once daily.    insulin aspart U-100 (NOVOLOG) 100 unit/mL injection Inject 10 Units into the skin 3 (three) times daily before meals.    insulin glargine (LANTUS SOLOSTAR) 100 unit/mL (3 mL) InPn pen Inject 15 Units into the skin every evening. (Patient taking differently: Inject 40 Units into the skin every evening. )    loperamide (IMODIUM) 2 mg capsule Take 2 mg by mouth 2 (two) times a day.    spironolactone (ALDACTONE) 25 MG tablet Take 25 mg by mouth once daily.    [DISCONTINUED] insulin lispro (HUMALOG) 100 unit/mL injection Inject into the skin 3 (three) times daily before meals.     Family History     None        Tobacco Use    Smoking status: Never Smoker    Smokeless tobacco: Never Used   Substance and Sexual Activity    Alcohol use: Yes     Comment: occasionally    Drug use: Not Currently    Sexual activity: Not on file     Review of Systems   Constitutional: Positive for fever. Negative for activity change and chills.   Eyes: Negative for photophobia and visual disturbance.   Respiratory: Negative for cough, chest tightness and shortness of breath.    Cardiovascular: Negative for chest pain and leg swelling.   Gastrointestinal: Positive for diarrhea. Negative for abdominal pain, blood in stool, constipation, nausea and vomiting.        Perirectal pain   Endocrine: Negative for polydipsia and polyuria.    Genitourinary: Negative for difficulty urinating and dysuria.   Musculoskeletal: Negative for gait problem and myalgias.   Neurological: Negative for syncope and headaches.   Psychiatric/Behavioral: Negative for agitation and confusion.     Objective:     Vital Signs (Most Recent):  Temp: 97 °F (36.1 °C) (06/08/20 2004)  Pulse: 98 (06/08/20 2004)  Resp: 18 (06/08/20 2004)  BP: 113/65 (06/08/20 2004)  SpO2: 99 % (06/08/20 2004) Vital Signs (24h Range):  Temp:  [97 °F (36.1 °C)-98.6 °F (37 °C)] 97 °F (36.1 °C)  Pulse:  [] 98  Resp:  [12-18] 18  SpO2:  [99 %-100 %] 99 %  BP: (105-147)/(50-85) 113/65     Weight: 83.9 kg (185 lb)  Body mass index is 25.09 kg/m².    Physical Exam   Constitutional: He appears well-developed and well-nourished. No distress.   Lying on abdomen   HENT:   Head: Normocephalic and atraumatic.   Eyes: EOM are normal. No scleral icterus.   Neck: Normal range of motion. Neck supple.   Cardiovascular: Normal rate, regular rhythm and intact distal pulses.   No murmur heard.  Pulmonary/Chest: Effort normal. No stridor. No respiratory distress. He has no wheezes.   Abdominal: Soft. Bowel sounds are normal. He exhibits no distension. There is no tenderness.   Genitourinary:   Genitourinary Comments: ~6 cm open wound right buttock with brown foul-smelling drainage    Musculoskeletal: He exhibits no edema or tenderness.   Neurological: He is alert.   oriented   Skin: Skin is warm and dry. He is not diaphoretic.         CRANIAL NERVES     CN III, IV, VI   Extraocular motions are normal.       Significant Labs: All pertinent labs within the past 24 hours have been reviewed.    Significant Imaging: I have reviewed and interpreted all pertinent imaging results/findings within the past 24 hours.

## 2020-06-09 NOTE — PROGRESS NOTES
"Patient did not want lovenox shot and stated wound was :"too sore" for wound care. Refused to allow wound care this evening. Patient informed of need for stool sample. Hat placed in bathroom for collection.     Printed information forwarded by Ariana , given to patient.   "

## 2020-06-09 NOTE — ASSESSMENT & PLAN NOTE
Hemoglobin stable at 9 (12.2 in November of 2019). No active signs of bleeding. Normal pulse and normotensive.  - follow-up iron studies

## 2020-06-09 NOTE — PLAN OF CARE
HIREN informed by JO-ANN Gan that pt is requesting a list of shelters.  HIREN emailed shelter list to ALEXANDREA Quintero to print out and give to pt.    Ariana Clark LMSW  [facility]  [phone]

## 2020-06-09 NOTE — PHARMACY MED REC
"Admission Medication Reconciliation - Pharmacy Consult Note    The home medication history was taken by Aggie Hu Pharmacy Tech.  Based on information gathered and subsequent review by the clinical pharmacist, the items below may need attention.    You may go to "Admission" then "Reconcile Home Medications" tabs to review and/or act upon these items.      No issues noted with the medication reconciliation.    Of note:  · Insulin glargine last filled 4/14/20   · Insulin aspart last filled 5/9/20  · Estradiol/spironolactone last filled 3/12/20  · Descovy (reports as not currently taking as not sexually active)        Potential issues to be addressed PRIOR TO DISCHARGE  o Prescriptions not filled prior to admission    Please address this information as you see fit.  Feel free to contact us if you have any questions or require assistance.  Stella Lane, PharmD  [id number]                  .    .            "

## 2020-06-09 NOTE — ASSESSMENT & PLAN NOTE
Takes glargine 40 units daily and aspart 10 TID with meals. Recent admission to [facility] ICU for DKA. Upon this admission, beta-hydroxybutyrate normal without anion gap. Blood sugar 247.  - diabetic diet  - POCT BG ACHS  - goal -180 while inpatient  - insulin detemir 25 units QHS  - aspart 5 TID with meals  - titrate insulin as appropriate

## 2020-06-10 ENCOUNTER — ANESTHESIA (OUTPATIENT)
Dept: SURGERY | Facility: HOSPITAL | Age: 25
DRG: 333 | End: 2020-06-10
Payer: MEDICAID

## 2020-06-10 PROBLEM — A49.8 CLOSTRIDIUM DIFFICILE INFECTION: Status: ACTIVE | Noted: 2020-06-10

## 2020-06-10 PROBLEM — K61.1 PERIRECTAL ABSCESS: Status: ACTIVE | Noted: 2020-06-10

## 2020-06-10 LAB
ALBUMIN SERPL BCP-MCNC: 2.3 G/DL (ref 3.5–5.2)
ALP SERPL-CCNC: 94 U/L (ref 55–135)
ALT SERPL W/O P-5'-P-CCNC: 6 U/L (ref 10–44)
ANION GAP SERPL CALC-SCNC: 6 MMOL/L (ref 8–16)
AST SERPL-CCNC: 9 U/L (ref 10–40)
B-OH-BUTYR BLD STRIP-SCNC: 0 MMOL/L (ref 0–0.5)
BACTERIA #/AREA URNS AUTO: ABNORMAL /HPF
BACTERIA SPEC AEROBE CULT: ABNORMAL
BACTERIA SPEC AEROBE CULT: ABNORMAL
BASOPHILS # BLD AUTO: 0.02 K/UL (ref 0–0.2)
BASOPHILS NFR BLD: 0.2 % (ref 0–1.9)
BILIRUB SERPL-MCNC: 0.2 MG/DL (ref 0.1–1)
BILIRUB UR QL STRIP: NEGATIVE
BUN SERPL-MCNC: 8 MG/DL (ref 6–20)
C DIFF GDH STL QL: POSITIVE
C DIFF TOX A+B STL QL IA: NEGATIVE
C DIFF TOX GENS STL QL NAA+PROBE: POSITIVE
CALCIUM SERPL-MCNC: 9.2 MG/DL (ref 8.7–10.5)
CHLORIDE SERPL-SCNC: 98 MMOL/L (ref 95–110)
CLARITY UR REFRACT.AUTO: ABNORMAL
CO2 SERPL-SCNC: 28 MMOL/L (ref 23–29)
COLOR UR AUTO: YELLOW
CREAT SERPL-MCNC: 1.1 MG/DL (ref 0.5–1.4)
CRP SERPL-MCNC: 46.4 MG/L (ref 0–8.2)
D DIMER PPP IA.FEU-MCNC: 2.86 MG/L FEU
DIFFERENTIAL METHOD: ABNORMAL
EOSINOPHIL # BLD AUTO: 0.2 K/UL (ref 0–0.5)
EOSINOPHIL NFR BLD: 2.1 % (ref 0–8)
ERYTHROCYTE [DISTWIDTH] IN BLOOD BY AUTOMATED COUNT: 14.3 % (ref 11.5–14.5)
EST. GFR  (AFRICAN AMERICAN): >60 ML/MIN/1.73 M^2
EST. GFR  (NON AFRICAN AMERICAN): >60 ML/MIN/1.73 M^2
FERRITIN SERPL-MCNC: 352 NG/ML (ref 20–300)
GLUCOSE SERPL-MCNC: 296 MG/DL (ref 70–110)
GLUCOSE UR QL STRIP: ABNORMAL
GRAM STN SPEC: NORMAL
HCT VFR BLD AUTO: 27.7 % (ref 40–54)
HGB BLD-MCNC: 8.8 G/DL (ref 14–18)
HGB UR QL STRIP: NEGATIVE
HYALINE CASTS UR QL AUTO: 0 /LPF
IMM GRANULOCYTES # BLD AUTO: 0.05 K/UL (ref 0–0.04)
IMM GRANULOCYTES NFR BLD AUTO: 0.6 % (ref 0–0.5)
KETONES UR QL STRIP: NEGATIVE
LDH SERPL L TO P-CCNC: 159 U/L (ref 110–260)
LEUKOCYTE ESTERASE UR QL STRIP: NEGATIVE
LYMPHOCYTES # BLD AUTO: 2.7 K/UL (ref 1–4.8)
LYMPHOCYTES NFR BLD: 32.6 % (ref 18–48)
MAGNESIUM SERPL-MCNC: 1.7 MG/DL (ref 1.6–2.6)
MCH RBC QN AUTO: 26.4 PG (ref 27–31)
MCHC RBC AUTO-ENTMCNC: 31.8 G/DL (ref 32–36)
MCV RBC AUTO: 83 FL (ref 82–98)
MICROSCOPIC COMMENT: ABNORMAL
MONOCYTES # BLD AUTO: 0.8 K/UL (ref 0.3–1)
MONOCYTES NFR BLD: 9.8 % (ref 4–15)
NEUTROPHILS # BLD AUTO: 4.6 K/UL (ref 1.8–7.7)
NEUTROPHILS NFR BLD: 54.7 % (ref 38–73)
NITRITE UR QL STRIP: NEGATIVE
NRBC BLD-RTO: 0 /100 WBC
PH UR STRIP: 6 [PH] (ref 5–8)
PHOSPHATE SERPL-MCNC: 3.2 MG/DL (ref 2.7–4.5)
PLATELET # BLD AUTO: 384 K/UL (ref 150–350)
PMV BLD AUTO: 10.2 FL (ref 9.2–12.9)
POCT GLUCOSE: 257 MG/DL (ref 70–110)
POCT GLUCOSE: 374 MG/DL (ref 70–110)
POCT GLUCOSE: 377 MG/DL (ref 70–110)
POTASSIUM SERPL-SCNC: 3.7 MMOL/L (ref 3.5–5.1)
PROT SERPL-MCNC: 10.3 G/DL (ref 6–8.4)
PROT UR QL STRIP: ABNORMAL
RBC # BLD AUTO: 3.33 M/UL (ref 4.6–6.2)
RBC #/AREA URNS AUTO: 2 /HPF (ref 0–4)
SODIUM SERPL-SCNC: 132 MMOL/L (ref 136–145)
SP GR UR STRIP: 1.02 (ref 1–1.03)
SQUAMOUS #/AREA URNS AUTO: 1 /HPF
URN SPEC COLLECT METH UR: ABNORMAL
VANCOMYCIN TROUGH SERPL-MCNC: 14.6 UG/ML (ref 10–22)
WBC # BLD AUTO: 8.41 K/UL (ref 3.9–12.7)
WBC #/AREA URNS AUTO: 1 /HPF (ref 0–5)
YEAST UR QL AUTO: ABNORMAL

## 2020-06-10 PROCEDURE — 87077 CULTURE AEROBIC IDENTIFY: CPT | Mod: 59

## 2020-06-10 PROCEDURE — 87186 SC STD MICRODIL/AGAR DIL: CPT | Mod: 59

## 2020-06-10 PROCEDURE — 84100 ASSAY OF PHOSPHORUS: CPT

## 2020-06-10 PROCEDURE — S0030 INJECTION, METRONIDAZOLE: HCPCS | Performed by: STUDENT IN AN ORGANIZED HEALTH CARE EDUCATION/TRAINING PROGRAM

## 2020-06-10 PROCEDURE — 63600175 PHARM REV CODE 636 W HCPCS: Performed by: STUDENT IN AN ORGANIZED HEALTH CARE EDUCATION/TRAINING PROGRAM

## 2020-06-10 PROCEDURE — 83615 LACTATE (LD) (LDH) ENZYME: CPT

## 2020-06-10 PROCEDURE — 99232 PR SUBSEQUENT HOSPITAL CARE,LEVL II: ICD-10-PCS | Mod: ,,, | Performed by: INTERNAL MEDICINE

## 2020-06-10 PROCEDURE — 85379 FIBRIN DEGRADATION QUANT: CPT

## 2020-06-10 PROCEDURE — 87102 FUNGUS ISOLATION CULTURE: CPT

## 2020-06-10 PROCEDURE — 87076 CULTURE ANAEROBE IDENT EACH: CPT | Mod: 59

## 2020-06-10 PROCEDURE — 36000707: Performed by: COLON & RECTAL SURGERY

## 2020-06-10 PROCEDURE — 83735 ASSAY OF MAGNESIUM: CPT

## 2020-06-10 PROCEDURE — 36000706: Performed by: COLON & RECTAL SURGERY

## 2020-06-10 PROCEDURE — D9220A PRA ANESTHESIA: Mod: CRNA,,, | Performed by: NURSE ANESTHETIST, CERTIFIED REGISTERED

## 2020-06-10 PROCEDURE — 87116 MYCOBACTERIA CULTURE: CPT

## 2020-06-10 PROCEDURE — 87205 SMEAR GRAM STAIN: CPT

## 2020-06-10 PROCEDURE — 25000003 PHARM REV CODE 250: Performed by: STUDENT IN AN ORGANIZED HEALTH CARE EDUCATION/TRAINING PROGRAM

## 2020-06-10 PROCEDURE — 87070 CULTURE OTHR SPECIMN AEROBIC: CPT

## 2020-06-10 PROCEDURE — 86140 C-REACTIVE PROTEIN: CPT

## 2020-06-10 PROCEDURE — 82728 ASSAY OF FERRITIN: CPT

## 2020-06-10 PROCEDURE — 11000001 HC ACUTE MED/SURG PRIVATE ROOM

## 2020-06-10 PROCEDURE — 37000009 HC ANESTHESIA EA ADD 15 MINS: Performed by: COLON & RECTAL SURGERY

## 2020-06-10 PROCEDURE — 87106 FUNGI IDENTIFICATION YEAST: CPT

## 2020-06-10 PROCEDURE — D9220A PRA ANESTHESIA: Mod: ANES,,, | Performed by: ANESTHESIOLOGY

## 2020-06-10 PROCEDURE — 25000003 PHARM REV CODE 250: Performed by: NURSE ANESTHETIST, CERTIFIED REGISTERED

## 2020-06-10 PROCEDURE — 46040 PR I&D PERIRECTAL ABSCESS: ICD-10-PCS | Mod: ,,, | Performed by: COLON & RECTAL SURGERY

## 2020-06-10 PROCEDURE — 82010 KETONE BODYS QUAN: CPT

## 2020-06-10 PROCEDURE — C9399 UNCLASSIFIED DRUGS OR BIOLOG: HCPCS | Performed by: STUDENT IN AN ORGANIZED HEALTH CARE EDUCATION/TRAINING PROGRAM

## 2020-06-10 PROCEDURE — D9220A PRA ANESTHESIA: ICD-10-PCS | Mod: CRNA,,, | Performed by: NURSE ANESTHETIST, CERTIFIED REGISTERED

## 2020-06-10 PROCEDURE — 36415 COLL VENOUS BLD VENIPUNCTURE: CPT

## 2020-06-10 PROCEDURE — 37000008 HC ANESTHESIA 1ST 15 MINUTES: Performed by: COLON & RECTAL SURGERY

## 2020-06-10 PROCEDURE — 99232 SBSQ HOSP IP/OBS MODERATE 35: CPT | Mod: ,,, | Performed by: INTERNAL MEDICINE

## 2020-06-10 PROCEDURE — 80202 ASSAY OF VANCOMYCIN: CPT

## 2020-06-10 PROCEDURE — 87206 SMEAR FLUORESCENT/ACID STAI: CPT | Mod: 91

## 2020-06-10 PROCEDURE — 85025 COMPLETE CBC W/AUTO DIFF WBC: CPT

## 2020-06-10 PROCEDURE — D9220A PRA ANESTHESIA: ICD-10-PCS | Mod: ANES,,, | Performed by: ANESTHESIOLOGY

## 2020-06-10 PROCEDURE — 87075 CULTR BACTERIA EXCEPT BLOOD: CPT

## 2020-06-10 PROCEDURE — 63600175 PHARM REV CODE 636 W HCPCS: Performed by: NURSE ANESTHETIST, CERTIFIED REGISTERED

## 2020-06-10 PROCEDURE — 87015 SPECIMEN INFECT AGNT CONCNTJ: CPT

## 2020-06-10 PROCEDURE — 94761 N-INVAS EAR/PLS OXIMETRY MLT: CPT

## 2020-06-10 PROCEDURE — 46040 I&D ISCHIORCT&/PERIRCT ABSC: CPT | Mod: ,,, | Performed by: COLON & RECTAL SURGERY

## 2020-06-10 PROCEDURE — 80053 COMPREHEN METABOLIC PANEL: CPT

## 2020-06-10 PROCEDURE — 63600175 PHARM REV CODE 636 W HCPCS: Performed by: INTERNAL MEDICINE

## 2020-06-10 RX ORDER — MORPHINE SULFATE 4 MG/ML
4 INJECTION, SOLUTION INTRAMUSCULAR; INTRAVENOUS EVERY 4 HOURS PRN
Status: DISCONTINUED | OUTPATIENT
Start: 2020-06-10 | End: 2020-06-14 | Stop reason: HOSPADM

## 2020-06-10 RX ORDER — INSULIN ASPART 100 [IU]/ML
1-10 INJECTION, SOLUTION INTRAVENOUS; SUBCUTANEOUS EVERY 6 HOURS PRN
Status: DISCONTINUED | OUTPATIENT
Start: 2020-06-10 | End: 2020-06-11

## 2020-06-10 RX ORDER — FENTANYL CITRATE 50 UG/ML
INJECTION, SOLUTION INTRAMUSCULAR; INTRAVENOUS
Status: DISCONTINUED | OUTPATIENT
Start: 2020-06-10 | End: 2020-06-10

## 2020-06-10 RX ORDER — LIDOCAINE HYDROCHLORIDE 20 MG/ML
INJECTION INTRAVENOUS
Status: DISCONTINUED | OUTPATIENT
Start: 2020-06-10 | End: 2020-06-10

## 2020-06-10 RX ORDER — MIDAZOLAM HYDROCHLORIDE 1 MG/ML
INJECTION, SOLUTION INTRAMUSCULAR; INTRAVENOUS
Status: DISCONTINUED | OUTPATIENT
Start: 2020-06-10 | End: 2020-06-10

## 2020-06-10 RX ORDER — INSULIN ASPART 100 [IU]/ML
10 INJECTION, SOLUTION INTRAVENOUS; SUBCUTANEOUS
Status: DISCONTINUED | OUTPATIENT
Start: 2020-06-10 | End: 2020-06-12

## 2020-06-10 RX ORDER — DEXMEDETOMIDINE HYDROCHLORIDE 100 UG/ML
INJECTION, SOLUTION INTRAVENOUS
Status: DISCONTINUED | OUTPATIENT
Start: 2020-06-10 | End: 2020-06-10

## 2020-06-10 RX ORDER — ACETAMINOPHEN 10 MG/ML
INJECTION, SOLUTION INTRAVENOUS
Status: DISCONTINUED | OUTPATIENT
Start: 2020-06-10 | End: 2020-06-10

## 2020-06-10 RX ORDER — ROCURONIUM BROMIDE 10 MG/ML
INJECTION, SOLUTION INTRAVENOUS
Status: DISCONTINUED | OUTPATIENT
Start: 2020-06-10 | End: 2020-06-10

## 2020-06-10 RX ORDER — DEXAMETHASONE SODIUM PHOSPHATE 4 MG/ML
INJECTION, SOLUTION INTRA-ARTICULAR; INTRALESIONAL; INTRAMUSCULAR; INTRAVENOUS; SOFT TISSUE
Status: DISCONTINUED | OUTPATIENT
Start: 2020-06-10 | End: 2020-06-10

## 2020-06-10 RX ORDER — ONDANSETRON 2 MG/ML
INJECTION INTRAMUSCULAR; INTRAVENOUS
Status: DISCONTINUED | OUTPATIENT
Start: 2020-06-10 | End: 2020-06-10

## 2020-06-10 RX ORDER — FENTANYL CITRATE 50 UG/ML
INJECTION, SOLUTION INTRAMUSCULAR; INTRAVENOUS
Status: DISPENSED
Start: 2020-06-10 | End: 2020-06-11

## 2020-06-10 RX ORDER — PHENYLEPHRINE HYDROCHLORIDE 10 MG/ML
INJECTION INTRAVENOUS
Status: DISCONTINUED | OUTPATIENT
Start: 2020-06-10 | End: 2020-06-10

## 2020-06-10 RX ORDER — PROPOFOL 10 MG/ML
VIAL (ML) INTRAVENOUS
Status: DISCONTINUED | OUTPATIENT
Start: 2020-06-10 | End: 2020-06-10

## 2020-06-10 RX ADMIN — ROCURONIUM BROMIDE 50 MG: 10 INJECTION, SOLUTION INTRAVENOUS at 06:06

## 2020-06-10 RX ADMIN — INSULIN ASPART 5 UNITS: 100 INJECTION, SOLUTION INTRAVENOUS; SUBCUTANEOUS at 07:06

## 2020-06-10 RX ADMIN — DEXAMETHASONE SODIUM PHOSPHATE 8 MG: 4 INJECTION, SOLUTION INTRAMUSCULAR; INTRAVENOUS at 06:06

## 2020-06-10 RX ADMIN — DEXMEDETOMIDINE HYDROCHLORIDE 4 MCG: 100 INJECTION, SOLUTION, CONCENTRATE INTRAVENOUS at 06:06

## 2020-06-10 RX ADMIN — ESTRADIOL 1 MG: 1 TABLET ORAL at 09:06

## 2020-06-10 RX ADMIN — LIDOCAINE HYDROCHLORIDE 100 MG: 20 INJECTION, SOLUTION INTRAVENOUS at 06:06

## 2020-06-10 RX ADMIN — SPIRONOLACTONE 25 MG: 25 TABLET, FILM COATED ORAL at 09:06

## 2020-06-10 RX ADMIN — MIDAZOLAM HYDROCHLORIDE 2 MG: 1 INJECTION, SOLUTION INTRAMUSCULAR; INTRAVENOUS at 05:06

## 2020-06-10 RX ADMIN — AMPICILLIN SODIUM AND SULBACTAM SODIUM 3 G: 2; 1 INJECTION, POWDER, FOR SOLUTION INTRAMUSCULAR; INTRAVENOUS at 05:06

## 2020-06-10 RX ADMIN — DEXMEDETOMIDINE HYDROCHLORIDE 8 MCG: 100 INJECTION, SOLUTION, CONCENTRATE INTRAVENOUS at 06:06

## 2020-06-10 RX ADMIN — INSULIN DETEMIR 10 UNITS: 100 INJECTION, SOLUTION SUBCUTANEOUS at 09:06

## 2020-06-10 RX ADMIN — PHENYLEPHRINE HYDROCHLORIDE 200 MCG: 10 INJECTION INTRAVENOUS at 06:06

## 2020-06-10 RX ADMIN — MORPHINE SULFATE 4 MG: 4 INJECTION INTRAVENOUS at 07:06

## 2020-06-10 RX ADMIN — INSULIN ASPART 3 UNITS: 100 INJECTION, SOLUTION INTRAVENOUS; SUBCUTANEOUS at 08:06

## 2020-06-10 RX ADMIN — INSULIN DETEMIR 30 UNITS: 100 INJECTION, SOLUTION SUBCUTANEOUS at 08:06

## 2020-06-10 RX ADMIN — VANCOMYCIN HYDROCHLORIDE 125 MG: KIT at 05:06

## 2020-06-10 RX ADMIN — METRONIDAZOLE 500 MG: 500 INJECTION, SOLUTION INTRAVENOUS at 04:06

## 2020-06-10 RX ADMIN — ONDANSETRON 4 MG: 2 INJECTION, SOLUTION INTRAMUSCULAR; INTRAVENOUS at 06:06

## 2020-06-10 RX ADMIN — PROPOFOL 200 MG: 10 INJECTION, EMULSION INTRAVENOUS at 06:06

## 2020-06-10 RX ADMIN — METRONIDAZOLE 500 MG: 500 INJECTION, SOLUTION INTRAVENOUS at 12:06

## 2020-06-10 RX ADMIN — ACETAMINOPHEN 1000 MG: 10 INJECTION, SOLUTION INTRAVENOUS at 06:06

## 2020-06-10 RX ADMIN — CEFEPIME 2 G: 2 INJECTION, POWDER, FOR SOLUTION INTRAVENOUS at 09:06

## 2020-06-10 RX ADMIN — VANCOMYCIN HYDROCHLORIDE 125 MG: KIT at 12:06

## 2020-06-10 RX ADMIN — PHENYLEPHRINE HYDROCHLORIDE 100 MCG: 10 INJECTION INTRAVENOUS at 06:06

## 2020-06-10 RX ADMIN — FENTANYL CITRATE 100 MCG: 50 INJECTION, SOLUTION INTRAMUSCULAR; INTRAVENOUS at 06:06

## 2020-06-10 RX ADMIN — SODIUM CHLORIDE, SODIUM GLUCONATE, SODIUM ACETATE, POTASSIUM CHLORIDE, MAGNESIUM CHLORIDE, SODIUM PHOSPHATE, DIBASIC, AND POTASSIUM PHOSPHATE: .53; .5; .37; .037; .03; .012; .00082 INJECTION, SOLUTION INTRAVENOUS at 05:06

## 2020-06-10 NOTE — ANESTHESIA PROCEDURE NOTES
Intubation  Performed by: Sharlene Gonzalez CRNA  Authorized by: Iglesia Williamson Jr., MD     Intubation:     Induction:  Intravenous    Intubated:  Postinduction    Mask Ventilation:  N/a (RSI)    Attempts:  1    Attempted By:  CRNA    Method of Intubation:  Direct    Blade:  Roca 2    Laryngeal View Grade: Grade I - full view of chords      Difficult Airway Encountered?: No      Complications:  None    Airway Device:  Oral endotracheal tube    Airway Device Size:  7.5    Style/Cuff Inflation:  Cuffed    Inflation Amount (mL):  7    Tube secured:  23    Secured at:  The lips    Placement Verified By:  Capnometry    Complicating Factors:  None    Findings Post-Intubation:  BS equal bilateral

## 2020-06-10 NOTE — SUBJECTIVE & OBJECTIVE
Subjective:     Interval History: no acute events overnight.     Post-Op Info:  Procedure(s) (LRB):  INCISION, ABSCESS, PERIRECTAL (N/A)          Medications:  Continuous Infusions:  Scheduled Meds:   ceFEPime (MAXIPIME) IVPB  2 g Intravenous Q12H    estradioL  1 mg Oral Daily    insulin detemir U-100  25 Units Subcutaneous QHS    lidocaine HCL 10 mg/ml (1%)  20 mL Other Once    metronidazole  500 mg Intravenous Q8H    spironolactone  25 mg Oral Daily    vancomycin (VANCOCIN) IVPB  15 mg/kg Intravenous Q12H    vancomycin  125 mg Oral Q6H     PRN Meds:   acetaminophen    Dextrose 10% Bolus    Dextrose 10% Bolus    Dextrose 10% Bolus    glucagon (human recombinant)    glucose    glucose    insulin aspart U-100    morphine    sodium chloride 0.9%    vancomycin - pharmacy to dose        Objective:     Vital Signs (Most Recent):  Temp: 97.6 °F (36.4 °C) (06/10/20 0915)  Pulse: 72 (06/10/20 0915)  Resp: 18 (06/10/20 0915)  BP: (!) 142/77 (06/10/20 0915)  SpO2: 97 % (06/10/20 0915) Vital Signs (24h Range):  Temp:  [97.6 °F (36.4 °C)-98.2 °F (36.8 °C)] 97.6 °F (36.4 °C)  Pulse:  [72-90] 72  Resp:  [17-18] 18  SpO2:  [97 %-100 %] 97 %  BP: (120-142)/(62-77) 142/77     Intake/Output - Last 3 Shifts       06/08 0700 - 06/09 0659 06/09 0700 - 06/10 0659 06/10 0700 - 06/11 0659    P.O. 0 600     IV Piggyback  100     Total Intake(mL/kg) 0 (0) 700 (8.3)     Urine (mL/kg/hr)   800 (1.7)    Stool   0    Total Output   800    Net 0 +700 -800           Urine Occurrence 2 x 0 x 3 x    Stool Occurrence 5 x 1 x 0 x          Physical Exam   Constitutional: He is oriented to person, place, and time. He appears well-developed and well-nourished.   HENT:   Head: Normocephalic.   Eyes: Pupils are equal, round, and reactive to light.   Neck: Normal range of motion.   Cardiovascular: Normal rate and regular rhythm.   Pulmonary/Chest: Effort normal.   Abdominal: Soft. He exhibits no distension.   Musculoskeletal: Normal  range of motion.   Neurological: He is alert and oriented to person, place, and time.   Skin: Skin is warm and dry.       Anorectal Exam:  Unchanged abscess cavity with devitalized tissue    Significant Labs:  BMP (Last 3 Results):   Recent Labs   Lab 06/08/20  1311 06/09/20  0620 06/10/20  0854   *  240* 297* 296*   *  133* 132* 132*   K 4.0  4.0 3.9 3.7     101 99 98   CO2 26  26 27 28   BUN 9  9 9 8   CREATININE 1.0  1.0 1.1 1.1   CALCIUM 9.3  9.3 9.0 9.2   MG 1.7  1.7 1.7 1.7     CBC (Last 3 Results):   Recent Labs   Lab 06/08/20  1311 06/09/20  0620 06/10/20  0854   WBC 8.61  8.61 8.34 8.41   RBC 3.29*  3.29* 3.32* 3.33*   HGB 9.0*  9.0* 8.9* 8.8*   HCT 27.7*  27.7* 27.9* 27.7*   *  360* 358* 384*   MCV 84  84 84 83   MCH 27.4  27.4 26.8* 26.4*   MCHC 32.5  32.5 31.9* 31.8*       Significant Diagnostics:  I have reviewed all pertinent imaging results/findings within the past 24 hours.

## 2020-06-10 NOTE — ASSESSMENT & PLAN NOTE
25yo MTF transgender woman with PMH DM I, IBS, previous gluteal abscesses who presents with subjective and objective fevers and gluteal abscess for several days. She has also been experiencing episodes of involuntary diarrhea, which can occur as many as ten times per day,  by days without bowel movements, though she reports this has been ongoing for the past three years since she was diagnosed with IBS. In March of 2020, she had an abscess on her left inner buttock and left medial thigh. Cultures were positive for MRSA, e coli, and prevotella. She was discharged with a PICC line and completed course of vancomycin (3/2 - 3/27, was supposed to complete on 3/29 but discontinued 2 days early due to THEE), metronidazole 500 mg q8 (3/8 - 3/14), and ceftriaxone 2 g q24 (3/7 - 3/13). KEL in March of 2020 negative for vegetations. Recent admission to [facility] with gluteal abscess and fever. At that time, she received vancomycin, piperacillin-tazobactam, and clindamycin in the ED, and was later switched to vancomycin and ceftriaxone. Blood cultures drawn on 6/2 with no growth x5 days. Wound cultures not obtained. HIV and HCV negative on 6/2/2020. Upon current admission, afebrile, HR 90, -147 systolic (one-time BP of 105/50), SpO2 100% RA. Lactic acid on 6.7 was 1.7. There is less of a concern for sepsis currently. Etiology of repeated gluteal abscesses could be related to diarrhea as well as repeated receptive anal sex.     - follow-up blood cultures  - follow-up wound cultures  - wound care  - IV vancomycin, cefepime, and metronidazole  - general surgery consulted and recommend CRS consult  - CRS consulted with plans for OR on 6/10  - NPO midnight on 6/9  - encouragement of anal sex hygiene

## 2020-06-10 NOTE — PROGRESS NOTES
[facility]  Hospital Medicine  Progress Note    Patient Name: Pratik Torres  MRN: [id number]  Patient Class: IP- Inpatient   Admission Date: 6/7/2020  Length of Stay: 2 days  Attending Physician: Flower Saldivar MD  Primary Care Provider: Felipe Summers MD        Subjective:     Principal Problem:Perirectal abscess        HPI:  Odessa Torres is a 24-year-old MTF transgender woman with PMH DM I, IBS, previous gluteal abscesses who presents with subjective and objective fevers and gluteal abscess for several days. She reports the abscess on her right buttock is tender only to palpation. She has also been experiencing episodes of involuntary diarrhea, which can occur as many as ten times per day,  by days without bowel movements, though she reports this has been ongoing for the past three years since she was diagnosed with IBS. She denies chest pain, shortness of breath, nausea, vomiting, myalgias, cough.     In March of 2020, she had an abscess on her left inner buttock and left medial thigh. Cultures were positive for MRSA, e coli, and prevotella. She was discharged with a PICC line and completed course of vancomycin (3/2 - 3/27, was supposed to complete on 3/29 but discontinued 2 days early due to THEE), metronidazole 500 mg q8 (3/8 - 3/14), and ceftriaxone 2 g q24 (3/7 - 3/13). KEL in March of 2020 negative for vegetations.     She was diagnosed with COVID-19 in April of 2020, at which point she was also treated with ceftriaxone for five days for klebsiella UTI.     She was recently admitted to [facility] in [place], where she presented with gluteal abscess and fever (101.9 in ED), during which she was admitted to the ICU for DKA and sepsis presumed to be secondary to gluteal abscess. At that time, she received vancomycin, piperacillin-tazobactam, and clindamycin in the ED, and was later switched to vancomycin and ceftriaxone. Blood cultures drawn on  "6/2 with no growth x5 days. Wound cultures not obtained. HIV and HCV negative on 6/2/2020. Patient was evaluated by surgical team, who recommended further debridement in the OR, however, there was concern regarding positive COVID status. Per patient, left AMA due to dissatisfaction with her hospital experience. Per patient, was repeatedly referred to as "Ms. Saxena," rather than "Odessa," felt like people were talking about her disrespectfully in the hallways outside of her room, and was not given food for a prolonged period of time while waiting to undergo surgery, which had to be rescheduled. She then represented to the ED at [facility] for continued fevers.     Odessa does not smoke cigarettes, she drinks alcohol only at parties at most several days out of the month, has consumed edible marijuana, has used black marva, denies cocaine, heroin, or any IV drug use. She lived at home with her family in [place], though reports yesterday she became homeless as her family kicked her out. 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 She identifies as female (pronouns she, her), has oral and anal sex with men, uses condoms consistently, has a history of treated syphilis, and denies history of any other STIs, and reports no sexual activity in the past four months. She has received PrEP previously. She follows at [facility] for her estradiol and spironolactone. She takes glargine 40 units once daily and aspart 10 TID with meals. Denies allergies to medications, though shrimp makes her throat close up.     Upon admission, afebrile, HR 90, -147 systolic (one-time BP of 105/50), SpO2 100% RA. Hemoglobin stable at 9, creatinine 1, anion gap 6, beta-hydroxybutyrate 0.2, . Elevated inflammatory markers. COVID positive day prior.     Overview/Hospital Course:  Admitted to hospital medicine on 6/8 for perirectal abscess associated with fevers. Started on IV vancomycin, ceftriaxone, and metronidazole. CT abdomen/pelvis " on 6/8 showed 7 cm right perirectal abscess communicating with the skin surface. General surgery consulted and recommended colorectal surgery consult. CRS plan to take to OR on 6/10. Started basal-bolus insulin regimen and continued home estradiol and spironolactone. Ceftriaxone switched to cefepime given history of diabetes and desire to provide pseudomonal coverage. C diff positive so started PO vancomycin. Wound culture growing e coli and enterococcus. Changed IV antibiotics to ampicillin-sulbactam. To OR on 6/10.    Interval History: Odessa feels about the same today. Blood glucose remains elevated.    Review of Systems   Constitutional: Negative for activity change, chills and fever.   Eyes: Negative for photophobia and visual disturbance.   Respiratory: Negative for cough, chest tightness and shortness of breath.    Cardiovascular: Negative for chest pain and leg swelling.   Gastrointestinal: Positive for diarrhea. Negative for abdominal pain, blood in stool, constipation, nausea and vomiting.        Perirectal pain   Endocrine: Negative for polydipsia and polyuria.   Genitourinary: Negative for difficulty urinating and dysuria.   Musculoskeletal: Negative for gait problem and myalgias.   Neurological: Negative for syncope and headaches.   Psychiatric/Behavioral: Negative for agitation and confusion.     Objective:     Vital Signs (Most Recent):  Temp: 97.6 °F (36.4 °C) (06/10/20 0915)  Pulse: 72 (06/10/20 0915)  Resp: 18 (06/10/20 0915)  BP: (!) 142/77 (06/10/20 0915)  SpO2: 97 % (06/10/20 0915) Vital Signs (24h Range):  Temp:  [97.6 °F (36.4 °C)-98.2 °F (36.8 °C)] 97.6 °F (36.4 °C)  Pulse:  [72-90] 72  Resp:  [17-18] 18  SpO2:  [97 %-98 %] 97 %  BP: (120-142)/(62-77) 142/77     Weight: 83 kg (182 lb 15.7 oz)  Body mass index is 24.82 kg/m².    Intake/Output Summary (Last 24 hours) at 6/10/2020 1356  Last data filed at 6/10/2020 1000  Gross per 24 hour   Intake 120 ml   Output 800 ml   Net -680 ml       Physical Exam   Constitutional: He appears well-developed and well-nourished. No distress.   Lying on abdomen   HENT:   Head: Normocephalic and atraumatic.   Eyes: EOM are normal. No scleral icterus.   Neck: Normal range of motion. Neck supple.   Cardiovascular: Normal rate, regular rhythm and intact distal pulses.   No murmur heard.  Pulmonary/Chest: Effort normal. No stridor. No respiratory distress. He has no wheezes.   Abdominal: Soft. Bowel sounds are normal. He exhibits no distension. There is no tenderness.   Genitourinary:   Genitourinary Comments: ~6 cm open wound right buttock with brown foul-smelling drainage    Musculoskeletal: He exhibits no edema or tenderness.   Neurological: He is alert.   oriented   Skin: Skin is warm and dry. He is not diaphoretic.     Significant Labs: All pertinent labs within the past 24 hours have been reviewed.    Significant Imaging: I have reviewed and interpreted all pertinent imaging results/findings within the past 24 hours.      Assessment/Plan:      * Perirectal abscess  25yo MTF transgender woman with PMH DM I, IBS, previous gluteal abscesses who presents with subjective and objective fevers and gluteal abscess for several days. She has also been experiencing episodes of involuntary diarrhea, which can occur as many as ten times per day,  by days without bowel movements, though she reports this has been ongoing for the past three years since she was diagnosed with IBS. In March of 2020, she had an abscess on her left inner buttock and left medial thigh. Cultures were positive for MRSA, e coli, and prevotella. She was discharged with a PICC line and completed course of vancomycin (3/2 - 3/27, was supposed to complete on 3/29 but discontinued 2 days early due to THEE), metronidazole 500 mg q8 (3/8 - 3/14), and ceftriaxone 2 g q24 (3/7 - 3/13). KEL in March of 2020 negative for vegetations. Recent admission to [facility] with gluteal abscess and fever. At that time,  she received vancomycin, piperacillin-tazobactam, and clindamycin in the ED, and was later switched to vancomycin and ceftriaxone. Blood cultures drawn on 6/2 with no growth x5 days. Wound cultures not obtained. HIV and HCV negative on 6/2/2020. Upon current admission, afebrile, HR 90, -147 systolic (one-time BP of 105/50), SpO2 100% RA. Lactic acid on 6.7 was 1.7. There is less of a concern for sepsis currently. Etiology of repeated gluteal abscesses could be related to diarrhea as well as repeated receptive anal sex. Wound cultures growing e coli and enterococcus. Blood cultures NGTD.     - follow-up blood cultures  - follow-up intraoperative wound cultures  - wound care  - IV ampicillin-sulbactam  - general surgery consulted and recommend CRS consult  - CRS consulted with plans for OR on 6/10  - encouragement of anal sex hygiene    COVID-19 virus detected  SpO2 100% on RA.   - COVID-19 testing Collection Date: 6/7/2020 Collection Time:  11:33 AM  - Infection Control notified      - Isolation:   - Airborne, Contact and Droplet Precautions  - Cohort patients into COVID units  - N95 masks must be fit tested, wear eye protection  - 20 second hand hygiene              - Limit visitors per hospital policy              - Consolidating lab draws, nursing care, provider visits, and interventions    - Diagnostics: (leukopenia, hyponatremia, hyperferritinemia, elevated troponin, elevated d-dimer, age, and comorbidities are significant predictors of poor clinical outcome)  CBC and CMP    - Management:  Supplemental O2 to maintain SpO2 >92%  Continuous/intermittent Pulse Ox  Albuterol treatment PRN    Clostridium difficile infection  Diarrhea  Reports days with 10 episodes of involuntary loose stools as well as days without any bowel movements. C diff positive.  - PO vancomycin    Type 1 diabetes mellitus with hyperglycemia  Takes glargine 40 units daily and aspart 10 TID with meals. Recent admission to [facility] ICU for DKA.  Upon this admission, beta-hydroxybutyrate normal without anion gap. Blood sugar 247.  - diabetic diet  - POCT BG ACHS  - goal -180 while inpatient  - insulin detemir 30 units QHS  - aspart 10 TID with meals  - moderate dose sliding scale insulin  - titrate insulin as appropriate    Anemia  Hemoglobin stable at 9 (12.2 in November of 2019). No active signs of bleeding. Normal pulse and normotensive. Iron studies consistent with iron deficiency anemia.  - consider supplementation upon discharge    VTE Risk Mitigation (From admission, onward)         Ordered     Place sequential compression device  Until discontinued      06/08/20 0719     IP VTE HIGH RISK PATIENT  Once      06/08/20 0658              Code status: full  DVT prophylaxis: SCDs prior to surgery; will re-start enoxaparin 40 mg PO daily when safe after surgery  Diet: diabetic (after surgery)  Disposition: intraoperative culture results for antibiotic adjustments     Esther Mc MD  Department of Hospital Medicine   [facility]

## 2020-06-10 NOTE — CONSULTS
VANCOMYCIN DOSING BY PHARMACY DISCONTINUATION NOTE    The pharmacy consult for vancomycin dosing has been discontinued.     Vancomycin Dosing by Pharmacy Consult will sign-off. Please reconsult if necessary. Thank you for allowing us to participate in this patient's care.     Stella Lane, JonelleD  [phone]

## 2020-06-10 NOTE — CONSULTS
Wound care consulted for right buttock I & D  The patient refused to have area assessed, the surgeons are taking care of it.    Patient is scheduled for possible I & D today.   Wound care signing off, please reconsult if needed  MICHELE Nuno RN, [facility]  [phone]

## 2020-06-10 NOTE — ASSESSMENT & PLAN NOTE
25 yo MTF transgender female w type 1 diabetes presents with recurring perianal abscess with cavity tracking to rectum on CT, significant devitalized tissue in cavity. Also with chronic diarrhea of uncertain etiology  1. OR today for EUA and debridement. Continue abx  2. Diarrhea- C Diff tx per primary team.

## 2020-06-10 NOTE — SUBJECTIVE & OBJECTIVE
Interval History: Odessa feels about the same today. Blood glucose remains elevated.    Review of Systems   Constitutional: Negative for activity change, chills and fever.   Eyes: Negative for photophobia and visual disturbance.   Respiratory: Negative for cough, chest tightness and shortness of breath.    Cardiovascular: Negative for chest pain and leg swelling.   Gastrointestinal: Positive for diarrhea. Negative for abdominal pain, blood in stool, constipation, nausea and vomiting.        Perirectal pain   Endocrine: Negative for polydipsia and polyuria.   Genitourinary: Negative for difficulty urinating and dysuria.   Musculoskeletal: Negative for gait problem and myalgias.   Neurological: Negative for syncope and headaches.   Psychiatric/Behavioral: Negative for agitation and confusion.     Objective:     Vital Signs (Most Recent):  Temp: 97.6 °F (36.4 °C) (06/10/20 0915)  Pulse: 72 (06/10/20 0915)  Resp: 18 (06/10/20 0915)  BP: (!) 142/77 (06/10/20 0915)  SpO2: 97 % (06/10/20 0915) Vital Signs (24h Range):  Temp:  [97.6 °F (36.4 °C)-98.2 °F (36.8 °C)] 97.6 °F (36.4 °C)  Pulse:  [72-90] 72  Resp:  [17-18] 18  SpO2:  [97 %-98 %] 97 %  BP: (120-142)/(62-77) 142/77     Weight: 83 kg (182 lb 15.7 oz)  Body mass index is 24.82 kg/m².    Intake/Output Summary (Last 24 hours) at 6/10/2020 1356  Last data filed at 6/10/2020 1000  Gross per 24 hour   Intake 120 ml   Output 800 ml   Net -680 ml      Physical Exam   Constitutional: He appears well-developed and well-nourished. No distress.   Lying on abdomen   HENT:   Head: Normocephalic and atraumatic.   Eyes: EOM are normal. No scleral icterus.   Neck: Normal range of motion. Neck supple.   Cardiovascular: Normal rate, regular rhythm and intact distal pulses.   No murmur heard.  Pulmonary/Chest: Effort normal. No stridor. No respiratory distress. He has no wheezes.   Abdominal: Soft. Bowel sounds are normal. He exhibits no distension. There is no tenderness.    Genitourinary:   Genitourinary Comments: ~6 cm open wound right buttock with brown foul-smelling drainage    Musculoskeletal: He exhibits no edema or tenderness.   Neurological: He is alert.   oriented   Skin: Skin is warm and dry. He is not diaphoretic.     Significant Labs: All pertinent labs within the past 24 hours have been reviewed.    Significant Imaging: I have reviewed and interpreted all pertinent imaging results/findings within the past 24 hours.

## 2020-06-10 NOTE — PLAN OF CARE
Problem: Adult Inpatient Plan of Care  Goal: Plan of Care Review  Outcome: Ongoing, Progressing   Patient turned and repositioned self independently, utilizing prone position. Continues with right gluteal wound, dressing intact. Patient pain and safety monitored q 1-2 hrs this shift. Blood glucose monitored throughout shift. BG @HS 374ml/dL, insulin given per ordered sliding scale. Ambulates independently without difficulty. Bed locked and in lowest position. Bed side rails elevated x2. Call light and personal belongings in reach. Continues on ABT, no a/e noted. C.diff PCR + Antigen positive, MD notified via secure chat. Remains on contact, airborne, and droplet precautions. NPO since midnight pending scheduled procedure. Will cont. to monitor

## 2020-06-10 NOTE — PROGRESS NOTES
[facility]  Colorectal Surgery  Progress Note    Patient Name: Pratik Torres  MRN: [id number]  Admission Date: 6/7/2020  Hospital Length of Stay: 2 days  Attending Physician: Flower Saldivar MD    Subjective:     Interval History: no acute events overnight.     Post-Op Info:  Procedure(s) (LRB):  INCISION, ABSCESS, PERIRECTAL (N/A)          Medications:  Continuous Infusions:  Scheduled Meds:   ceFEPime (MAXIPIME) IVPB  2 g Intravenous Q12H    estradioL  1 mg Oral Daily    insulin detemir U-100  25 Units Subcutaneous QHS    lidocaine HCL 10 mg/ml (1%)  20 mL Other Once    metronidazole  500 mg Intravenous Q8H    spironolactone  25 mg Oral Daily    vancomycin (VANCOCIN) IVPB  15 mg/kg Intravenous Q12H    vancomycin  125 mg Oral Q6H     PRN Meds:   acetaminophen    Dextrose 10% Bolus    Dextrose 10% Bolus    Dextrose 10% Bolus    glucagon (human recombinant)    glucose    glucose    insulin aspart U-100    morphine    sodium chloride 0.9%    vancomycin - pharmacy to dose        Objective:     Vital Signs (Most Recent):  Temp: 97.6 °F (36.4 °C) (06/10/20 0915)  Pulse: 72 (06/10/20 0915)  Resp: 18 (06/10/20 0915)  BP: (!) 142/77 (06/10/20 0915)  SpO2: 97 % (06/10/20 0915) Vital Signs (24h Range):  Temp:  [97.6 °F (36.4 °C)-98.2 °F (36.8 °C)] 97.6 °F (36.4 °C)  Pulse:  [72-90] 72  Resp:  [17-18] 18  SpO2:  [97 %-100 %] 97 %  BP: (120-142)/(62-77) 142/77     Intake/Output - Last 3 Shifts       06/08 0700 - 06/09 0659 06/09 0700 - 06/10 0659 06/10 0700 - 06/11 0659    P.O. 0 600     IV Piggyback  100     Total Intake(mL/kg) 0 (0) 700 (8.3)     Urine (mL/kg/hr)   800 (1.7)    Stool   0    Total Output   800    Net 0 +700 -800           Urine Occurrence 2 x 0 x 3 x    Stool Occurrence 5 x 1 x 0 x          Physical Exam   Constitutional: He is oriented to person, place, and time. He appears well-developed and well-nourished.   HENT:   Head: Normocephalic.   Eyes: Pupils are  equal, round, and reactive to light.   Neck: Normal range of motion.   Cardiovascular: Normal rate and regular rhythm.   Pulmonary/Chest: Effort normal.   Abdominal: Soft. He exhibits no distension.   Musculoskeletal: Normal range of motion.   Neurological: He is alert and oriented to person, place, and time.   Skin: Skin is warm and dry.       Anorectal Exam:  Unchanged abscess cavity with devitalized tissue    Significant Labs:  BMP (Last 3 Results):   Recent Labs   Lab 06/08/20  1311 06/09/20  0620 06/10/20  0854   *  240* 297* 296*   *  133* 132* 132*   K 4.0  4.0 3.9 3.7     101 99 98   CO2 26  26 27 28   BUN 9  9 9 8   CREATININE 1.0  1.0 1.1 1.1   CALCIUM 9.3  9.3 9.0 9.2   MG 1.7  1.7 1.7 1.7     CBC (Last 3 Results):   Recent Labs   Lab 06/08/20  1311 06/09/20  0620 06/10/20  0854   WBC 8.61  8.61 8.34 8.41   RBC 3.29*  3.29* 3.32* 3.33*   HGB 9.0*  9.0* 8.9* 8.8*   HCT 27.7*  27.7* 27.9* 27.7*   *  360* 358* 384*   MCV 84  84 84 83   MCH 27.4  27.4 26.8* 26.4*   MCHC 32.5  32.5 31.9* 31.8*       Significant Diagnostics:  I have reviewed all pertinent imaging results/findings within the past 24 hours.    Assessment/Plan:     * Gluteal abscess  25 yo MTF transgender female w type 1 diabetes presents with recurring perianal abscess with cavity tracking to rectum on CT, significant devitalized tissue in cavity. Also with chronic diarrhea of uncertain etiology  1. OR today for EUA and debridement. Continue abx  2. Diarrhea- C Diff tx per primary team.          Jae Reyes MD  Colorectal Surgery  [facility]

## 2020-06-10 NOTE — OP NOTE
DATE OF PROCEDURE: 6/10/2020     PREOPERATIVE DIAGNOSES:  Right ischiorectal abscess/wound     POSTOPERATIVE DIAGNOSES:  Right ischiorectal abscess/wound    PROCEDURES PERFORMED:  Debridement of right ischiorectal abscess/wound     SURGEON:  Ronald Santo M.D.     ASSISTANT: Jae Love M.D. [RES]     ANESTHESIA:  General Endotracheal     IV FLUIDS:  1000 mL of crystalloid.     ESTIMATED BLOOD LOSS:  20 mL.     DRAINS:  None     SPECIMENS:   1.  Swab of abscess cavity for culture and sensitivity  2.  Excised skin and fat the anal rectal wound for tissue culture    COMPLICATIONS:  None.     OPERATIVE FINDINGS:  Large right ischiorectal wound with devitalized tissue extending cephalad to involve the posterior aspect of the sacrum and coccyx.  No evidence of communication with the rectum or anal canal.     INDICATIONS:  The patient is a 24-year-old MTF transgender female with a complex right ischiorectal wound/abscess.  She has type 1 diabetes and was recently diagnosed with COVID-19.  She recently had a prolonged hospitalization at [facility] and underwent multiple surgical procedures for this wound before leaving the hospital [place] because of concerns she had about her care there and presented to the ER at [facility] the same day.  She was transferred to [facility] for further care.  Colorectal surgery was consulted for further management of this ischiorectal wound.  She is being brought to the Operating Room today for further management of the wound.     DESCRIPTION OF PROCEDURE:  The patient was identified, brought to the Operating Room and placed on the stretcher in a supine position after obtaining informed consent.  Venous sequential stockings were placed.  After induction of general endotracheal anesthesia, she was repositioned on the operating table in a prone position using chest rolls and adequate padding of all pressure points.  The table was jackknifed and the perianal  region was prepped and draped in the usual sterile fashion.      On initial inspection, there was a wound overlying the right ischial rectal abscess that measured approximately 4 x 4 cm in dimensions.  There was obviously devitalized tissue within it.  We sharply debrided all the devitalized tissue at the edges and base of the wound.  The wound was probed and extended cephalad and caudally for significant distance.  We unroofed the devitalized skin overlying the abscess cavity in each direction for approximately 2 cm.  Superiorly the wound cavity tracked over the posterior aspect of the sacrum and coccyx.  Inferiorly the wound tracked towards the posterolateral wall the rectum though there was no obvious communication with the rectum itself.  We performed lighted anoscopy which revealed normal anal mucosa.  Using a probe we are unable to demonstrated communication between ischiorectal abscess cavity and the anal canal indicating that there was no obvious evidence of of a fistula.     Cultures were taken from the abscess cavity.  The excised skin and subcutaneous fat was also sent to the lab for tissue culture.  The wound was copiously irrigated with sterile saline.  Hemostasis was achieved electrocautery.  Once we were ensured that we had debrided all devitalized tissue the wound was packed with a moistened Kerlix gauze.      The patient tolerated the procedure well with no complications.  She was extubated in the Operating Room and taken to Recovery in satisfactory condition.  All needle, instrument and sponge counts were correct at the end of the case.    I was present throughout the entire procedure.    Ronald Santo MD, FACS, FASCRS  Senior Staff Surgeon  Department of Colon & Rectal Surgery     This note was created using voice recognition software, and may contain some unrecognized transcriptional errors.

## 2020-06-10 NOTE — ASSESSMENT & PLAN NOTE
Hemoglobin stable at 9 (12.2 in November of 2019). No active signs of bleeding. Normal pulse and normotensive. Iron studies consistent with iron deficiency anemia.  - consider supplementation upon discharge

## 2020-06-10 NOTE — BRIEF OP NOTE
[facility]  Brief Operative Note    SUMMARY     Surgery Date: 6/10/2020     Surgeon(s) and Role:     * Ronald Santo MD - Primary     * Jae Reyes MD - Fellow    Assisting Surgeon: None    Pre-op Diagnosis:  Gluteal abscess [L02.31]    Post-op Diagnosis:  Post-Op Diagnosis Codes:     * Gluteal abscess [L02.31]    Procedure(s) (LRB):  INCISION, ABSCESS, PERIRECTAL (N/A)    Anesthesia: Choice    Description of Procedure: Debridement of Right ischiorectal abscess cavity. Cavity extensively debrided and washed out, then packed    Description of the findings of the procedure: Cavity had no clear communication to rectum or anal canal. Cavity extended to sacral bone.     Estimated Blood Loss: * No values recorded between 6/10/2020  6:19 PM and 6/10/2020  6:46 PM *         Specimens:   1. Abscess cavity culture swab  2. Abscess tissue (for culture)\

## 2020-06-10 NOTE — ASSESSMENT & PLAN NOTE
Takes glargine 40 units daily and aspart 10 TID with meals. Recent admission to [facility] ICU for DKA. Upon this admission, beta-hydroxybutyrate normal without anion gap. Blood sugar 247.  - diabetic diet  - POCT BG ACHS  - goal -180 while inpatient  - insulin detemir 30 units QHS  - aspart 10 TID with meals  - moderate dose sliding scale insulin  - titrate insulin as appropriate

## 2020-06-10 NOTE — PROGRESS NOTES
Pt refused SSI due at this time for a FIA=[id number].  She states she is NPO and does not take insulin if she is not eating.

## 2020-06-10 NOTE — ASSESSMENT & PLAN NOTE
23yo MTF transgender woman with PMH DM I, IBS, previous gluteal abscesses who presents with subjective and objective fevers and gluteal abscess for several days. She has also been experiencing episodes of involuntary diarrhea, which can occur as many as ten times per day,  by days without bowel movements, though she reports this has been ongoing for the past three years since she was diagnosed with IBS. In March of 2020, she had an abscess on her left inner buttock and left medial thigh. Cultures were positive for MRSA, e coli, and prevotella. She was discharged with a PICC line and completed course of vancomycin (3/2 - 3/27, was supposed to complete on 3/29 but discontinued 2 days early due to THEE), metronidazole 500 mg q8 (3/8 - 3/14), and ceftriaxone 2 g q24 (3/7 - 3/13). KEL in March of 2020 negative for vegetations. Recent admission to [facility] with gluteal abscess and fever. At that time, she received vancomycin, piperacillin-tazobactam, and clindamycin in the ED, and was later switched to vancomycin and ceftriaxone. Blood cultures drawn on 6/2 with no growth x5 days. Wound cultures not obtained. HIV and HCV negative on 6/2/2020. Upon current admission, afebrile, HR 90, -147 systolic (one-time BP of 105/50), SpO2 100% RA. Lactic acid on 6.7 was 1.7. There is less of a concern for sepsis currently. Etiology of repeated gluteal abscesses could be related to diarrhea as well as repeated receptive anal sex. Wound cultures growing e coli and enterococcus. Blood cultures NGTD.     - follow-up blood cultures  - follow-up intraoperative wound cultures  - wound care  - IV ampicillin-sulbactam  - general surgery consulted and recommend CRS consult  - CRS consulted with plans for OR on 6/10  - encouragement of anal sex hygiene

## 2020-06-10 NOTE — PROGRESS NOTES
PCT attempt to obtain CBG.  Pt refused and said it is not necessary since she has not eaten and is still NPO.

## 2020-06-10 NOTE — CONSULTS
Pharmacokinetic Assessment Follow Up: IV Vancomycin    Vancomycin serum concentration assessment(s):    The trough level was drawn correctly and can be used to guide therapy at this time. The measurement is within the desired definitive target range of 15 to 20 mcg/mL (it is 14.6, but was drawn late, so likely the trough is higher than result).    Vancomycin Regimen Plan:    Continue regimen to Vancomycin 1250 mg IV every 12 hours with next serum trough concentration measured at 1700 on 6/11    Drug levels (last 3 results):  Recent Labs   Lab Result Units 06/09/20  0620 06/10/20  0854   Vancomycin, Random ug/mL 27.9  --    Vancomycin-Trough ug/mL  --  14.6       Pharmacy will continue to follow and monitor vancomycin.    Please contact pharmacy at extension [phone] for questions regarding this assessment.    Thank you for the consult,   Stella Ruiz       Patient brief summary:  Pratik Torres is a 24 y.o. male initiated on antimicrobial therapy with IV Vancomycin for treatment of skin & soft tissue infection    Drug Allergies:   Review of patient's allergies indicates:   Allergen Reactions    Shrimp        Actual Body Weight:   83 kg    Renal Function:   Estimated Creatinine Clearance: 113.7 mL/min (based on SCr of 1.1 mg/dL).,     Dialysis Method (if applicable):  N/A    CBC (last 72 hours):  Recent Labs   Lab Result Units 06/07/20 2049 06/08/20  1311 06/09/20 0620 06/10/20  0854   WBC K/uL 10.01 8.61  8.61 8.34 8.41   Hemoglobin g/dL 9.0* 9.0*  9.0* 8.9* 8.8*   Hematocrit % 26.8* 27.7*  27.7* 27.9* 27.7*   Platelets K/uL 316 360*  360* 358* 384*   Gran% % 62.0 52.5  52.5 55.2 54.7   Lymph% % 27.0 32.6  32.6 30.2 32.6   Mono% % 9.0 11.8  11.8 11.6 9.8   Eosinophil% % 2.0 1.6  1.6 2.0 2.1   Basophil% % 0.0 0.5  0.5 0.2 0.2   Differential Method  Manual Automated  Automated Automated Automated       Metabolic Panel (last 72 hours):  Recent Labs   Lab Result Units 06/07/20 2049 06/08/20  0754  06/08/20  1311 06/09/20  0620 06/10/20  0854   Sodium mmol/L 131* 132* 133*  133* 132* 132*   Potassium mmol/L 4.1 4.0 4.0  4.0 3.9 3.7   Chloride mmol/L 97 100 101  101 99 98   CO2 mmol/L 24 25 26  26 27 28   Glucose mg/dL 317* 257* 240*  240* 297* 296*   BUN, Bld mg/dL 11 9 9  9 9 8   Creatinine mg/dL 1.4 1.1 1.0  1.0 1.1 1.1   Albumin g/dL 2.4* 2.4* 2.3*  2.3* 2.3* 2.3*   Total Bilirubin mg/dL 0.3 0.3 0.3  0.3 0.2 0.2   Alkaline Phosphatase U/L 104 108 101  101 99 94   AST U/L 17 14 10  10 10 9*   ALT U/L 6* 8* 6*  6* 6* 6*   Magnesium mg/dL  --  1.7 1.7  1.7 1.7 1.7   Phosphorus mg/dL  --  3.8 4.2  4.2 4.0 3.2       Vancomycin Administrations:  vancomycin given in the last 96 hours                   vancomycin 250mg / 10ml oral solution 125 mg (mg) 125 mg Given 06/10/20 1255    vancomycin 1.25 g in dextrose 5% 250 mL IVPB (ready to mix) (mg) 1,250 mg New Bag 06/09/20 1802     1,250 mg New Bag  0603     1,250 mg New Bag 06/08/20 1725                Microbiologic Results:  Microbiology Results (last 7 days)     Procedure Component Value Units Date/Time    Aerobic culture [[id number]]  (Abnormal)  (Susceptibility) Collected:  06/08/20 1302    Order Status:  Completed Specimen:  Abscess Updated:  06/10/20 1231     Aerobic Bacterial Culture ESCHERICHIA COLI  Few        ENTEROCOCCUS GALLINARUM  Moderate      Narrative:       Gluteal region    Blood Culture #2 **CANNOT BE ORDERED STAT** [[id number]] Collected:  06/07/20 2129    Order Status:  Completed Specimen:  Blood Updated:  06/10/20 1012     Blood Culture, Routine No Growth to date      No Growth to date      No Growth to date    Blood Culture #1 **CANNOT BE ORDERED STAT** [[id number]] Collected:  06/07/20 2049    Order Status:  Completed Specimen:  Blood from Peripheral, Forearm, Left Updated:  06/10/20 1012     Blood Culture, Routine No Growth to date      No Growth to date      No Growth to date    C Diff Toxin by PCR [[id number]]  (Abnormal)  Collected:  06/09/20 1916    Order Status:  Completed Updated:  06/10/20 0429     C. diff PCR Positive    Clostridium difficile EIA [[id number]]  (Abnormal) Collected:  06/09/20 1916    Order Status:  Completed Specimen:  Stool Updated:  06/10/20 0341     C. diff Antigen Positive     C difficile Toxins A+B, EIA Negative     Comment: Testing not recommended for children <24 months old.       Stool culture [[id number]] Collected:  06/09/20 1916    Order Status:  Sent Specimen:  Stool Updated:  06/09/20 2028    E. coli 0157 antigen [[id number]] Collected:  06/09/20 1916    Order Status:  No result Specimen:  Stool Updated:  06/09/20 2028

## 2020-06-11 PROBLEM — N17.9 AKI (ACUTE KIDNEY INJURY): Status: ACTIVE | Noted: 2020-06-11

## 2020-06-11 LAB
ALBUMIN SERPL BCP-MCNC: 2.4 G/DL (ref 3.5–5.2)
ALP SERPL-CCNC: 99 U/L (ref 55–135)
ALT SERPL W/O P-5'-P-CCNC: 7 U/L (ref 10–44)
ANION GAP SERPL CALC-SCNC: 6 MMOL/L (ref 8–16)
ANION GAP SERPL CALC-SCNC: 6 MMOL/L (ref 8–16)
AST SERPL-CCNC: 16 U/L (ref 10–40)
B-OH-BUTYR BLD STRIP-SCNC: 0.2 MMOL/L (ref 0–0.5)
BASOPHILS # BLD AUTO: 0.01 K/UL (ref 0–0.2)
BASOPHILS NFR BLD: 0.1 % (ref 0–1.9)
BILIRUB SERPL-MCNC: 0.2 MG/DL (ref 0.1–1)
BUN SERPL-MCNC: 13 MG/DL (ref 6–20)
BUN SERPL-MCNC: 14 MG/DL (ref 6–20)
CALCIUM SERPL-MCNC: 9.2 MG/DL (ref 8.7–10.5)
CALCIUM SERPL-MCNC: 9.2 MG/DL (ref 8.7–10.5)
CHLORIDE SERPL-SCNC: 98 MMOL/L (ref 95–110)
CHLORIDE SERPL-SCNC: 98 MMOL/L (ref 95–110)
CO2 SERPL-SCNC: 27 MMOL/L (ref 23–29)
CO2 SERPL-SCNC: 27 MMOL/L (ref 23–29)
CREAT SERPL-MCNC: 1.5 MG/DL (ref 0.5–1.4)
CREAT SERPL-MCNC: 1.5 MG/DL (ref 0.5–1.4)
DIFFERENTIAL METHOD: ABNORMAL
E COLI SXT1 STL QL IA: NEGATIVE
E COLI SXT2 STL QL IA: NEGATIVE
EOSINOPHIL # BLD AUTO: 0 K/UL (ref 0–0.5)
EOSINOPHIL NFR BLD: 0 % (ref 0–8)
ERYTHROCYTE [DISTWIDTH] IN BLOOD BY AUTOMATED COUNT: 14.3 % (ref 11.5–14.5)
EST. GFR  (AFRICAN AMERICAN): >60 ML/MIN/1.73 M^2
EST. GFR  (AFRICAN AMERICAN): >60 ML/MIN/1.73 M^2
EST. GFR  (NON AFRICAN AMERICAN): >60 ML/MIN/1.73 M^2
EST. GFR  (NON AFRICAN AMERICAN): >60 ML/MIN/1.73 M^2
GLUCOSE SERPL-MCNC: 412 MG/DL (ref 70–110)
GLUCOSE SERPL-MCNC: 413 MG/DL (ref 70–110)
HCT VFR BLD AUTO: 30.7 % (ref 40–54)
HGB BLD-MCNC: 9.6 G/DL (ref 14–18)
IMM GRANULOCYTES # BLD AUTO: 0.06 K/UL (ref 0–0.04)
IMM GRANULOCYTES NFR BLD AUTO: 0.6 % (ref 0–0.5)
LYMPHOCYTES # BLD AUTO: 1.8 K/UL (ref 1–4.8)
LYMPHOCYTES NFR BLD: 16.4 % (ref 18–48)
MAGNESIUM SERPL-MCNC: 1.8 MG/DL (ref 1.6–2.6)
MCH RBC QN AUTO: 26.7 PG (ref 27–31)
MCHC RBC AUTO-ENTMCNC: 31.3 G/DL (ref 32–36)
MCV RBC AUTO: 85 FL (ref 82–98)
MONOCYTES # BLD AUTO: 0.3 K/UL (ref 0.3–1)
MONOCYTES NFR BLD: 3.2 % (ref 4–15)
NEUTROPHILS # BLD AUTO: 8.6 K/UL (ref 1.8–7.7)
NEUTROPHILS NFR BLD: 79.7 % (ref 38–73)
NRBC BLD-RTO: 0 /100 WBC
PHOSPHATE SERPL-MCNC: 4.2 MG/DL (ref 2.7–4.5)
PLATELET # BLD AUTO: 409 K/UL (ref 150–350)
PMV BLD AUTO: 10.9 FL (ref 9.2–12.9)
POCT GLUCOSE: 234 MG/DL (ref 70–110)
POCT GLUCOSE: 248 MG/DL (ref 70–110)
POCT GLUCOSE: 304 MG/DL (ref 70–110)
POCT GLUCOSE: 324 MG/DL (ref 70–110)
POCT GLUCOSE: 352 MG/DL (ref 70–110)
POCT GLUCOSE: 399 MG/DL (ref 70–110)
POCT GLUCOSE: 438 MG/DL (ref 70–110)
POCT GLUCOSE: 442 MG/DL (ref 70–110)
POCT GLUCOSE: 477 MG/DL (ref 70–110)
POTASSIUM SERPL-SCNC: 4.6 MMOL/L (ref 3.5–5.1)
POTASSIUM SERPL-SCNC: 4.7 MMOL/L (ref 3.5–5.1)
PROT SERPL-MCNC: 10.3 G/DL (ref 6–8.4)
RBC # BLD AUTO: 3.6 M/UL (ref 4.6–6.2)
SODIUM SERPL-SCNC: 131 MMOL/L (ref 136–145)
SODIUM SERPL-SCNC: 131 MMOL/L (ref 136–145)
WBC # BLD AUTO: 10.73 K/UL (ref 3.9–12.7)

## 2020-06-11 PROCEDURE — 99233 PR SUBSEQUENT HOSPITAL CARE,LEVL III: ICD-10-PCS | Mod: ,,, | Performed by: INTERNAL MEDICINE

## 2020-06-11 PROCEDURE — 25000003 PHARM REV CODE 250: Performed by: STUDENT IN AN ORGANIZED HEALTH CARE EDUCATION/TRAINING PROGRAM

## 2020-06-11 PROCEDURE — 80048 BASIC METABOLIC PNL TOTAL CA: CPT

## 2020-06-11 PROCEDURE — 99232 PR SUBSEQUENT HOSPITAL CARE,LEVL II: ICD-10-PCS | Mod: ,,, | Performed by: INTERNAL MEDICINE

## 2020-06-11 PROCEDURE — 80053 COMPREHEN METABOLIC PANEL: CPT

## 2020-06-11 PROCEDURE — C9399 UNCLASSIFIED DRUGS OR BIOLOG: HCPCS | Performed by: STUDENT IN AN ORGANIZED HEALTH CARE EDUCATION/TRAINING PROGRAM

## 2020-06-11 PROCEDURE — 83735 ASSAY OF MAGNESIUM: CPT

## 2020-06-11 PROCEDURE — 82010 KETONE BODYS QUAN: CPT

## 2020-06-11 PROCEDURE — 63600175 PHARM REV CODE 636 W HCPCS: Performed by: STUDENT IN AN ORGANIZED HEALTH CARE EDUCATION/TRAINING PROGRAM

## 2020-06-11 PROCEDURE — 99233 SBSQ HOSP IP/OBS HIGH 50: CPT | Mod: ,,, | Performed by: INTERNAL MEDICINE

## 2020-06-11 PROCEDURE — 85025 COMPLETE CBC W/AUTO DIFF WBC: CPT

## 2020-06-11 PROCEDURE — 11000001 HC ACUTE MED/SURG PRIVATE ROOM

## 2020-06-11 PROCEDURE — 99232 SBSQ HOSP IP/OBS MODERATE 35: CPT | Mod: ,,, | Performed by: INTERNAL MEDICINE

## 2020-06-11 PROCEDURE — 36415 COLL VENOUS BLD VENIPUNCTURE: CPT

## 2020-06-11 PROCEDURE — 84100 ASSAY OF PHOSPHORUS: CPT

## 2020-06-11 RX ORDER — INSULIN ASPART 100 [IU]/ML
1-10 INJECTION, SOLUTION INTRAVENOUS; SUBCUTANEOUS
Status: DISCONTINUED | OUTPATIENT
Start: 2020-06-11 | End: 2020-06-12

## 2020-06-11 RX ADMIN — VANCOMYCIN HYDROCHLORIDE 125 MG: KIT at 12:06

## 2020-06-11 RX ADMIN — INSULIN DETEMIR 40 UNITS: 100 INJECTION, SOLUTION SUBCUTANEOUS at 10:06

## 2020-06-11 RX ADMIN — INSULIN ASPART 10 UNITS: 100 INJECTION, SOLUTION INTRAVENOUS; SUBCUTANEOUS at 08:06

## 2020-06-11 RX ADMIN — MORPHINE SULFATE 4 MG: 4 INJECTION INTRAVENOUS at 07:06

## 2020-06-11 RX ADMIN — VANCOMYCIN HYDROCHLORIDE 125 MG: KIT at 11:06

## 2020-06-11 RX ADMIN — AMPICILLIN SODIUM AND SULBACTAM SODIUM 3 G: 2; 1 INJECTION, POWDER, FOR SOLUTION INTRAMUSCULAR; INTRAVENOUS at 12:06

## 2020-06-11 RX ADMIN — SODIUM CHLORIDE 1000 ML: 0.9 INJECTION, SOLUTION INTRAVENOUS at 08:06

## 2020-06-11 RX ADMIN — INSULIN ASPART 4 UNITS: 100 INJECTION, SOLUTION INTRAVENOUS; SUBCUTANEOUS at 10:06

## 2020-06-11 RX ADMIN — ESTRADIOL 1 MG: 1 TABLET ORAL at 08:06

## 2020-06-11 RX ADMIN — ACETAMINOPHEN 650 MG: 325 TABLET ORAL at 08:06

## 2020-06-11 RX ADMIN — VANCOMYCIN HYDROCHLORIDE 125 MG: KIT at 07:06

## 2020-06-11 RX ADMIN — AMPICILLIN SODIUM AND SULBACTAM SODIUM 3 G: 2; 1 INJECTION, POWDER, FOR SOLUTION INTRAMUSCULAR; INTRAVENOUS at 05:06

## 2020-06-11 RX ADMIN — INSULIN ASPART 10 UNITS: 100 INJECTION, SOLUTION INTRAVENOUS; SUBCUTANEOUS at 12:06

## 2020-06-11 RX ADMIN — INSULIN ASPART 4 UNITS: 100 INJECTION, SOLUTION INTRAVENOUS; SUBCUTANEOUS at 07:06

## 2020-06-11 RX ADMIN — SPIRONOLACTONE 25 MG: 25 TABLET, FILM COATED ORAL at 08:06

## 2020-06-11 RX ADMIN — INSULIN ASPART 10 UNITS: 100 INJECTION, SOLUTION INTRAVENOUS; SUBCUTANEOUS at 07:06

## 2020-06-11 RX ADMIN — VANCOMYCIN HYDROCHLORIDE 125 MG: KIT at 06:06

## 2020-06-11 RX ADMIN — AMPICILLIN SODIUM AND SULBACTAM SODIUM 3 G: 2; 1 INJECTION, POWDER, FOR SOLUTION INTRAMUSCULAR; INTRAVENOUS at 06:06

## 2020-06-11 NOTE — TRANSFER OF CARE
Anesthesia Transfer of Care Note    Patient: Pratik Torres    Procedure(s) Performed: Procedure(s) (LRB):  INCISION, ABSCESS, PERIRECTAL (N/A)    Patient location: ICU    Anesthesia Type: general    Transport from OR: Transported from OR on 6-10 L/min O2 by face mask with adequate spontaneous ventilation. Continuous SpO2 monitoring in transport. Continuous ECG monitoring in transport    Post pain: adequate analgesia    Post assessment: no apparent anesthetic complications and tolerated procedure well    Post vital signs: stable    Level of consciousness: sedated    Nausea/Vomiting: no nausea/vomiting    Complications: none    Transfer of care protocol was followed      Last vitals:   Visit Vitals  /85 (BP Location: Left arm, Patient Position: Lying)   Pulse 79   Temp 37.3 °C (99.2 °F) (Oral)   Resp 20   Ht 6' (1.829 m)   Wt 83 kg (182 lb 15.7 oz)   SpO2 98%   BMI 24.82 kg/m²

## 2020-06-11 NOTE — ANESTHESIA POSTPROCEDURE EVALUATION
Anesthesia Post Evaluation    Patient: Pratik Torres    Procedure(s) Performed: Procedure(s) (LRB):  INCISION, ABSCESS, PERIRECTAL (N/A)  DEBRIDEMENT, WOUND ISCHIORECTAL (Right)    Final Anesthesia Type: general    Patient location during evaluation: ICU  Level of consciousness: sedated  Post-procedure vital signs: reviewed and stable  Pain management: adequate  Airway patency: patent    PONV status at discharge: No PONV  Anesthetic complications: no      Cardiovascular status: blood pressure returned to baseline  Respiratory status: spontaneous ventilation and face mask  Hydration status: euvolemic  Follow-up not needed.          Vitals Value Taken Time   /85 6/10/2020  3:38 PM   Temp 37.3 °C (99.2 °F) 6/10/2020  3:38 PM   Pulse 67 6/10/2020  7:22 PM   Resp 20 6/10/2020  3:38 PM   SpO2 100 % 6/10/2020  7:22 PM   Vitals shown include unvalidated device data.      No case tracking events are documented in the log.      Pain/Linda Score: No data recorded

## 2020-06-11 NOTE — ASSESSMENT & PLAN NOTE
Clostridium difficile infection  Reports days with 10 episodes of involuntary loose stools as well as days without any bowel movements. C diff positive.  - PO vancomycin  - ID consulted, appreciate recommendations

## 2020-06-11 NOTE — PLAN OF CARE
06/11/20 1550   Post-Acute Status   Post-Acute Authorization Placement   Post-Acute Placement Status Referrals Sent     SW discussed with treatment team possibility of pt meeting LTAC criteria for discharge plan for wound care and IV Abx.  SW sent referral to [facility] for review.    Ariana Clark LMSW  [facility]  [phone]

## 2020-06-11 NOTE — SUBJECTIVE & OBJECTIVE
Subjective:      Interval History: No acute overnight events. Pain controlled, afebrile    Post-Op Info:  Procedure(s) (LRB):  INCISION, ABSCESS, PERIRECTAL (N/A)  DEBRIDEMENT, WOUND ISCHIORECTAL (Right)   1 Day Post-Op      Medications:  Continuous Infusions:  Scheduled Meds:   ampicillin-sulbactim (UNASYN) IVPB  3 g Intravenous Q6H    estradioL  1 mg Oral Daily    insulin aspart U-100  10 Units Subcutaneous TIDWM    insulin detemir U-100  40 Units Subcutaneous QHS    lidocaine HCL 10 mg/ml (1%)  20 mL Other Once    vancomycin  125 mg Oral Q6H     PRN Meds:   acetaminophen    Dextrose 10% Bolus    Dextrose 10% Bolus    Dextrose 10% Bolus    glucagon (human recombinant)    glucose    glucose    insulin aspart U-100    morphine    sodium chloride 0.9%        Objective:     Vital Signs (Most Recent):  Temp: 97.6 °F (36.4 °C) (06/11/20 0803)  Pulse: 78 (06/11/20 0803)  Resp: 18 (06/11/20 0803)  BP: 125/86 (06/11/20 0803)  SpO2: 100 % (06/11/20 0803) Vital Signs (24h Range):  Temp:  [97 °F (36.1 °C)-99.2 °F (37.3 °C)] 97.6 °F (36.4 °C)  Pulse:  [64-79] 78  Resp:  [17-20] 18  SpO2:  [97 %-100 %] 100 %  BP: (109-128)/(62-86) 125/86     Intake/Output - Last 3 Shifts       06/09 0700 - 06/10 0659 06/10 0700 - 06/11 0659 06/11 0700 - 06/12 0659    P.O. 600  400    I.V. (mL/kg)  600 (7.2)     IV Piggyback 100      Total Intake(mL/kg) 700 (8.3) 600 (7.2) 400 (4.8)    Urine (mL/kg/hr)  2600 (1.3)     Stool  0     Total Output  2600     Net +700 -2000 +400           Urine Occurrence 0 x 3 x     Stool Occurrence 1 x 0 x 1 x          Physical Exam   Constitutional: He is oriented to person, place, and time. He appears well-developed and well-nourished.   HENT:   Head: Normocephalic.   Eyes: Pupils are equal, round, and reactive to light.   Neck: Normal range of motion.   Cardiovascular: Normal rate.   Pulmonary/Chest: Effort normal.   Abdominal: Soft. Bowel sounds are normal. He exhibits no distension.    Musculoskeletal: Normal range of motion.   Neurological: He is alert and oriented to person, place, and time.   Skin: Skin is warm and dry.       Anorectal Exam:  Packing removed from abscess cavity. Clean based, no bleeding or purulence. Tracks to bone posteriorly and superiorly. Wet to dry packing replaced    Significant Labs:  BMP (Last 3 Results):   Recent Labs   Lab 06/09/20  0620 06/10/20  0854 06/11/20  0500   * 296* 412*  413*   * 132* 131*  131*   K 3.9 3.7 4.6  4.7   CL 99 98 98  98   CO2 27 28 27  27   BUN 9 8 14  13   CREATININE 1.1 1.1 1.5*  1.5*   CALCIUM 9.0 9.2 9.2  9.2   MG 1.7 1.7 1.8     CBC (Last 3 Results):   Recent Labs   Lab 06/09/20  0620 06/10/20  0854 06/11/20  0500   WBC 8.34 8.41 10.73   RBC 3.32* 3.33* 3.60*   HGB 8.9* 8.8* 9.6*   HCT 27.9* 27.7* 30.7*   * 384* 409*   MCV 84 83 85   MCH 26.8* 26.4* 26.7*   MCHC 31.9* 31.8* 31.3*     CMP (Last 3 Results):   Recent Labs   Lab 06/09/20  0620 06/10/20  0854 06/11/20  0500   * 296* 412*  413*   CALCIUM 9.0 9.2 9.2  9.2   ALBUMIN 2.3* 2.3* 2.4*   PROT 9.9* 10.3* 10.3*   * 132* 131*  131*   K 3.9 3.7 4.6  4.7   CO2 27 28 27  27   CL 99 98 98  98   BUN 9 8 14  13   CREATININE 1.1 1.1 1.5*  1.5*   ALKPHOS 99 94 99   ALT 6* 6* 7*   AST 10 9* 16   BILITOT 0.2 0.2 0.2     CRP (Last 3 Results):   Recent Labs   Lab 06/08/20  0754 06/10/20  0854   CRP 94.3* 46.4*     Microbiology Results (last 7 days)     Procedure Component Value Units Date/Time    E. coli 0157 antigen [[id number]] Collected:  06/09/20 1916    Order Status:  Completed Specimen:  Stool Updated:  06/11/20 1412     Shiga Toxin 1 E.coli Negative     Shiga Toxin 2 E.coli Negative    Aerobic culture [[id number]] Collected:  06/10/20 1852    Order Status:  Completed Specimen:  Abscess from Buttocks, Right Updated:  06/11/20 1401     Aerobic Bacterial Culture No significant isolate to date    Narrative:       Ischiorectal abcess    AFB Culture  & Smear [[id number]] Collected:  06/10/20 1852    Order Status:  Completed Specimen:  Abscess from Buttocks, Right Updated:  06/11/20 1322     AFB CULTURE STAIN No acid fast bacilli seen.    Narrative:       Ischiorectal abcess    AFB Culture & Smear [[id number]] Collected:  06/10/20 1852    Order Status:  Completed Specimen:  Tissue from Buttocks, Right Updated:  06/11/20 1322     AFB CULTURE STAIN No acid fast bacilli seen.    Narrative:       Right buttock skin and fat    Aerobic culture [[id number]]  (Abnormal) Collected:  06/10/20 1852    Order Status:  Completed Specimen:  Skin from Buttocks, Right Updated:  06/11/20 1300     Aerobic Bacterial Culture ESCHERICHIA COLI  Many  Susceptibility pending  No other significant isolate      Narrative:       Right buttock skin and fat    Fungus culture [[id number]] Collected:  06/10/20 1852    Order Status:  Completed Specimen:  Abscess from Buttocks, Right Updated:  06/11/20 1251     Fungus (Mycology) Culture Culture in progress    Narrative:       Ischiorectal abcess    Fungus culture [[id number]] Collected:  06/10/20 1852    Order Status:  Completed Specimen:  Tissue from Buttocks, Right Updated:  06/11/20 1251     Fungus (Mycology) Culture Culture in progress    Narrative:       Right buttock skin and fat    Blood Culture #2 **CANNOT BE ORDERED STAT** [[id number]] Collected:  06/07/20 2129    Order Status:  Completed Specimen:  Blood Updated:  06/11/20 1012     Blood Culture, Routine No Growth to date      No Growth to date      No Growth to date      No Growth to date    Blood Culture #1 **CANNOT BE ORDERED STAT** [[id number]] Collected:  06/07/20 2049    Order Status:  Completed Specimen:  Blood from Peripheral, Forearm, Left Updated:  06/11/20 1012     Blood Culture, Routine No Growth to date      No Growth to date      No Growth to date      No Growth to date    Culture, Anaerobe [[id number]] Collected:  06/10/20 1852    Order Status:  Completed Specimen:  Abscess from  Buttocks, Right Updated:  06/11/20 0723     Anaerobic Culture Culture in progress    Narrative:       Ischiorectal abcess    Culture, Anaerobe [[id number]] Collected:  06/10/20 1852    Order Status:  Completed Specimen:  Tissue from Buttocks, Right Updated:  06/11/20 0723     Anaerobic Culture Culture in progress    Narrative:       Right buttock skin and fat    Gram stain [[id number]] Collected:  06/10/20 1852    Order Status:  Completed Specimen:  Tissue from Buttocks, Right Updated:  06/10/20 2149     Gram Stain Result Rare WBC's      Many Gram positive cocci      Few Gram positive rods      Rare Gram negative rods    Narrative:       Right buttock skin and fat    Gram stain [[id number]] Collected:  06/10/20 1852    Order Status:  Completed Specimen:  Abscess from Buttocks, Right Updated:  06/10/20 2141     Gram Stain Result Rare WBC's      Rare Gram negative rods      Rare Gram positive cocci    Narrative:       Ischiorectal abcess    Stool culture [[id number]] Collected:  06/09/20 1916    Order Status:  Completed Specimen:  Stool Updated:  06/10/20 1418     Stool Culture Nothing significant to date    Aerobic culture [[id number]]  (Abnormal)  (Susceptibility) Collected:  06/08/20 1302    Order Status:  Completed Specimen:  Abscess Updated:  06/10/20 1231     Aerobic Bacterial Culture ESCHERICHIA COLI  Few        ENTEROCOCCUS GALLINARUM  Moderate      Narrative:       Gluteal region    C Diff Toxin by PCR [[id number]]  (Abnormal) Collected:  06/09/20 1916    Order Status:  Completed Updated:  06/10/20 0429     C. diff PCR Positive    Clostridium difficile EIA [[id number]]  (Abnormal) Collected:  06/09/20 1916    Order Status:  Completed Specimen:  Stool Updated:  06/10/20 0341     C. diff Antigen Positive     C difficile Toxins A+B, EIA Negative     Comment: Testing not recommended for children <24 months old.             Significant Diagnostics:  I have reviewed all pertinent imaging results/findings within the past  24 hours.

## 2020-06-11 NOTE — ASSESSMENT & PLAN NOTE
23 yo MTF transgender female w type 1 diabetes presents with recurring perianal abscess with cavity tracking to rectum on CT, significant devitalized tissue in cavity. Also C Diff positive. S/P debridement of deep abscess cavity on 6/10/20  1. BID wet-to-dry dressing changes. Wound care consult placed. Follow up 1 week after discharge with Dr Santo in colorectal surgery and with Minerva Diaz in outpatient wound care clinic  2. Continue antibiotics. Given depth of abscess, would consider ID consult and possible need for longer course of IV antibiotics.   3. Diarrhea- C Diff tx per primary team.

## 2020-06-11 NOTE — ASSESSMENT & PLAN NOTE
23yo MTF transgender woman with PMH DM I, IBS, previous gluteal abscesses who presents with subjective and objective fevers and gluteal abscess for several days. She has also been experiencing episodes of involuntary diarrhea, which can occur as many as ten times per day,  by days without bowel movements, though she reports this has been ongoing for the past three years since she was diagnosed with IBS. In March of 2020, she had an abscess on her left inner buttock and left medial thigh. Cultures were positive for MRSA, e coli, and prevotella. She was discharged with a PICC line and completed course of vancomycin (3/2 - 3/27, was supposed to complete on 3/29 but discontinued 2 days early due to THEE), metronidazole 500 mg q8 (3/8 - 3/14), and ceftriaxone 2 g q24 (3/7 - 3/13). KEL in March of 2020 negative for vegetations. Recent admission to [facility] with gluteal abscess and fever. At that time, she received vancomycin, piperacillin-tazobactam, and clindamycin in the ED, and was later switched to vancomycin and ceftriaxone. Blood cultures drawn on 6/2 with no growth x5 days. Wound cultures not obtained. HIV and HCV negative on 6/2/2020. Upon current admission, afebrile, HR 90, -147 systolic (one-time BP of 105/50), SpO2 100% RA. Lactic acid on 6.7 was 1.7. There is less of a concern for sepsis currently. Etiology of repeated gluteal abscesses could be related to diarrhea as well as repeated receptive anal sex. Wound cultures growing e coli and enterococcus. Blood cultures NGTD.     - follow-up blood cultures -NGTD  - follow-up intraoperative wound cultures, Gram stain GPC showen  - wound care  - IV ampicillin-sulbactam  - S/P I&D by CRS, wound tracking down to sacrum   - ID consulted   - encouragement of anal sex hygiene

## 2020-06-11 NOTE — PROGRESS NOTES
[facility] - Respiratory ICU  Colorectal Surgery  Progress Note    Patient Name: Pratik Torres  MRN: [id number]  Admission Date: 6/7/2020  Hospital Length of Stay: 3 days  Attending Physician: Flower Saldivar MD    Subjective:      Interval History: No acute overnight events. Pain controlled, afebrile    Post-Op Info:  Procedure(s) (LRB):  INCISION, ABSCESS, PERIRECTAL (N/A)  DEBRIDEMENT, WOUND ISCHIORECTAL (Right)   1 Day Post-Op      Medications:  Continuous Infusions:  Scheduled Meds:   ampicillin-sulbactim (UNASYN) IVPB  3 g Intravenous Q6H    estradioL  1 mg Oral Daily    insulin aspart U-100  10 Units Subcutaneous TIDWM    insulin detemir U-100  40 Units Subcutaneous QHS    lidocaine HCL 10 mg/ml (1%)  20 mL Other Once    vancomycin  125 mg Oral Q6H     PRN Meds:   acetaminophen    Dextrose 10% Bolus    Dextrose 10% Bolus    Dextrose 10% Bolus    glucagon (human recombinant)    glucose    glucose    insulin aspart U-100    morphine    sodium chloride 0.9%        Objective:     Vital Signs (Most Recent):  Temp: 97.6 °F (36.4 °C) (06/11/20 0803)  Pulse: 78 (06/11/20 0803)  Resp: 18 (06/11/20 0803)  BP: 125/86 (06/11/20 0803)  SpO2: 100 % (06/11/20 0803) Vital Signs (24h Range):  Temp:  [97 °F (36.1 °C)-99.2 °F (37.3 °C)] 97.6 °F (36.4 °C)  Pulse:  [64-79] 78  Resp:  [17-20] 18  SpO2:  [97 %-100 %] 100 %  BP: (109-128)/(62-86) 125/86     Intake/Output - Last 3 Shifts       06/09 0700 - 06/10 0659 06/10 0700 - 06/11 0659 06/11 0700 - 06/12 0659    P.O. 600  400    I.V. (mL/kg)  600 (7.2)     IV Piggyback 100      Total Intake(mL/kg) 700 (8.3) 600 (7.2) 400 (4.8)    Urine (mL/kg/hr)  2600 (1.3)     Stool  0     Total Output  2600     Net +700 -2000 +400           Urine Occurrence 0 x 3 x     Stool Occurrence 1 x 0 x 1 x          Physical Exam   Constitutional: He is oriented to person, place, and time. He appears well-developed and well-nourished.   HENT:   Head: Normocephalic.   Eyes:  Pupils are equal, round, and reactive to light.   Neck: Normal range of motion.   Cardiovascular: Normal rate.   Pulmonary/Chest: Effort normal.   Abdominal: Soft. Bowel sounds are normal. He exhibits no distension.   Musculoskeletal: Normal range of motion.   Neurological: He is alert and oriented to person, place, and time.   Skin: Skin is warm and dry.       Anorectal Exam:  Packing removed from abscess cavity. Clean based, no bleeding or purulence. Tracks to bone posteriorly and superiorly. Wet to dry packing replaced    Significant Labs:  BMP (Last 3 Results):   Recent Labs   Lab 06/09/20  0620 06/10/20  0854 06/11/20  0500   * 296* 412*  413*   * 132* 131*  131*   K 3.9 3.7 4.6  4.7   CL 99 98 98  98   CO2 27 28 27  27   BUN 9 8 14  13   CREATININE 1.1 1.1 1.5*  1.5*   CALCIUM 9.0 9.2 9.2  9.2   MG 1.7 1.7 1.8     CBC (Last 3 Results):   Recent Labs   Lab 06/09/20  0620 06/10/20  0854 06/11/20  0500   WBC 8.34 8.41 10.73   RBC 3.32* 3.33* 3.60*   HGB 8.9* 8.8* 9.6*   HCT 27.9* 27.7* 30.7*   * 384* 409*   MCV 84 83 85   MCH 26.8* 26.4* 26.7*   MCHC 31.9* 31.8* 31.3*     CMP (Last 3 Results):   Recent Labs   Lab 06/09/20  0620 06/10/20  0854 06/11/20  0500   * 296* 412*  413*   CALCIUM 9.0 9.2 9.2  9.2   ALBUMIN 2.3* 2.3* 2.4*   PROT 9.9* 10.3* 10.3*   * 132* 131*  131*   K 3.9 3.7 4.6  4.7   CO2 27 28 27  27   CL 99 98 98  98   BUN 9 8 14  13   CREATININE 1.1 1.1 1.5*  1.5*   ALKPHOS 99 94 99   ALT 6* 6* 7*   AST 10 9* 16   BILITOT 0.2 0.2 0.2     CRP (Last 3 Results):   Recent Labs   Lab 06/08/20  0754 06/10/20  0854   CRP 94.3* 46.4*     Microbiology Results (last 7 days)     Procedure Component Value Units Date/Time    E. coli 0157 antigen [[id number]] Collected:  06/09/20 1916    Order Status:  Completed Specimen:  Stool Updated:  06/11/20 1412     Shiga Toxin 1 E.coli Negative     Shiga Toxin 2 E.coli Negative    Aerobic culture [[id number]] Collected:   06/10/20 1852    Order Status:  Completed Specimen:  Abscess from Buttocks, Right Updated:  06/11/20 1401     Aerobic Bacterial Culture No significant isolate to date    Narrative:       Ischiorectal abcess    AFB Culture & Smear [[id number]] Collected:  06/10/20 1852    Order Status:  Completed Specimen:  Abscess from Buttocks, Right Updated:  06/11/20 1322     AFB CULTURE STAIN No acid fast bacilli seen.    Narrative:       Ischiorectal abcess    AFB Culture & Smear [[id number]] Collected:  06/10/20 1852    Order Status:  Completed Specimen:  Tissue from Buttocks, Right Updated:  06/11/20 1322     AFB CULTURE STAIN No acid fast bacilli seen.    Narrative:       Right buttock skin and fat    Aerobic culture [[id number]]  (Abnormal) Collected:  06/10/20 1852    Order Status:  Completed Specimen:  Skin from Buttocks, Right Updated:  06/11/20 1300     Aerobic Bacterial Culture ESCHERICHIA COLI  Many  Susceptibility pending  No other significant isolate      Narrative:       Right buttock skin and fat    Fungus culture [[id number]] Collected:  06/10/20 1852    Order Status:  Completed Specimen:  Abscess from Buttocks, Right Updated:  06/11/20 1251     Fungus (Mycology) Culture Culture in progress    Narrative:       Ischiorectal abcess    Fungus culture [[id number]] Collected:  06/10/20 1852    Order Status:  Completed Specimen:  Tissue from Buttocks, Right Updated:  06/11/20 1251     Fungus (Mycology) Culture Culture in progress    Narrative:       Right buttock skin and fat    Blood Culture #2 **CANNOT BE ORDERED STAT** [[id number]] Collected:  06/07/20 2129    Order Status:  Completed Specimen:  Blood Updated:  06/11/20 1012     Blood Culture, Routine No Growth to date      No Growth to date      No Growth to date      No Growth to date    Blood Culture #1 **CANNOT BE ORDERED STAT** [[id number]] Collected:  06/07/20 2049    Order Status:  Completed Specimen:  Blood from Peripheral, Forearm, Left Updated:  06/11/20 1012      Blood Culture, Routine No Growth to date      No Growth to date      No Growth to date      No Growth to date    Culture, Anaerobe [[id number]] Collected:  06/10/20 1852    Order Status:  Completed Specimen:  Abscess from Buttocks, Right Updated:  06/11/20 0723     Anaerobic Culture Culture in progress    Narrative:       Ischiorectal abcess    Culture, Anaerobe [[id number]] Collected:  06/10/20 1852    Order Status:  Completed Specimen:  Tissue from Buttocks, Right Updated:  06/11/20 0723     Anaerobic Culture Culture in progress    Narrative:       Right buttock skin and fat    Gram stain [[id number]] Collected:  06/10/20 1852    Order Status:  Completed Specimen:  Tissue from Buttocks, Right Updated:  06/10/20 2149     Gram Stain Result Rare WBC's      Many Gram positive cocci      Few Gram positive rods      Rare Gram negative rods    Narrative:       Right buttock skin and fat    Gram stain [[id number]] Collected:  06/10/20 1852    Order Status:  Completed Specimen:  Abscess from Buttocks, Right Updated:  06/10/20 2141     Gram Stain Result Rare WBC's      Rare Gram negative rods      Rare Gram positive cocci    Narrative:       Ischiorectal abcess    Stool culture [[id number]] Collected:  06/09/20 1916    Order Status:  Completed Specimen:  Stool Updated:  06/10/20 1418     Stool Culture Nothing significant to date    Aerobic culture [[id number]]  (Abnormal)  (Susceptibility) Collected:  06/08/20 1302    Order Status:  Completed Specimen:  Abscess Updated:  06/10/20 1231     Aerobic Bacterial Culture ESCHERICHIA COLI  Few        ENTEROCOCCUS GALLINARUM  Moderate      Narrative:       Gluteal region    C Diff Toxin by PCR [[id number]]  (Abnormal) Collected:  06/09/20 1916    Order Status:  Completed Updated:  06/10/20 0429     C. diff PCR Positive    Clostridium difficile EIA [[id number]]  (Abnormal) Collected:  06/09/20 1916    Order Status:  Completed Specimen:  Stool Updated:  06/10/20 0341     C. diff Antigen  Positive     C difficile Toxins A+B, EIA Negative     Comment: Testing not recommended for children <24 months old.             Significant Diagnostics:  I have reviewed all pertinent imaging results/findings within the past 24 hours.    Assessment/Plan:     Gluteal abscess  23 yo MTF transgender female w type 1 diabetes presents with recurring perianal abscess with cavity tracking to rectum on CT, significant devitalized tissue in cavity. Also C Diff positive. S/P debridement of deep abscess cavity on 6/10/20  1. BID wet-to-dry dressing changes. Wound care consult placed. Follow up 1 week after discharge with Dr Santo in colorectal surgery and with Minerva Diaz in outpatient wound care clinic  2. Continue antibiotics. Given depth of abscess, would consider ID consult and possible need for longer course of IV antibiotics.   3. Diarrhea- C Diff tx per primary team.          Jae Reyes MD  Colorectal Surgery  [facility] - Respiratory ICU

## 2020-06-11 NOTE — ASSESSMENT & PLAN NOTE
23yo MTF transgender woman with PMH DM I, IBS, previous gluteal abscesses who presents with subjective and objective fevers and gluteal abscess for several days. She has also been experiencing episodes of involuntary diarrhea, which can occur as many as ten times per day,  by days without bowel movements, though she reports this has been ongoing for the past three years since she was diagnosed with IBS. In March of 2020, she had an abscess on her left inner buttock and left medial thigh. Cultures were positive for MRSA, e coli, and prevotella. She was discharged with a PICC line and completed course of vancomycin (3/2 - 3/27, was supposed to complete on 3/29 but discontinued 2 days early due to THEE), metronidazole 500 mg q8 (3/8 - 3/14), and ceftriaxone 2 g q24 (3/7 - 3/13). KEL in March of 2020 negative for vegetations. Recent admission to [facility] with gluteal abscess and fever. At that time, she received vancomycin, piperacillin-tazobactam, and clindamycin in the ED, and was later switched to vancomycin and ceftriaxone. Blood cultures drawn on 6/2 with no growth x5 days. Wound cultures not obtained. HIV and HCV negative on 6/2/2020. Upon current admission, afebrile, HR 90, -147 systolic (one-time BP of 105/50), SpO2 100% RA. Lactic acid on 6.7 was 1.7. There is less of a concern for sepsis currently. Etiology of repeated gluteal abscesses could be related to diarrhea as well as repeated receptive anal sex.     - follow-up blood cultures -NGTD  - follow-up wound cultures - Enterococcus and Ecoli that sensitive to Unyson.   - wound care  - continue IV Unyson  - S/p CRS debribement.   - encouragement of anal sex hygiene

## 2020-06-11 NOTE — ASSESSMENT & PLAN NOTE
Takes glargine 40 units daily and aspart 10 TID with meals. Recent admission to [facility] ICU for DKA. Upon this admission, beta-hydroxybutyrate normal without anion gap. Blood sugar 247.  - diabetic diet  - POCT BG ACHS  - goal -180 while inpatient  - insulin detemir 40 units QHS  - aspart 10 TID with meals  - moderate dose sliding scale insulin PRN  - titrate insulin as appropriate

## 2020-06-11 NOTE — NURSING
When this nurse attempted to obtain evening vital signs, administer oral vancomycin and IV ampicillin, and give dinner time insulin and provide patient with meal the patient yelled at this nurse to get out of the room and that she was trying to sleep and she would do it later. Patient is also refusing to allow me to replace bed side monitoring system. Medicine team aware.

## 2020-06-11 NOTE — SUBJECTIVE & OBJECTIVE
Interval History: S/P I&D of right butt abscess. Patient is not in pain and comfortable. Patient notified that she will be moving room since patient does not have symptoms from COVID-19.     Review of Systems   Constitutional: Negative for activity change, chills and fever.   Eyes: Negative for photophobia and visual disturbance.   Respiratory: Negative for cough, chest tightness and shortness of breath.    Cardiovascular: Negative for chest pain and leg swelling.   Gastrointestinal: Positive for diarrhea. Negative for abdominal pain, blood in stool, constipation, nausea and vomiting.        Perirectal pain   Endocrine: Negative for polydipsia and polyuria.   Genitourinary: Negative for difficulty urinating and dysuria.   Musculoskeletal: Negative for gait problem and myalgias.   Neurological: Negative for syncope and headaches.   Psychiatric/Behavioral: Negative for agitation and confusion.     Objective:     Vital Signs (Most Recent):  Temp: 97.6 °F (36.4 °C) (06/11/20 0803)  Pulse: 78 (06/11/20 0803)  Resp: 18 (06/11/20 0803)  BP: 125/86 (06/11/20 0803)  SpO2: 100 % (06/11/20 0803) Vital Signs (24h Range):  Temp:  [97 °F (36.1 °C)-99.2 °F (37.3 °C)] 97.6 °F (36.4 °C)  Pulse:  [64-79] 78  Resp:  [17-20] 18  SpO2:  [97 %-100 %] 100 %  BP: (109-128)/(62-86) 125/86     Weight: 83 kg (182 lb 15.7 oz)  Body mass index is 24.82 kg/m².    Intake/Output Summary (Last 24 hours) at 6/11/2020 1228  Last data filed at 6/11/2020 0900  Gross per 24 hour   Intake 1000 ml   Output 1800 ml   Net -800 ml      Physical Exam   Constitutional: He is oriented to person, place, and time. He appears well-developed and well-nourished. No distress.   HENT:   Head: Normocephalic and atraumatic.   Mouth/Throat: Oropharynx is clear and moist.   Eyes: Pupils are equal, round, and reactive to light. EOM are normal. No scleral icterus.   Neck: Normal range of motion. Neck supple.   Cardiovascular: Normal rate, regular rhythm, normal heart sounds  and intact distal pulses.   No murmur heard.  Pulmonary/Chest: Effort normal. No stridor. No respiratory distress. He has no wheezes.   Abdominal: Soft. Bowel sounds are normal. He exhibits no distension. There is no tenderness.   Genitourinary:   Genitourinary Comments: ~6 cm open wound right buttock packed, clean dry intact.    Musculoskeletal: Normal range of motion. He exhibits no edema or tenderness.   Neurological: He is alert and oriented to person, place, and time.   Skin: Skin is warm and dry. He is not diaphoretic.       Significant Labs:   CBC:   Recent Labs   Lab 06/10/20  0854 06/11/20  0500   WBC 8.41 10.73   HGB 8.8* 9.6*   HCT 27.7* 30.7*   * 409*     CMP:   Recent Labs   Lab 06/10/20  0854 06/11/20  0500   * 131*  131*   K 3.7 4.6  4.7   CL 98 98  98   CO2 28 27  27   * 412*  413*   BUN 8 14  13   CREATININE 1.1 1.5*  1.5*   CALCIUM 9.2 9.2  9.2   PROT 10.3* 10.3*   ALBUMIN 2.3* 2.4*   BILITOT 0.2 0.2   ALKPHOS 94 99   AST 9* 16   ALT 6* 7*   ANIONGAP 6* 6*  6*   EGFRNONAA >60.0 >60.0  >60.0     All pertinent labs within the past 24 hours have been reviewed.    Significant Imaging: I have reviewed all pertinent imaging results/findings within the past 24 hours.

## 2020-06-11 NOTE — PROGRESS NOTES
[facility] - Respiratory ICU  Hospital Medicine  Progress Note    Patient Name: Pratik Torres  MRN: [id number]  Patient Class: IP- Inpatient   Admission Date: 6/7/2020  Length of Stay: 3 days  Attending Physician: Flower Saldivar MD  Primary Care Provider: Felipe Summers MD        Subjective:     Principal Problem:Perirectal abscess        HPI:  Odessa Torres is a 24-year-old MTF transgender woman with PMH DM I, IBS, previous gluteal abscesses who presents with subjective and objective fevers and gluteal abscess for several days. She reports the abscess on her right buttock is tender only to palpation. She has also been experiencing episodes of involuntary diarrhea, which can occur as many as ten times per day,  by days without bowel movements, though she reports this has been ongoing for the past three years since she was diagnosed with IBS. She denies chest pain, shortness of breath, nausea, vomiting, myalgias, cough.     In March of 2020, she had an abscess on her left inner buttock and left medial thigh. Cultures were positive for MRSA, e coli, and prevotella. She was discharged with a PICC line and completed course of vancomycin (3/2 - 3/27, was supposed to complete on 3/29 but discontinued 2 days early due to THEE), metronidazole 500 mg q8 (3/8 - 3/14), and ceftriaxone 2 g q24 (3/7 - 3/13). KEL in March of 2020 negative for vegetations.     She was diagnosed with COVID-19 in April of 2020, at which point she was also treated with ceftriaxone for five days for klebsiella UTI.     She was recently admitted to [facility] in [place], where she presented with gluteal abscess and fever (101.9 in ED), during which she was admitted to the ICU for DKA and sepsis presumed to be secondary to gluteal abscess. At that time, she received vancomycin, piperacillin-tazobactam, and clindamycin in the ED, and was later switched to vancomycin and ceftriaxone. Blood cultures drawn on  "6/2 with no growth x5 days. Wound cultures not obtained. HIV and HCV negative on 6/2/2020. Patient was evaluated by surgical team, who recommended further debridement in the OR, however, there was concern regarding positive COVID status. Per patient, left AMA due to dissatisfaction with her hospital experience. Per patient, was repeatedly referred to as "Ms. Saxena," rather than "Odessa," felt like people were talking about her disrespectfully in the hallways outside of her room, and was not given food for a prolonged period of time while waiting to undergo surgery, which had to be rescheduled. She then represented to the ED at [facility] for continued fevers.     Odessa does not smoke cigarettes, she drinks alcohol only at parties at most several days out of the month, has consumed edible marijuana, has used black marva, denies cocaine, heroin, or any IV drug use. She lived at home with her family in [facility] [place], though reports yesterday she became homeless as her family kicked her out. 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 She identifies as female (pronouns she, her), has oral and anal sex with men, uses condoms consistently, has a history of treated syphilis, and denies history of any other STIs, and reports no sexual activity in the past four months. She has received PrEP previously. She follows at [facility] for her estradiol and spironolactone. She takes glargine 40 units once daily and aspart 10 TID with meals. Denies allergies to medications, though shrimp makes her throat close up.     Upon admission, afebrile, HR 90, -147 systolic (one-time BP of 105/50), SpO2 100% RA. Hemoglobin stable at 9, creatinine 1, anion gap 6, beta-hydroxybutyrate 0.2, . Elevated inflammatory markers. COVID positive day prior.     Overview/Hospital Course:  Admitted to hospital medicine on 6/8 for perirectal abscess associated with fevers. Started on IV vancomycin, ceftriaxone, and metronidazole. CT abdomen/pelvis " on 6/8 showed 7 cm right perirectal abscess communicating with the skin surface. General surgery consulted and recommended colorectal surgery consult. CRS plan to take to OR on 6/10. Started basal-bolus insulin regimen and continued home estradiol and spironolactone. Ceftriaxone switched to cefepime given history of diabetes and desire to provide pseudomonal coverage. C diff positive so started PO vancomycin. Wound culture growing e coli and enterococcus. Changed IV antibiotics to ampicillin-sulbactam. S/P gluteal abscess I&D seen pus tracking down to sacrum. CRS believes that she may need flap down the road with plastic surgery. Patient had bump of Cr 1.1-> 1.5 held [place] and gave 1L fluid.     Interval History: S/P I&D of right butt abscess. Patient is not in pain and comfortable. Patient notified that she will be moving room since patient does not have symptoms from COVID-19.     Review of Systems   Constitutional: Negative for activity change, chills and fever.   Eyes: Negative for photophobia and visual disturbance.   Respiratory: Negative for cough, chest tightness and shortness of breath.    Cardiovascular: Negative for chest pain and leg swelling.   Gastrointestinal: Positive for diarrhea. Negative for abdominal pain, blood in stool, constipation, nausea and vomiting.        Perirectal pain   Endocrine: Negative for polydipsia and polyuria.   Genitourinary: Negative for difficulty urinating and dysuria.   Musculoskeletal: Negative for gait problem and myalgias.   Neurological: Negative for syncope and headaches.   Psychiatric/Behavioral: Negative for agitation and confusion.     Objective:     Vital Signs (Most Recent):  Temp: 97.6 °F (36.4 °C) (06/11/20 0803)  Pulse: 78 (06/11/20 0803)  Resp: 18 (06/11/20 0803)  BP: 125/86 (06/11/20 0803)  SpO2: 100 % (06/11/20 0803) Vital Signs (24h Range):  Temp:  [97 °F (36.1 °C)-99.2 °F (37.3 °C)] 97.6 °F (36.4 °C)  Pulse:  [64-79] 78  Resp:  [17-20] 18  SpO2:  [97  %-100 %] 100 %  BP: (109-128)/(62-86) 125/86     Weight: 83 kg (182 lb 15.7 oz)  Body mass index is 24.82 kg/m².    Intake/Output Summary (Last 24 hours) at 6/11/2020 1228  Last data filed at 6/11/2020 0900  Gross per 24 hour   Intake 1000 ml   Output 1800 ml   Net -800 ml      Physical Exam   Constitutional: He is oriented to person, place, and time. He appears well-developed and well-nourished. No distress.   HENT:   Head: Normocephalic and atraumatic.   Mouth/Throat: Oropharynx is clear and moist.   Eyes: Pupils are equal, round, and reactive to light. EOM are normal. No scleral icterus.   Neck: Normal range of motion. Neck supple.   Cardiovascular: Normal rate, regular rhythm, normal heart sounds and intact distal pulses.   No murmur heard.  Pulmonary/Chest: Effort normal. No stridor. No respiratory distress. He has no wheezes.   Abdominal: Soft. Bowel sounds are normal. He exhibits no distension. There is no tenderness.   Genitourinary:   Genitourinary Comments: ~6 cm open wound right buttock packed, clean dry intact.    Musculoskeletal: Normal range of motion. He exhibits no edema or tenderness.   Neurological: He is alert and oriented to person, place, and time.   Skin: Skin is warm and dry. He is not diaphoretic.       Significant Labs:   CBC:   Recent Labs   Lab 06/10/20  0854 06/11/20  0500   WBC 8.41 10.73   HGB 8.8* 9.6*   HCT 27.7* 30.7*   * 409*     CMP:   Recent Labs   Lab 06/10/20  0854 06/11/20  0500   * 131*  131*   K 3.7 4.6  4.7   CL 98 98  98   CO2 28 27  27   * 412*  413*   BUN 8 14  13   CREATININE 1.1 1.5*  1.5*   CALCIUM 9.2 9.2  9.2   PROT 10.3* 10.3*   ALBUMIN 2.3* 2.4*   BILITOT 0.2 0.2   ALKPHOS 94 99   AST 9* 16   ALT 6* 7*   ANIONGAP 6* 6*  6*   EGFRNONAA >60.0 >60.0  >60.0     All pertinent labs within the past 24 hours have been reviewed.    Significant Imaging: I have reviewed all pertinent imaging results/findings within the past 24  hours.      Assessment/Plan:      * Perirectal abscess  23yo MTF transgender woman with PMH DM I, IBS, previous gluteal abscesses who presents with subjective and objective fevers and gluteal abscess for several days. She has also been experiencing episodes of involuntary diarrhea, which can occur as many as ten times per day,  by days without bowel movements, though she reports this has been ongoing for the past three years since she was diagnosed with IBS. In March of 2020, she had an abscess on her left inner buttock and left medial thigh. Cultures were positive for MRSA, e coli, and prevotella. She was discharged with a PICC line and completed course of vancomycin (3/2 - 3/27, was supposed to complete on 3/29 but discontinued 2 days early due to THEE), metronidazole 500 mg q8 (3/8 - 3/14), and ceftriaxone 2 g q24 (3/7 - 3/13). KEL in March of 2020 negative for vegetations. Recent admission to [facility] with gluteal abscess and fever. At that time, she received vancomycin, piperacillin-tazobactam, and clindamycin in the ED, and was later switched to vancomycin and ceftriaxone. Blood cultures drawn on 6/2 with no growth x5 days. Wound cultures not obtained. HIV and HCV negative on 6/2/2020. Upon current admission, afebrile, HR 90, -147 systolic (one-time BP of 105/50), SpO2 100% RA. Lactic acid on 6.7 was 1.7. There is less of a concern for sepsis currently. Etiology of repeated gluteal abscesses could be related to diarrhea as well as repeated receptive anal sex. Wound cultures growing e coli and enterococcus. Blood cultures NGTD.     - follow-up blood cultures -NGTD  - follow-up intraoperative wound cultures, Gram stain GPC showen  - wound care  - IV ampicillin-sulbactam  - S/P I&D by CRS, wound tracking down to sacrum   - ID consulted   - encouragement of anal sex hygiene        THEE (acute kidney injury)  CMP on the 6/11 showed Cr elevation of 1.1->1.5. Possible effect from procedure he has yesterday.  Patient does not appear to be septic.     Plan  - Continue trend CMP  - 1L NS bolus   - Holding spirolactone     Anemia  Hemoglobin stable at 9 (12.2 in November of 2019). No active signs of bleeding. Normal pulse and normotensive. Iron studies consistent with iron deficiency anemia.  - consider supplementation upon discharge       COVID-19 virus detected  SpO2 100% on RA.   - COVID-19 testing Collection Date: 6/7/2020 Collection Time:  11:33 AM  - Infection Control notified      - Isolation:   - Airborne, Contact and Droplet Precautions  - Cohort patients into COVID units  - N95 masks must be fit tested, wear eye protection  - 20 second hand hygiene              - Limit visitors per hospital policy              - Consolidating lab draws, nursing care, provider visits, and interventions    - Diagnostics: (leukopenia, hyponatremia, hyperferritinemia, elevated troponin, elevated d-dimer, age, and comorbidities are significant predictors of poor clinical outcome)  CBC and CMP    - Management:  Supplemental O2 to maintain SpO2 >92%  Continuous/intermittent Pulse Ox  Albuterol treatment PRN            Gluteal abscess  25yo MTF transgender woman with PMH DM I, IBS, previous gluteal abscesses who presents with subjective and objective fevers and gluteal abscess for several days. She has also been experiencing episodes of involuntary diarrhea, which can occur as many as ten times per day,  by days without bowel movements, though she reports this has been ongoing for the past three years since she was diagnosed with IBS. In March of 2020, she had an abscess on her left inner buttock and left medial thigh. Cultures were positive for MRSA, e coli, and prevotella. She was discharged with a PICC line and completed course of vancomycin (3/2 - 3/27, was supposed to complete on 3/29 but discontinued 2 days early due to THEE), metronidazole 500 mg q8 (3/8 - 3/14), and ceftriaxone 2 g q24 (3/7 - 3/13). KEL in March of 2020  negative for vegetations. Recent admission to [facility] with gluteal abscess and fever. At that time, she received vancomycin, piperacillin-tazobactam, and clindamycin in the ED, and was later switched to vancomycin and ceftriaxone. Blood cultures drawn on 6/2 with no growth x5 days. Wound cultures not obtained. HIV and HCV negative on 6/2/2020. Upon current admission, afebrile, HR 90, -147 systolic (one-time BP of 105/50), SpO2 100% RA. Lactic acid on 6.7 was 1.7. There is less of a concern for sepsis currently. Etiology of repeated gluteal abscesses could be related to diarrhea as well as repeated receptive anal sex.     - follow-up blood cultures -NGTD  - follow-up wound cultures - Enterococcus and Ecoli that sensitive to Unyson.   - wound care  - continue IV Unyson  - S/p CRS debribement.   - encouragement of anal sex hygiene    Diarrhea  Clostridium difficile infection  Reports days with 10 episodes of involuntary loose stools as well as days without any bowel movements. C diff positive.  - PO vancomycin  - ID consulted, appreciate recommendations      Type 1 diabetes mellitus with hyperglycemia  Takes glargine 40 units daily and aspart 10 TID with meals. Recent admission to [facility] ICU for DKA. Upon this admission, beta-hydroxybutyrate normal without anion gap. Blood sugar 247.  - diabetic diet  - POCT BG ACHS  - goal -180 while inpatient  - insulin detemir 40 units QHS  - aspart 10 TID with meals  - moderate dose sliding scale insulin PRN  - titrate insulin as appropriate        VTE Risk Mitigation (From admission, onward)         Ordered     Place sequential compression device  Until discontinued      06/08/20 0719     IP VTE HIGH RISK PATIENT  Once      06/08/20 0658                MARQUISE Rai MD  Department of Hospital Medicine   [facility] - [facility] ICU

## 2020-06-11 NOTE — NURSING
BG @HS 248ml/dL, insulin given per ordered sliding scale. Patient had refused dinner insulin and received diet after procedure. Rechecked BG 438mg/dL. MD made aware with orders to recheck at a later time. 10u aspart given prior to additional meal with subsequent CBG continued to be elevated at 442. MD ordered labs, new orders to be received upon lab results. Lab notified writer this AM stating unable to utilize do Beta - Hydroxybutyrate, Serum lab d/t time received. MD paged

## 2020-06-11 NOTE — PLAN OF CARE
Problem: Adult Inpatient Plan of Care  Goal: Plan of Care Review  Outcome: Ongoing, Progressing   Patient turned and repositioned self independently, utilizing prone position. Continues with right gluteal wound, dressing intact. Patient pain and safety monitored q 1-2 hrs this shift. Blood glucose monitored throughout shift. BG monitored t/o shift. Bed locked and in lowest position. Bed side rails elevated x2. Call light and personal belongings in reach. Continues on ABT, no a/e noted. MD notified via secure chat. Remains on contact, airborne, and droplet precautions. Will cont. to monitor

## 2020-06-11 NOTE — PHYSICIAN QUERY
PT Name: Pratik Torres  MR #: [id number]     Physician Query Form - Documentation Clarification      CDS/: Stefania Prabhakar RN, CDS                Contact information: [email or web address]    This form is a permanent document in the medical record.     Query Date: June 11, 2020    By submitting this query, we are merely seeking further clarification of documentation. Please utilize your independent clinical judgment when addressing the question(s) below.    The Medical record reflects the following:    Supporting Clinical Findings Location in Medical Record   Recent COVID-19 virus infection - no respiratory symptoms   H&P 6/8   Active Hospital Problems   COVID-19 virus detected    H&P 6/8   She was diagnosed with COVID-19 in April of 2020   H&P 6/8    Patient is COVID negative here, but given that I am able to view the COVID positive result from this morning at [facility]   ED Provider Note 6/7   She tested positive for COVID and facility seeking transfer due to COVID status.      Plan of Care 6/7                                                                             Doctor, please clarify the patient's COVID status     Provider Use Only     [   ] Patient is currently COVID 19 positive   [ x  ] Patient has recent history of COVID 19 and no longer has an active COVID 19 infection   [   ] Other clarification, please specify ________________                                                                                                           [  ] Clinically Undetermined

## 2020-06-12 LAB
ALBUMIN SERPL BCP-MCNC: 2.5 G/DL (ref 3.5–5.2)
ALP SERPL-CCNC: 92 U/L (ref 55–135)
ALT SERPL W/O P-5'-P-CCNC: 34 U/L (ref 10–44)
ANION GAP SERPL CALC-SCNC: 7 MMOL/L (ref 8–16)
AST SERPL-CCNC: 12 U/L (ref 10–40)
BACTERIA SPEC AEROBE CULT: ABNORMAL
BASOPHILS # BLD AUTO: 0.02 K/UL (ref 0–0.2)
BASOPHILS NFR BLD: 0.2 % (ref 0–1.9)
BILIRUB SERPL-MCNC: 0.2 MG/DL (ref 0.1–1)
BUN SERPL-MCNC: 12 MG/DL (ref 6–20)
CALCIUM SERPL-MCNC: 9.3 MG/DL (ref 8.7–10.5)
CHLORIDE SERPL-SCNC: 102 MMOL/L (ref 95–110)
CO2 SERPL-SCNC: 25 MMOL/L (ref 23–29)
CREAT SERPL-MCNC: 1.2 MG/DL (ref 0.5–1.4)
CRP SERPL-MCNC: 17.9 MG/L (ref 0–8.2)
D DIMER PPP IA.FEU-MCNC: 3.27 MG/L FEU
DIFFERENTIAL METHOD: ABNORMAL
EOSINOPHIL # BLD AUTO: 0.2 K/UL (ref 0–0.5)
EOSINOPHIL NFR BLD: 2.1 % (ref 0–8)
ERYTHROCYTE [DISTWIDTH] IN BLOOD BY AUTOMATED COUNT: 14.4 % (ref 11.5–14.5)
EST. GFR  (AFRICAN AMERICAN): >60 ML/MIN/1.73 M^2
EST. GFR  (NON AFRICAN AMERICAN): >60 ML/MIN/1.73 M^2
FERRITIN SERPL-MCNC: 262 NG/ML (ref 20–300)
GLUCOSE SERPL-MCNC: 306 MG/DL (ref 70–110)
HCT VFR BLD AUTO: 31.1 % (ref 40–54)
HGB BLD-MCNC: 9.7 G/DL (ref 14–18)
IMM GRANULOCYTES # BLD AUTO: 0.04 K/UL (ref 0–0.04)
IMM GRANULOCYTES NFR BLD AUTO: 0.5 % (ref 0–0.5)
LDH SERPL L TO P-CCNC: 133 U/L (ref 110–260)
LYMPHOCYTES # BLD AUTO: 3.6 K/UL (ref 1–4.8)
LYMPHOCYTES NFR BLD: 44.7 % (ref 18–48)
MAGNESIUM SERPL-MCNC: 1.8 MG/DL (ref 1.6–2.6)
MCH RBC QN AUTO: 26.6 PG (ref 27–31)
MCHC RBC AUTO-ENTMCNC: 31.2 G/DL (ref 32–36)
MCV RBC AUTO: 85 FL (ref 82–98)
MONOCYTES # BLD AUTO: 0.5 K/UL (ref 0.3–1)
MONOCYTES NFR BLD: 6.1 % (ref 4–15)
NEUTROPHILS # BLD AUTO: 3.7 K/UL (ref 1.8–7.7)
NEUTROPHILS NFR BLD: 46.4 % (ref 38–73)
NRBC BLD-RTO: 0 /100 WBC
O+P STL MICRO: NORMAL
PHOSPHATE SERPL-MCNC: 2.9 MG/DL (ref 2.7–4.5)
PLATELET # BLD AUTO: 414 K/UL (ref 150–350)
PMV BLD AUTO: 10.3 FL (ref 9.2–12.9)
POCT GLUCOSE: 168 MG/DL (ref 70–110)
POCT GLUCOSE: 169 MG/DL (ref 70–110)
POCT GLUCOSE: 305 MG/DL (ref 70–110)
POCT GLUCOSE: 326 MG/DL (ref 70–110)
POTASSIUM SERPL-SCNC: 3.4 MMOL/L (ref 3.5–5.1)
PROT SERPL-MCNC: 10.6 G/DL (ref 6–8.4)
RBC # BLD AUTO: 3.64 M/UL (ref 4.6–6.2)
SODIUM SERPL-SCNC: 134 MMOL/L (ref 136–145)
WBC # BLD AUTO: 8.07 K/UL (ref 3.9–12.7)

## 2020-06-12 PROCEDURE — 82728 ASSAY OF FERRITIN: CPT

## 2020-06-12 PROCEDURE — 83615 LACTATE (LD) (LDH) ENZYME: CPT

## 2020-06-12 PROCEDURE — 83735 ASSAY OF MAGNESIUM: CPT

## 2020-06-12 PROCEDURE — 85025 COMPLETE CBC W/AUTO DIFF WBC: CPT

## 2020-06-12 PROCEDURE — 80053 COMPREHEN METABOLIC PANEL: CPT

## 2020-06-12 PROCEDURE — 85379 FIBRIN DEGRADATION QUANT: CPT

## 2020-06-12 PROCEDURE — 99232 PR SUBSEQUENT HOSPITAL CARE,LEVL II: ICD-10-PCS | Mod: ,,, | Performed by: INTERNAL MEDICINE

## 2020-06-12 PROCEDURE — 94761 N-INVAS EAR/PLS OXIMETRY MLT: CPT

## 2020-06-12 PROCEDURE — 11000001 HC ACUTE MED/SURG PRIVATE ROOM

## 2020-06-12 PROCEDURE — 25000003 PHARM REV CODE 250: Performed by: STUDENT IN AN ORGANIZED HEALTH CARE EDUCATION/TRAINING PROGRAM

## 2020-06-12 PROCEDURE — 86140 C-REACTIVE PROTEIN: CPT

## 2020-06-12 PROCEDURE — 84100 ASSAY OF PHOSPHORUS: CPT

## 2020-06-12 PROCEDURE — C9399 UNCLASSIFIED DRUGS OR BIOLOG: HCPCS | Performed by: STUDENT IN AN ORGANIZED HEALTH CARE EDUCATION/TRAINING PROGRAM

## 2020-06-12 PROCEDURE — 99232 SBSQ HOSP IP/OBS MODERATE 35: CPT | Mod: ,,, | Performed by: INTERNAL MEDICINE

## 2020-06-12 PROCEDURE — 63600175 PHARM REV CODE 636 W HCPCS: Performed by: STUDENT IN AN ORGANIZED HEALTH CARE EDUCATION/TRAINING PROGRAM

## 2020-06-12 PROCEDURE — 36415 COLL VENOUS BLD VENIPUNCTURE: CPT

## 2020-06-12 RX ORDER — INSULIN ASPART 100 [IU]/ML
15 INJECTION, SOLUTION INTRAVENOUS; SUBCUTANEOUS
Status: DISCONTINUED | OUTPATIENT
Start: 2020-06-12 | End: 2020-06-14

## 2020-06-12 RX ORDER — INSULIN ASPART 100 [IU]/ML
1-10 INJECTION, SOLUTION INTRAVENOUS; SUBCUTANEOUS
Status: DISCONTINUED | OUTPATIENT
Start: 2020-06-12 | End: 2020-06-14 | Stop reason: HOSPADM

## 2020-06-12 RX ORDER — POTASSIUM CHLORIDE 20 MEQ/15ML
20 SOLUTION ORAL ONCE
Status: DISCONTINUED | OUTPATIENT
Start: 2020-06-12 | End: 2020-06-14 | Stop reason: HOSPADM

## 2020-06-12 RX ORDER — ENOXAPARIN SODIUM 100 MG/ML
40 INJECTION SUBCUTANEOUS EVERY 24 HOURS
Status: DISCONTINUED | OUTPATIENT
Start: 2020-06-12 | End: 2020-06-14 | Stop reason: HOSPADM

## 2020-06-12 RX ADMIN — VANCOMYCIN HYDROCHLORIDE 125 MG: KIT at 05:06

## 2020-06-12 RX ADMIN — VANCOMYCIN HYDROCHLORIDE 125 MG: KIT at 06:06

## 2020-06-12 RX ADMIN — AMPICILLIN SODIUM AND SULBACTAM SODIUM 3 G: 2; 1 INJECTION, POWDER, FOR SOLUTION INTRAMUSCULAR; INTRAVENOUS at 11:06

## 2020-06-12 RX ADMIN — VANCOMYCIN HYDROCHLORIDE 125 MG: KIT at 01:06

## 2020-06-12 RX ADMIN — AMPICILLIN SODIUM AND SULBACTAM SODIUM 3 G: 2; 1 INJECTION, POWDER, FOR SOLUTION INTRAMUSCULAR; INTRAVENOUS at 12:06

## 2020-06-12 RX ADMIN — VANCOMYCIN HYDROCHLORIDE 125 MG: KIT at 11:06

## 2020-06-12 RX ADMIN — ESTRADIOL 1 MG: 1 TABLET ORAL at 08:06

## 2020-06-12 RX ADMIN — INSULIN DETEMIR 25 UNITS: 100 INJECTION, SOLUTION SUBCUTANEOUS at 09:06

## 2020-06-12 RX ADMIN — INSULIN ASPART 10 UNITS: 100 INJECTION, SOLUTION INTRAVENOUS; SUBCUTANEOUS at 08:06

## 2020-06-12 RX ADMIN — INSULIN ASPART 15 UNITS: 100 INJECTION, SOLUTION INTRAVENOUS; SUBCUTANEOUS at 04:06

## 2020-06-12 RX ADMIN — AMPICILLIN SODIUM AND SULBACTAM SODIUM 3 G: 2; 1 INJECTION, POWDER, FOR SOLUTION INTRAMUSCULAR; INTRAVENOUS at 05:06

## 2020-06-12 RX ADMIN — INSULIN ASPART 8 UNITS: 100 INJECTION, SOLUTION INTRAVENOUS; SUBCUTANEOUS at 08:06

## 2020-06-12 RX ADMIN — AMPICILLIN SODIUM AND SULBACTAM SODIUM 3 G: 2; 1 INJECTION, POWDER, FOR SOLUTION INTRAMUSCULAR; INTRAVENOUS at 04:06

## 2020-06-12 NOTE — PROGRESS NOTES
Pt has been up and mobile today, moving about the room and ambulated off the unit. IV antibiotics continue. Pt refused  wound care as she said the  was supposed to change the dressing. Had no complaints of pain Accuchecks continue as ordered with insulin provided. Blood sugar this pm 309.

## 2020-06-12 NOTE — ASSESSMENT & PLAN NOTE
25 y/o transgender (male to female) with a history of COVID-19 infection and MRSA left gluteal abscess diagnosed in march, 2020.  She presented to [facility] June 2 with complaints of right gluteal abscess.  The wounds were debrided and there were plans for further debridement.  She chose to leave [place].  She presented to [facility] and was eventually transferred to [facility] [place].  An initial wound swab was positive for E coli and Enterococcus gallinarum.  She underwent debridement on 6/10/20.  Preliminary cultures from surgery are positive for E coli.  Per op note, no fistula was seen.  She is currently on unasyn.      Because unasyn has anaerobic activity as well, I worry that it may be harsh on her local GI fercho and increase her risk for recurrent C diff.  Okay to leave on unasyn for now pending finalization of surgical cultures.  If no anaerobes grow, then will plan to switch to a regimen that does not have anaerobic activity and is narrower.

## 2020-06-12 NOTE — SUBJECTIVE & OBJECTIVE
Interval History: NAEON    Review of Systems   Gastrointestinal: Positive for diarrhea.   Skin: Positive for wound.   All other systems reviewed and are negative.    Objective:     Vital Signs (Most Recent):  Temp: 98.4 °F (36.9 °C) (06/12/20 1106)  Pulse: 87 (06/12/20 1106)  Resp: 18 (06/12/20 1106)  BP: 112/61 (06/12/20 1106)  SpO2: 99 % (06/12/20 1238) Vital Signs (24h Range):  Temp:  [96 °F (35.6 °C)-98.4 °F (36.9 °C)] 98.4 °F (36.9 °C)  Pulse:  [66-96] 87  Resp:  [16-18] 18  SpO2:  [98 %-100 %] 99 %  BP: (109-121)/(61-73) 112/61     Weight: 83 kg (182 lb 15.7 oz)  Body mass index is 24.82 kg/m².    Estimated Creatinine Clearance: 104.2 mL/min (based on SCr of 1.2 mg/dL).    Physical Exam   Constitutional: He is oriented to person, place, and time. He appears well-developed and well-nourished. No distress.   HENT:   Head: Normocephalic and atraumatic.   Right Ear: External ear normal.   Left Ear: External ear normal.   Nose: Nose normal.   Mouth/Throat: Oropharynx is clear and moist. No oropharyngeal exudate.   Eyes: Pupils are equal, round, and reactive to light. Conjunctivae and EOM are normal. Right eye exhibits no discharge. Left eye exhibits no discharge. No scleral icterus.   Neck: Normal range of motion. Neck supple. No JVD present. No tracheal deviation present. No thyromegaly present.   Cardiovascular: Normal rate, regular rhythm and intact distal pulses. Exam reveals no gallop and no friction rub.   No murmur heard.  Pulmonary/Chest: Effort normal and breath sounds normal. No stridor. No respiratory distress. He has no wheezes. He has no rales. He exhibits no tenderness.   Abdominal: Soft. Bowel sounds are normal. He exhibits no distension and no mass. There is no tenderness. There is no rebound and no guarding.   Musculoskeletal: Normal range of motion. He exhibits no edema or tenderness.   Lymphadenopathy:     He has no cervical adenopathy.   Neurological: He is alert and oriented to person, place,  and time. He has normal reflexes. He displays normal reflexes. No cranial nerve deficit. He exhibits normal muscle tone. Coordination normal.   Skin: Skin is warm. No rash noted. He is not diaphoretic. No erythema. No pallor.   Right gluteal wound covered in dressings.  Patient won't let me examine it.   Psychiatric: He has a normal mood and affect. His behavior is normal. Judgment and thought content normal.   Nursing note and vitals reviewed.      Significant Labs: All pertinent labs within the past 24 hours have been reviewed.    Significant Imaging: I have reviewed all pertinent imaging results/findings within the past 24 hours.

## 2020-06-12 NOTE — PROGRESS NOTES
[facility]  Infectious Disease  Progress Note    Patient Name: Pratik Torres  MRN: [id number]  Admission Date: 6/7/2020  Length of Stay: 4 days  Attending Physician: Flower Saldivar MD  Primary Care Provider: Felipe Summers MD    Isolation Status: Special Contact  Assessment/Plan:      Gluteal abscess  25 y/o transgender (male to female) with a history of COVID-19 infection and MRSA left gluteal abscess diagnosed in march, 2020.  She presented to [facility] June 2 with complaints of right gluteal abscess.  The wounds were debrided and there were plans for further debridement.  She chose to leave [place].  She presented to [facility] and was eventually transferred to [facility].  An initial wound swab was positive for E coli and Enterococcus gallinarum.  She underwent debridement on 6/10/20.  Preliminary cultures from surgery are positive for E coli.  Per op note, no fistula was seen.  She is currently on unasyn.      -Because unasyn has anaerobic activity as well, there is concern that it may be harsh on her local GI fercho and increase her risk for recurrent C diff.  Okay to leave on unasyn for now pending finalization of surgical cultures.  If no anaerobes grow, then will plan to switch to a regimen that does not have anaerobic activity and is narrower.    Diarrhea  25 y/o with C diff     -treat with oral vancomycin for 14 days.            Thank you for your consult. I will follow-up with patient. Please contact us if you have any additional questions.    Collins Molina MD  Infectious Disease  [facility]    Subjective:     Principal Problem:Perirectal abscess    HPI: 25 y/o transgender (male to female) with a history of COVID-19 infection and MRSA left gluteal abscess diagnosed in march, 2020.  She presented to [facility] June 2 with complaints of right gluteal abscess.  The wounds were debrided and there were plans for further debridement.  She chose to leave [facility].  She  presented to [facility] and was eventually transferred to [facility].  An initial wound swab was positive for E coli and Enterococcus gallinarum.  She underwent debridement on 6/10/20.  Preliminary cultures from surgery are positive for E coli.  Per op note, no fistula was seen.  She is currently on unasyn.  Of note, he has had diarrhea since his admission.  Stool studies are positive for C diff.  She is on oral vancomycin.      Interval History: NAEON    Review of Systems   Gastrointestinal: Positive for diarrhea.   Skin: Positive for wound.   All other systems reviewed and are negative.    Objective:     Vital Signs (Most Recent):  Temp: 98.4 °F (36.9 °C) (06/12/20 1106)  Pulse: 87 (06/12/20 1106)  Resp: 18 (06/12/20 1106)  BP: 112/61 (06/12/20 1106)  SpO2: 99 % (06/12/20 1238) Vital Signs (24h Range):  Temp:  [96 °F (35.6 °C)-98.4 °F (36.9 °C)] 98.4 °F (36.9 °C)  Pulse:  [66-96] 87  Resp:  [16-18] 18  SpO2:  [98 %-100 %] 99 %  BP: (109-121)/(61-73) 112/61     Weight: 83 kg (182 lb 15.7 oz)  Body mass index is 24.82 kg/m².    Estimated Creatinine Clearance: 104.2 mL/min (based on SCr of 1.2 mg/dL).    Physical Exam   Constitutional: He is oriented to person, place, and time. He appears well-developed and well-nourished. No distress.   HENT:   Head: Normocephalic and atraumatic.   Right Ear: External ear normal.   Left Ear: External ear normal.   Nose: Nose normal.   Mouth/Throat: Oropharynx is clear and moist. No oropharyngeal exudate.   Eyes: Pupils are equal, round, and reactive to light. Conjunctivae and EOM are normal. Right eye exhibits no discharge. Left eye exhibits no discharge. No scleral icterus.   Neck: Normal range of motion. Neck supple. No JVD present. No tracheal deviation present. No thyromegaly present.   Cardiovascular: Normal rate, regular rhythm and intact distal pulses. Exam reveals no gallop and no friction rub.   No murmur heard.  Pulmonary/Chest: Effort normal and breath sounds  normal. No stridor. No respiratory distress. He has no wheezes. He has no rales. He exhibits no tenderness.   Abdominal: Soft. Bowel sounds are normal. He exhibits no distension and no mass. There is no tenderness. There is no rebound and no guarding.   Musculoskeletal: Normal range of motion. He exhibits no edema or tenderness.   Lymphadenopathy:     He has no cervical adenopathy.   Neurological: He is alert and oriented to person, place, and time. He has normal reflexes. He displays normal reflexes. No cranial nerve deficit. He exhibits normal muscle tone. Coordination normal.   Skin: Skin is warm. No rash noted. He is not diaphoretic. No erythema. No pallor.   Right gluteal wound covered in dressings.  Patient won't let me examine it.   Psychiatric: He has a normal mood and affect. His behavior is normal. Judgment and thought content normal.   Nursing note and vitals reviewed.      Significant Labs: All pertinent labs within the past 24 hours have been reviewed.    Significant Imaging: I have reviewed all pertinent imaging results/findings within the past 24 hours.

## 2020-06-12 NOTE — SUBJECTIVE & OBJECTIVE
Interval History: [place] feels better today. Her right buttock is less tender. She deferred labs initially this morning, but they ended up being drawn later on with patient's permission. Reports approximately 5 bowel movements today.    Review of Systems   Constitutional: Negative for activity change, chills and fever.   Eyes: Negative for photophobia and visual disturbance.   Respiratory: Negative for cough, chest tightness and shortness of breath.    Cardiovascular: Negative for chest pain and leg swelling.   Gastrointestinal: Positive for diarrhea. Negative for abdominal pain, blood in stool, constipation, nausea and vomiting.        Perirectal pain   Endocrine: Negative for polydipsia and polyuria.   Genitourinary: Negative for difficulty urinating and dysuria.   Musculoskeletal: Negative for gait problem and myalgias.   Neurological: Negative for syncope and headaches.   Psychiatric/Behavioral: Negative for agitation and confusion.     Objective:     Vital Signs (Most Recent):  Temp: 98.4 °F (36.9 °C) (06/12/20 1657)  Pulse: 84 (06/12/20 1657)  Resp: 18 (06/12/20 1657)  BP: 136/85 (06/12/20 1657)  SpO2: 100 % (06/12/20 1657) Vital Signs (24h Range):  Temp:  [96 °F (35.6 °C)-98.4 °F (36.9 °C)] 98.4 °F (36.9 °C)  Pulse:  [66-96] 84  Resp:  [16-18] 18  SpO2:  [98 %-100 %] 100 %  BP: (109-136)/(61-85) 136/85     Weight: 83 kg (182 lb 15.7 oz)  Body mass index is 24.82 kg/m².    Intake/Output Summary (Last 24 hours) at 6/12/2020 1659  Last data filed at 6/12/2020 0700  Gross per 24 hour   Intake 450 ml   Output --   Net 450 ml      Physical Exam   Constitutional: He appears well-developed and well-nourished. No distress.   Appears more comfortable, sitting up in bed   HENT:   Head: Normocephalic and atraumatic.   Eyes: EOM are normal. No scleral icterus.   Neck: Normal range of motion. Neck supple.   Cardiovascular: Normal rate, regular rhythm and intact distal pulses.   No murmur heard.  Pulmonary/Chest: Effort  normal. No stridor. No respiratory distress. He has no wheezes.   Abdominal: Soft. Bowel sounds are normal. He exhibits no distension. There is no tenderness.   Musculoskeletal: He exhibits no edema or tenderness.   Neurological: He is alert.   oriented   Skin: Skin is warm and dry. He is not diaphoretic.     Significant Labs: All pertinent labs within the past 24 hours have been reviewed.    Significant Imaging: I have reviewed and interpreted all pertinent imaging results/findings within the past 24 hours.

## 2020-06-12 NOTE — PLAN OF CARE
06/12/20 0855   Post-Acute Status   Post-Acute Authorization Placement   Post-Acute Placement Status Referrals Sent

## 2020-06-12 NOTE — CONSULTS
[facility]  Infectious Disease  Consult Note    Patient Name: Pratik Torres  MRN: [id number]  Admission Date: 6/7/2020  Hospital Length of Stay: 3 days  Attending Physician: Flower Saldivar MD  Primary Care Provider: Felipe Summers MD     Isolation Status: Special Contact    Patient information was obtained from patient, past medical records and ER records.      Inpatient consult to Infectious Diseases  Consult performed by: Abraham Mayo MD  Consult ordered by: Esther Mc MD        Assessment/Plan:     Gluteal abscess  25 y/o transgender (male to female) with a history of COVID-19 infection and MRSA left gluteal abscess diagnosed in march, 2020.  She presented to [facility] June 2 with complaints of right gluteal abscess.  The wounds were debrided and there were plans for further debridement.  She chose to leave [place].  She presented to [facility] and was eventually transferred to [facility].  An initial wound swab was positive for E coli and Enterococcus gallinarum.  She underwent debridement on 6/10/20.  Preliminary cultures from surgery are positive for E coli.  Per op note, no fistula was seen.  She is currently on unasyn.      Because unasyn has anaerobic activity as well, I worry that it may be harsh on her local GI fercho and increase her risk for recurrent C diff.  Okay to leave on unasyn for now pending finalization of surgical cultures.  If no anaerobes grow, then will plan to switch to a regimen that does not have anaerobic activity and is narrower.    Diarrhea  25 y/o with C diff.  Will treat with oral vancomycin for 14 days.        Thank you for your consult. I will follow-up with patient. Please contact us if you have any additional questions.    Abraham Mayo MD  Infectious Disease  [facility]    Subjective:     Principal Problem: Perirectal abscess    HPI: 25 y/o transgender (male to female) with a history of COVID-19 infection and MRSA left  gluteal abscess diagnosed in march, 2020.  She presented to [facility] June 2 with complaints of right gluteal abscess.  The wounds were debrided and there were plans for further debridement.  She chose to leave [place].  She presented to [facility] and was eventually transferred to [facility].  An initial wound swab was positive for E coli and Enterococcus gallinarum.  She underwent debridement on 6/10/20.  Preliminary cultures from surgery are positive for E coli.  Per op note, no fistula was seen.  She is currently on unasyn.  Of note, he has had diarrhea since his admission.  Stool studies are positive for C diff.  She is on oral vancomycin.        Past Medical History:   Diagnosis Date    Diabetes mellitus        Past Surgical History:   Procedure Laterality Date    COLONOSCOPY      INCISION OF PERIRECTAL ABSCESS N/A 6/10/2020    Procedure: INCISION, ABSCESS, PERIRECTAL;  Surgeon: Ronald Santo MD;  [facility];  Service: Colon and Rectal;  Laterality: N/A;  PATIENT IS COVID POSITIVE    TOE SURGERY      WOUND DEBRIDEMENT Right 6/10/2020    Procedure: DEBRIDEMENT, WOUND ISCHIORECTAL;  Surgeon: Ronald Santo MD;  [facility];  Service: Colon and Rectal;  Laterality: Right;       Review of patient's allergies indicates:   Allergen Reactions    Shrimp        Medications:  Medications Prior to Admission   Medication Sig    albuterol (PROVENTIL/VENTOLIN HFA) 90 mcg/actuation inhaler Inhale 1-2 puffs into the lungs every 6 (six) hours as needed for Wheezing. Rescue    estradioL (ESTRACE) 1 MG tablet Take 1 mg by mouth once daily.    insulin aspart U-100 (NOVOLOG) 100 unit/mL injection Inject 10 Units into the skin 3 (three) times daily before meals.    insulin glargine (LANTUS SOLOSTAR) 100 unit/mL (3 mL) InPn pen Inject 15 Units into the skin every evening. (Patient taking differently: Inject 40 Units into the skin every evening. )    loperamide (IMODIUM) 2 mg capsule Take 2 mg  by mouth 2 (two) times a day.    spironolactone (ALDACTONE) 25 MG tablet Take 25 mg by mouth once daily.     Antibiotics (From admission, onward)    Start     Stop Route Frequency Ordered    06/10/20 1715  ampicillin-sulbactam 3 g in sodium chloride 0.9 % 100 mL IVPB (ready to mix system)      -- IV Every 6 hours (non-standard times) 06/10/20 1606    06/10/20 1200  vancomycin 250mg / 10ml oral solution 125 mg  (C. difficile Infection (CDI) Treatment Order Panel)      06/24 1159 Oral Every 6 hours 06/10/20 0809        Antifungals (From admission, onward)    None        Antivirals (From admission, onward)    None             There is no immunization history on file for this patient.    Family History     None        Social History     Socioeconomic History    Marital status: Single     Spouse name: Not on file    Number of children: Not on file    Years of education: Not on file    Highest education level: Not on file   Occupational History    Not on file   Social Needs    Financial resource strain: Not on file    Food insecurity:     Worry: Not on file     Inability: Not on file    Transportation needs:     Medical: Not on file     Non-medical: Not on file   Tobacco Use    Smoking status: Never Smoker    Smokeless tobacco: Never Used   Substance and Sexual Activity    Alcohol use: Yes     Comment: occasionally    Drug use: Not Currently    Sexual activity: Not on file   Lifestyle    Physical activity:     Days per week: Not on file     Minutes per session: Not on file    Stress: Not on file   Relationships    Social connections:     Talks on phone: Not on file     Gets together: Not on file     Attends Sabianism service: Not on file     Active member of club or organization: Not on file     Attends meetings of clubs or organizations: Not on file     Relationship status: Not on file   Other Topics Concern    Not on file   Social History Narrative    Not on file     Review of Systems    Gastrointestinal: Positive for diarrhea.   Skin: Positive for wound.   All other systems reviewed and are negative.    Objective:     Vital Signs (Most Recent):  Temp: 96 °F (35.6 °C) (06/11/20 2211)  Pulse: 96 (06/11/20 2211)  Resp: 18 (06/11/20 2211)  BP: 117/65 (06/11/20 2211)  SpO2: 98 % (06/11/20 2211) Vital Signs (24h Range):  Temp:  [96 °F (35.6 °C)-98 °F (36.7 °C)] 96 °F (35.6 °C)  Pulse:  [64-96] 96  Resp:  [17-18] 18  SpO2:  [97 %-100 %] 98 %  BP: (109-125)/(61-86) 117/65     Weight: 83 kg (182 lb 15.7 oz)  Body mass index is 24.82 kg/m².    Estimated Creatinine Clearance: 83.3 mL/min (A) (based on SCr of 1.5 mg/dL (H)).    Physical Exam   Constitutional: He is oriented to person, place, and time. He appears well-developed and well-nourished. No distress.   HENT:   Head: Normocephalic and atraumatic.   Right Ear: External ear normal.   Left Ear: External ear normal.   Nose: Nose normal.   Mouth/Throat: Oropharynx is clear and moist. No oropharyngeal exudate.   Eyes: Pupils are equal, round, and reactive to light. Conjunctivae and EOM are normal. Right eye exhibits no discharge. Left eye exhibits no discharge. No scleral icterus.   Neck: Normal range of motion. Neck supple. No JVD present. No tracheal deviation present. No thyromegaly present.   Cardiovascular: Normal rate, regular rhythm and intact distal pulses. Exam reveals no gallop and no friction rub.   No murmur heard.  Pulmonary/Chest: Effort normal and breath sounds normal. No stridor. No respiratory distress. He has no wheezes. He has no rales. He exhibits no tenderness.   Abdominal: Soft. Bowel sounds are normal. He exhibits no distension and no mass. There is no tenderness. There is no rebound and no guarding.   Musculoskeletal: Normal range of motion. He exhibits no edema or tenderness.   Lymphadenopathy:     He has no cervical adenopathy.   Neurological: He is alert and oriented to person, place, and time. He has normal reflexes. He displays  normal reflexes. No cranial nerve deficit. He exhibits normal muscle tone. Coordination normal.   Skin: Skin is warm. No rash noted. He is not diaphoretic. No erythema. No pallor.   Right gluteal wound covered in dressings.  Patient won't let me examine it.   Psychiatric: He has a normal mood and affect. His behavior is normal. Judgment and thought content normal.   Nursing note and vitals reviewed.      Significant Labs:   Microbiology Results (last 7 days)     Procedure Component Value Units Date/Time    AFB Culture & Smear [[id number]] Collected:  06/10/20 1852    Order Status:  Completed Specimen:  Abscess from Buttocks, Right Updated:  06/11/20 2127     AFB Culture & Smear Culture in progress     AFB CULTURE STAIN No acid fast bacilli seen.    Narrative:       Ischiorectal abcess    AFB Culture & Smear [[id number]] Collected:  06/10/20 1852    Order Status:  Completed Specimen:  Tissue from Buttocks, Right Updated:  06/11/20 2127     AFB Culture & Smear Culture in progress     AFB CULTURE STAIN No acid fast bacilli seen.    Narrative:       Right buttock skin and fat    E. coli 0157 antigen [[id number]] Collected:  06/09/20 1916    Order Status:  Completed Specimen:  Stool Updated:  06/11/20 1412     Shiga Toxin 1 E.coli Negative     Shiga Toxin 2 E.coli Negative    Aerobic culture [[id number]] Collected:  06/10/20 1852    Order Status:  Completed Specimen:  Abscess from Buttocks, Right Updated:  06/11/20 1401     Aerobic Bacterial Culture No significant isolate to date    Narrative:       Ischiorectal abcess    Aerobic culture [[id number]]  (Abnormal) Collected:  06/10/20 1852    Order Status:  Completed Specimen:  Skin from Buttocks, Right Updated:  06/11/20 1300     Aerobic Bacterial Culture ESCHERICHIA COLI  Many  Susceptibility pending  No other significant isolate      Narrative:       Right buttock skin and fat    Fungus culture [[id number]] Collected:  06/10/20 1852    Order Status:  Completed Specimen:   Abscess from Buttocks, Right Updated:  06/11/20 1251     Fungus (Mycology) Culture Culture in progress    Narrative:       Ischiorectal abcess    Fungus culture [[id number]] Collected:  06/10/20 1852    Order Status:  Completed Specimen:  Tissue from Buttocks, Right Updated:  06/11/20 1251     Fungus (Mycology) Culture Culture in progress    Narrative:       Right buttock skin and fat    Blood Culture #2 **CANNOT BE ORDERED STAT** [[id number]] Collected:  06/07/20 2129    Order Status:  Completed Specimen:  Blood Updated:  06/11/20 1012     Blood Culture, Routine No Growth to date      No Growth to date      No Growth to date      No Growth to date    Blood Culture #1 **CANNOT BE ORDERED STAT** [[id number]] Collected:  06/07/20 2049    Order Status:  Completed Specimen:  Blood from Peripheral, Forearm, Left Updated:  06/11/20 1012     Blood Culture, Routine No Growth to date      No Growth to date      No Growth to date      No Growth to date    Culture, Anaerobe [[id number]] Collected:  06/10/20 1852    Order Status:  Completed Specimen:  Abscess from Buttocks, Right Updated:  06/11/20 0723     Anaerobic Culture Culture in progress    Narrative:       Ischiorectal abcess    Culture, Anaerobe [[id number]] Collected:  06/10/20 1852    Order Status:  Completed Specimen:  Tissue from Buttocks, Right Updated:  06/11/20 0723     Anaerobic Culture Culture in progress    Narrative:       Right buttock skin and fat    Gram stain [[id number]] Collected:  06/10/20 1852    Order Status:  Completed Specimen:  Tissue from Buttocks, Right Updated:  06/10/20 2149     Gram Stain Result Rare WBC's      Many Gram positive cocci      Few Gram positive rods      Rare Gram negative rods    Narrative:       Right buttock skin and fat    Gram stain [[id number]] Collected:  06/10/20 1852    Order Status:  Completed Specimen:  Abscess from Buttocks, Right Updated:  06/10/20 2141     Gram Stain Result Rare WBC's      Rare Gram negative rods       Rare Gram positive cocci    Narrative:       Ischiorectal abcess    Stool culture [[id number]] Collected:  06/09/20 1916    Order Status:  Completed Specimen:  Stool Updated:  06/10/20 1418     Stool Culture Nothing significant to date    Aerobic culture [[id number]]  (Abnormal)  (Susceptibility) Collected:  06/08/20 1302    Order Status:  Completed Specimen:  Abscess Updated:  06/10/20 1231     Aerobic Bacterial Culture ESCHERICHIA COLI  Few        ENTEROCOCCUS GALLINARUM  Moderate      Narrative:       Gluteal region    C Diff Toxin by PCR [[id number]]  (Abnormal) Collected:  06/09/20 1916    Order Status:  Completed Updated:  06/10/20 0429     C. diff PCR Positive    Clostridium difficile EIA [[id number]]  (Abnormal) Collected:  06/09/20 1916    Order Status:  Completed Specimen:  Stool Updated:  06/10/20 0341     C. diff Antigen Positive     C difficile Toxins A+B, EIA Negative     Comment: Testing not recommended for children <24 months old.             Significant Imaging: I have reviewed all pertinent imaging results/findings within the past 24 hours.

## 2020-06-12 NOTE — PLAN OF CARE
Pt remain free from fall and injuries. Refused LB this AM. Pt gets annoyed easily with care. VSS. Refused Visi change. Safety maintained. Placed on special contact fo C-diff.   Will monitor.

## 2020-06-12 NOTE — ASSESSMENT & PLAN NOTE
25 y/o transgender (male to female) with a history of COVID-19 infection and MRSA left gluteal abscess diagnosed in march, 2020.  She presented to [facility] June 2 with complaints of right gluteal abscess.  The wounds were debrided and there were plans for further debridement.  She chose to leave [place].  She presented to [facility] and was eventually transferred to [facility] main [place].  An initial wound swab was positive for E coli and Enterococcus gallinarum.  She underwent debridement on 6/10/20.  Preliminary cultures from surgery are positive for E coli.  Per op note, no fistula was seen.  She is currently on unasyn.      -Because unasyn has anaerobic activity as well, there is concern that it may be harsh on her local GI fercho and increase her risk for recurrent C diff.  Okay to leave on unasyn for now pending finalization of surgical cultures.  If no anaerobes grow, then will plan to switch to a regimen that does not have anaerobic activity and is narrower.

## 2020-06-12 NOTE — PROGRESS NOTES
Pt transferred to [facility] on 6/11/20. [facility] CM to resume care.

## 2020-06-12 NOTE — NURSING
"Pt arrived to room 646 with transport. Ambulate, VSS. BG elevated, coverage given. States, had two formed BM today. Prefer to be called her as "Tokeyo". Safety maintained, will monitor.   "

## 2020-06-12 NOTE — SUBJECTIVE & OBJECTIVE
Past Medical History:   Diagnosis Date    Diabetes mellitus        Past Surgical History:   Procedure Laterality Date    COLONOSCOPY      INCISION OF PERIRECTAL ABSCESS N/A 6/10/2020    Procedure: INCISION, ABSCESS, PERIRECTAL;  Surgeon: Ronald Santo MD;  [facility];  Service: Colon and Rectal;  Laterality: N/A;  PATIENT IS COVID POSITIVE    TOE SURGERY      WOUND DEBRIDEMENT Right 6/10/2020    Procedure: DEBRIDEMENT, WOUND ISCHIORECTAL;  Surgeon: Ronald Santo MD;  [facility];  Service: Colon and Rectal;  Laterality: Right;       Review of patient's allergies indicates:   Allergen Reactions    Shrimp        Medications:  Medications Prior to Admission   Medication Sig    albuterol (PROVENTIL/VENTOLIN HFA) 90 mcg/actuation inhaler Inhale 1-2 puffs into the lungs every 6 (six) hours as needed for Wheezing. Rescue    estradioL (ESTRACE) 1 MG tablet Take 1 mg by mouth once daily.    insulin aspart U-100 (NOVOLOG) 100 unit/mL injection Inject 10 Units into the skin 3 (three) times daily before meals.    insulin glargine (LANTUS SOLOSTAR) 100 unit/mL (3 mL) InPn pen Inject 15 Units into the skin every evening. (Patient taking differently: Inject 40 Units into the skin every evening. )    loperamide (IMODIUM) 2 mg capsule Take 2 mg by mouth 2 (two) times a day.    spironolactone (ALDACTONE) 25 MG tablet Take 25 mg by mouth once daily.     Antibiotics (From admission, onward)    Start     Stop Route Frequency Ordered    06/10/20 1715  ampicillin-sulbactam 3 g in sodium chloride 0.9 % 100 mL IVPB (ready to mix system)      -- IV Every 6 hours (non-standard times) 06/10/20 1606    06/10/20 1200  vancomycin 250mg / 10ml oral solution 125 mg  (C. difficile Infection (CDI) Treatment Order Panel)      06/24 1159 Oral Every 6 hours 06/10/20 0809        Antifungals (From admission, onward)    None        Antivirals (From admission, onward)    None             There is no immunization  history on file for this patient.    Family History     None        Social History     Socioeconomic History    Marital status: Single     Spouse name: Not on file    Number of children: Not on file    Years of education: Not on file    Highest education level: Not on file   Occupational History    Not on file   Social Needs    Financial resource strain: Not on file    Food insecurity:     Worry: Not on file     Inability: Not on file    Transportation needs:     Medical: Not on file     Non-medical: Not on file   Tobacco Use    Smoking status: Never Smoker    Smokeless tobacco: Never Used   Substance and Sexual Activity    Alcohol use: Yes     Comment: occasionally    Drug use: Not Currently    Sexual activity: Not on file   Lifestyle    Physical activity:     Days per week: Not on file     Minutes per session: Not on file    Stress: Not on file   Relationships    Social connections:     Talks on phone: Not on file     Gets together: Not on file     Attends Orthodox service: Not on file     Active member of club or organization: Not on file     Attends meetings of clubs or organizations: Not on file     Relationship status: Not on file   Other Topics Concern    Not on file   Social History Narrative    Not on file     Review of Systems   Gastrointestinal: Positive for diarrhea.   Skin: Positive for wound.   All other systems reviewed and are negative.    Objective:     Vital Signs (Most Recent):  Temp: 96 °F (35.6 °C) (06/11/20 2211)  Pulse: 96 (06/11/20 2211)  Resp: 18 (06/11/20 2211)  BP: 117/65 (06/11/20 2211)  SpO2: 98 % (06/11/20 2211) Vital Signs (24h Range):  Temp:  [96 °F (35.6 °C)-98 °F (36.7 °C)] 96 °F (35.6 °C)  Pulse:  [64-96] 96  Resp:  [17-18] 18  SpO2:  [97 %-100 %] 98 %  BP: (109-125)/(61-86) 117/65     Weight: 83 kg (182 lb 15.7 oz)  Body mass index is 24.82 kg/m².    Estimated Creatinine Clearance: 83.3 mL/min (A) (based on SCr of 1.5 mg/dL (H)).    Physical Exam    Constitutional: He is oriented to person, place, and time. He appears well-developed and well-nourished. No distress.   HENT:   Head: Normocephalic and atraumatic.   Right Ear: External ear normal.   Left Ear: External ear normal.   Nose: Nose normal.   Mouth/Throat: Oropharynx is clear and moist. No oropharyngeal exudate.   Eyes: Pupils are equal, round, and reactive to light. Conjunctivae and EOM are normal. Right eye exhibits no discharge. Left eye exhibits no discharge. No scleral icterus.   Neck: Normal range of motion. Neck supple. No JVD present. No tracheal deviation present. No thyromegaly present.   Cardiovascular: Normal rate, regular rhythm and intact distal pulses. Exam reveals no gallop and no friction rub.   No murmur heard.  Pulmonary/Chest: Effort normal and breath sounds normal. No stridor. No respiratory distress. He has no wheezes. He has no rales. He exhibits no tenderness.   Abdominal: Soft. Bowel sounds are normal. He exhibits no distension and no mass. There is no tenderness. There is no rebound and no guarding.   Musculoskeletal: Normal range of motion. He exhibits no edema or tenderness.   Lymphadenopathy:     He has no cervical adenopathy.   Neurological: He is alert and oriented to person, place, and time. He has normal reflexes. He displays normal reflexes. No cranial nerve deficit. He exhibits normal muscle tone. Coordination normal.   Skin: Skin is warm. No rash noted. He is not diaphoretic. No erythema. No pallor.   Right gluteal wound covered in dressings.  Patient won't let me examine it.   Psychiatric: He has a normal mood and affect. His behavior is normal. Judgment and thought content normal.   Nursing note and vitals reviewed.      Significant Labs:   Microbiology Results (last 7 days)     Procedure Component Value Units Date/Time    AFB Culture & Smear [[id number]] Collected:  06/10/20 8321    Order Status:  Completed Specimen:  Abscess from Buttocks, Right Updated:  06/11/20  2127     AFB Culture & Smear Culture in progress     AFB CULTURE STAIN No acid fast bacilli seen.    Narrative:       Ischiorectal abcess    AFB Culture & Smear [[id number]] Collected:  06/10/20 1852    Order Status:  Completed Specimen:  Tissue from Buttocks, Right Updated:  06/11/20 2127     AFB Culture & Smear Culture in progress     AFB CULTURE STAIN No acid fast bacilli seen.    Narrative:       Right buttock skin and fat    E. coli 0157 antigen [[id number]] Collected:  06/09/20 1916    Order Status:  Completed Specimen:  Stool Updated:  06/11/20 1412     Shiga Toxin 1 E.coli Negative     Shiga Toxin 2 E.coli Negative    Aerobic culture [[id number]] Collected:  06/10/20 1852    Order Status:  Completed Specimen:  Abscess from Buttocks, Right Updated:  06/11/20 1401     Aerobic Bacterial Culture No significant isolate to date    Narrative:       Ischiorectal abcess    Aerobic culture [[id number]]  (Abnormal) Collected:  06/10/20 1852    Order Status:  Completed Specimen:  Skin from Buttocks, Right Updated:  06/11/20 1300     Aerobic Bacterial Culture ESCHERICHIA COLI  Many  Susceptibility pending  No other significant isolate      Narrative:       Right buttock skin and fat    Fungus culture [[id number]] Collected:  06/10/20 1852    Order Status:  Completed Specimen:  Abscess from Buttocks, Right Updated:  06/11/20 1251     Fungus (Mycology) Culture Culture in progress    Narrative:       Ischiorectal abcess    Fungus culture [[id number]] Collected:  06/10/20 1852    Order Status:  Completed Specimen:  Tissue from Buttocks, Right Updated:  06/11/20 1251     Fungus (Mycology) Culture Culture in progress    Narrative:       Right buttock skin and fat    Blood Culture #2 **CANNOT BE ORDERED STAT** [[id number]] Collected:  06/07/20 2129    Order Status:  Completed Specimen:  Blood Updated:  06/11/20 1012     Blood Culture, Routine No Growth to date      No Growth to date      No Growth to date      No Growth to date     Blood Culture #1 **CANNOT BE ORDERED STAT** [[id number]] Collected:  06/07/20 2049    Order Status:  Completed Specimen:  Blood from Peripheral, Forearm, Left Updated:  06/11/20 1012     Blood Culture, Routine No Growth to date      No Growth to date      No Growth to date      No Growth to date    Culture, Anaerobe [[id number]] Collected:  06/10/20 1852    Order Status:  Completed Specimen:  Abscess from Buttocks, Right Updated:  06/11/20 0723     Anaerobic Culture Culture in progress    Narrative:       Ischiorectal abcess    Culture, Anaerobe [[id number]] Collected:  06/10/20 1852    Order Status:  Completed Specimen:  Tissue from Buttocks, Right Updated:  06/11/20 0723     Anaerobic Culture Culture in progress    Narrative:       Right buttock skin and fat    Gram stain [[id number]] Collected:  06/10/20 1852    Order Status:  Completed Specimen:  Tissue from Buttocks, Right Updated:  06/10/20 2149     Gram Stain Result Rare WBC's      Many Gram positive cocci      Few Gram positive rods      Rare Gram negative rods    Narrative:       Right buttock skin and fat    Gram stain [[id number]] Collected:  06/10/20 1852    Order Status:  Completed Specimen:  Abscess from Buttocks, Right Updated:  06/10/20 2141     Gram Stain Result Rare WBC's      Rare Gram negative rods      Rare Gram positive cocci    Narrative:       Ischiorectal abcess    Stool culture [[id number]] Collected:  06/09/20 1916    Order Status:  Completed Specimen:  Stool Updated:  06/10/20 1418     Stool Culture Nothing significant to date    Aerobic culture [[id number]]  (Abnormal)  (Susceptibility) Collected:  06/08/20 1302    Order Status:  Completed Specimen:  Abscess Updated:  06/10/20 1231     Aerobic Bacterial Culture ESCHERICHIA COLI  Few        ENTEROCOCCUS GALLINARUM  Moderate      Narrative:       Gluteal region    C Diff Toxin by PCR [[id number]]  (Abnormal) Collected:  06/09/20 1916    Order Status:  Completed Updated:  06/10/20 0429     C. diff  PCR Positive    Clostridium difficile EIA [[id number]]  (Abnormal) Collected:  06/09/20 1916    Order Status:  Completed Specimen:  Stool Updated:  06/10/20 0341     C. diff Antigen Positive     C difficile Toxins A+B, EIA Negative     Comment: Testing not recommended for children <24 months old.             Significant Imaging: I have reviewed all pertinent imaging results/findings within the past 24 hours.

## 2020-06-12 NOTE — HPI
25 y/o transgender (male to female) with a history of COVID-19 infection and MRSA left gluteal abscess diagnosed in march, 2020.  She presented to [facility] June 2 with complaints of right gluteal abscess.  The wounds were debrided and there were plans for further debridement.  She chose to leave [place].  She presented to [facility] and was eventually transferred to [facility] main campus.  An initial wound swab was positive for E coli and Enterococcus gallinarum.  She underwent debridement on 6/10/20.  Preliminary cultures from surgery are positive for E coli.  Per op note, no fistula was seen.  She is currently on unasyn.  Of note, he has had diarrhea since his admission.  Stool studies are positive for C diff.  She is on oral vancomycin.

## 2020-06-12 NOTE — PLAN OF CARE
Patient is not medically ready for discharge.        06/12/20 1221   Discharge Reassessment   Assessment Type Discharge Planning Reassessment   Discharge Plan A Long-term acute care facility (LTAC)   Discharge Plan B Shelter   DME Needed Upon Discharge  none   Anticipated Discharge Disposition LTAC     Meghan Radford RN, CM   Ext: [phone]

## 2020-06-13 LAB
ALBUMIN SERPL BCP-MCNC: 2.3 G/DL (ref 3.5–5.2)
ALP SERPL-CCNC: 75 U/L (ref 55–135)
ALT SERPL W/O P-5'-P-CCNC: 7 U/L (ref 10–44)
ANION GAP SERPL CALC-SCNC: 6 MMOL/L (ref 8–16)
AST SERPL-CCNC: 18 U/L (ref 10–40)
BACTERIA BLD CULT: NORMAL
BACTERIA BLD CULT: NORMAL
BACTERIA SPEC AEROBE CULT: ABNORMAL
BACTERIA STL CULT: NORMAL
BASOPHILS # BLD AUTO: 0.03 K/UL (ref 0–0.2)
BASOPHILS NFR BLD: 0.4 % (ref 0–1.9)
BILIRUB SERPL-MCNC: 0.2 MG/DL (ref 0.1–1)
BUN SERPL-MCNC: 11 MG/DL (ref 6–20)
CALCIUM SERPL-MCNC: 8.8 MG/DL (ref 8.7–10.5)
CHLORIDE SERPL-SCNC: 104 MMOL/L (ref 95–110)
CO2 SERPL-SCNC: 26 MMOL/L (ref 23–29)
CREAT SERPL-MCNC: 1.2 MG/DL (ref 0.5–1.4)
DIFFERENTIAL METHOD: ABNORMAL
EOSINOPHIL # BLD AUTO: 0.2 K/UL (ref 0–0.5)
EOSINOPHIL NFR BLD: 2.5 % (ref 0–8)
ERYTHROCYTE [DISTWIDTH] IN BLOOD BY AUTOMATED COUNT: 14.5 % (ref 11.5–14.5)
EST. GFR  (AFRICAN AMERICAN): >60 ML/MIN/1.73 M^2
EST. GFR  (NON AFRICAN AMERICAN): >60 ML/MIN/1.73 M^2
GLUCOSE SERPL-MCNC: 294 MG/DL (ref 70–110)
HCT VFR BLD AUTO: 27.9 % (ref 40–54)
HGB BLD-MCNC: 8.7 G/DL (ref 14–18)
IMM GRANULOCYTES # BLD AUTO: 0.07 K/UL (ref 0–0.04)
IMM GRANULOCYTES NFR BLD AUTO: 1 % (ref 0–0.5)
LYMPHOCYTES # BLD AUTO: 3.2 K/UL (ref 1–4.8)
LYMPHOCYTES NFR BLD: 45 % (ref 18–48)
MAGNESIUM SERPL-MCNC: 1.6 MG/DL (ref 1.6–2.6)
MCH RBC QN AUTO: 27.1 PG (ref 27–31)
MCHC RBC AUTO-ENTMCNC: 31.2 G/DL (ref 32–36)
MCV RBC AUTO: 87 FL (ref 82–98)
MONOCYTES # BLD AUTO: 0.5 K/UL (ref 0.3–1)
MONOCYTES NFR BLD: 7.6 % (ref 4–15)
NEUTROPHILS # BLD AUTO: 3.1 K/UL (ref 1.8–7.7)
NEUTROPHILS NFR BLD: 43.5 % (ref 38–73)
NRBC BLD-RTO: 0 /100 WBC
PHOSPHATE SERPL-MCNC: 3.5 MG/DL (ref 2.7–4.5)
PLATELET # BLD AUTO: 366 K/UL (ref 150–350)
PMV BLD AUTO: 9.7 FL (ref 9.2–12.9)
POCT GLUCOSE: 142 MG/DL (ref 70–110)
POCT GLUCOSE: 154 MG/DL (ref 70–110)
POCT GLUCOSE: 207 MG/DL (ref 70–110)
POCT GLUCOSE: 266 MG/DL (ref 70–110)
POCT GLUCOSE: 418 MG/DL (ref 70–110)
POTASSIUM SERPL-SCNC: 4.5 MMOL/L (ref 3.5–5.1)
PROT SERPL-MCNC: 9.4 G/DL (ref 6–8.4)
RBC # BLD AUTO: 3.21 M/UL (ref 4.6–6.2)
SODIUM SERPL-SCNC: 136 MMOL/L (ref 136–145)
WBC # BLD AUTO: 7.11 K/UL (ref 3.9–12.7)

## 2020-06-13 PROCEDURE — 36415 COLL VENOUS BLD VENIPUNCTURE: CPT

## 2020-06-13 PROCEDURE — 63600175 PHARM REV CODE 636 W HCPCS: Performed by: STUDENT IN AN ORGANIZED HEALTH CARE EDUCATION/TRAINING PROGRAM

## 2020-06-13 PROCEDURE — 80053 COMPREHEN METABOLIC PANEL: CPT

## 2020-06-13 PROCEDURE — 99233 PR SUBSEQUENT HOSPITAL CARE,LEVL III: ICD-10-PCS | Mod: ,,, | Performed by: HOSPITALIST

## 2020-06-13 PROCEDURE — 11000001 HC ACUTE MED/SURG PRIVATE ROOM

## 2020-06-13 PROCEDURE — 83735 ASSAY OF MAGNESIUM: CPT

## 2020-06-13 PROCEDURE — 99233 SBSQ HOSP IP/OBS HIGH 50: CPT | Mod: ,,, | Performed by: HOSPITALIST

## 2020-06-13 PROCEDURE — 25000003 PHARM REV CODE 250: Performed by: STUDENT IN AN ORGANIZED HEALTH CARE EDUCATION/TRAINING PROGRAM

## 2020-06-13 PROCEDURE — 84100 ASSAY OF PHOSPHORUS: CPT

## 2020-06-13 PROCEDURE — 99232 PR SUBSEQUENT HOSPITAL CARE,LEVL II: ICD-10-PCS | Mod: ,,, | Performed by: INTERNAL MEDICINE

## 2020-06-13 PROCEDURE — 85025 COMPLETE CBC W/AUTO DIFF WBC: CPT

## 2020-06-13 PROCEDURE — 99232 SBSQ HOSP IP/OBS MODERATE 35: CPT | Mod: ,,, | Performed by: INTERNAL MEDICINE

## 2020-06-13 RX ADMIN — VANCOMYCIN HYDROCHLORIDE 125 MG: KIT at 12:06

## 2020-06-13 RX ADMIN — MORPHINE SULFATE 4 MG: 4 INJECTION INTRAVENOUS at 07:06

## 2020-06-13 RX ADMIN — VANCOMYCIN HYDROCHLORIDE 125 MG: KIT at 11:06

## 2020-06-13 RX ADMIN — INSULIN ASPART 15 UNITS: 100 INJECTION, SOLUTION INTRAVENOUS; SUBCUTANEOUS at 12:06

## 2020-06-13 RX ADMIN — AMPICILLIN SODIUM AND SULBACTAM SODIUM 3 G: 2; 1 INJECTION, POWDER, FOR SOLUTION INTRAMUSCULAR; INTRAVENOUS at 11:06

## 2020-06-13 RX ADMIN — INSULIN DETEMIR 25 UNITS: 100 INJECTION, SOLUTION SUBCUTANEOUS at 08:06

## 2020-06-13 RX ADMIN — AMPICILLIN SODIUM AND SULBACTAM SODIUM 3 G: 2; 1 INJECTION, POWDER, FOR SOLUTION INTRAMUSCULAR; INTRAVENOUS at 06:06

## 2020-06-13 RX ADMIN — INSULIN DETEMIR 25 UNITS: 100 INJECTION, SOLUTION SUBCUTANEOUS at 09:06

## 2020-06-13 RX ADMIN — INSULIN ASPART 15 UNITS: 100 INJECTION, SOLUTION INTRAVENOUS; SUBCUTANEOUS at 06:06

## 2020-06-13 RX ADMIN — INSULIN ASPART 10 UNITS: 100 INJECTION, SOLUTION INTRAVENOUS; SUBCUTANEOUS at 08:06

## 2020-06-13 RX ADMIN — INSULIN ASPART 4 UNITS: 100 INJECTION, SOLUTION INTRAVENOUS; SUBCUTANEOUS at 06:06

## 2020-06-13 RX ADMIN — INSULIN ASPART 15 UNITS: 100 INJECTION, SOLUTION INTRAVENOUS; SUBCUTANEOUS at 08:06

## 2020-06-13 RX ADMIN — AMPICILLIN SODIUM AND SULBACTAM SODIUM 3 G: 2; 1 INJECTION, POWDER, FOR SOLUTION INTRAMUSCULAR; INTRAVENOUS at 04:06

## 2020-06-13 RX ADMIN — ESTRADIOL 1 MG: 1 TABLET ORAL at 09:06

## 2020-06-13 RX ADMIN — VANCOMYCIN HYDROCHLORIDE 125 MG: KIT at 06:06

## 2020-06-13 RX ADMIN — AMPICILLIN SODIUM AND SULBACTAM SODIUM 3 G: 2; 1 INJECTION, POWDER, FOR SOLUTION INTRAMUSCULAR; INTRAVENOUS at 12:06

## 2020-06-13 NOTE — PLAN OF CARE
Pt remain free from fall and injuries. VSS. Ambulated Safety maintained. Placed on special contact fo C-diff and MRSA. Will monitor.

## 2020-06-13 NOTE — SUBJECTIVE & OBJECTIVE
Interval History: NAEON    Review of Systems   Gastrointestinal: Positive for diarrhea.   Skin: Positive for wound.   All other systems reviewed and are negative.    Objective:     Vital Signs (Most Recent):  Temp: 98.6 °F (37 °C) (06/13/20 1100)  Pulse: 89 (06/13/20 0827)  Resp: 18 (06/13/20 0827)  BP: (!) 107/56 (06/13/20 1100)  SpO2: 99 % (06/13/20 1100) Vital Signs (24h Range):  Temp:  [97.9 °F (36.6 °C)-98.6 °F (37 °C)] 98.6 °F (37 °C)  Pulse:  [75-89] 89  Resp:  [16-18] 18  SpO2:  [99 %-100 %] 99 %  BP: (107-153)/(56-97) 107/56     Weight: 83 kg (182 lb 15.7 oz)  Body mass index is 24.82 kg/m².    Estimated Creatinine Clearance: 104.2 mL/min (based on SCr of 1.2 mg/dL).    Physical Exam  Vitals signs and nursing note reviewed.   Constitutional:       General: He is not in acute distress.     Appearance: He is well-developed and well-nourished. He is not diaphoretic.   HENT:      Head: Normocephalic and atraumatic.      Right Ear: External ear normal.      Left Ear: External ear normal.      Nose: Nose normal.      Mouth/Throat:      Mouth: Oropharynx is clear and moist.      Pharynx: No oropharyngeal exudate.   Eyes:      General: No scleral icterus.        Right eye: No discharge.         Left eye: No discharge.      Extraocular Movements: EOM normal.      Conjunctiva/sclera: Conjunctivae normal.      Pupils: Pupils are equal, round, and reactive to light.   Neck:      Musculoskeletal: Normal range of motion and neck supple.      Thyroid: No thyromegaly.      Vascular: No JVD.      Trachea: No tracheal deviation.   Cardiovascular:      Rate and Rhythm: Normal rate and regular rhythm.      Pulses: Intact distal pulses.      Heart sounds: No murmur. No friction rub. No gallop.    Pulmonary:      Effort: Pulmonary effort is normal. No respiratory distress.      Breath sounds: Normal breath sounds. No stridor. No wheezing or rales.   Chest:      Chest wall: No tenderness.   Abdominal:      General: Bowel sounds  are normal. There is no distension.      Palpations: Abdomen is soft. There is no mass.      Tenderness: There is no abdominal tenderness. There is no guarding or rebound.   Musculoskeletal: Normal range of motion.         General: No tenderness or edema.   Lymphadenopathy:      Cervical: No cervical adenopathy.   Skin:     General: Skin is warm.      Coloration: Skin is not pale.      Findings: No erythema or rash.      Comments: Right gluteal wound covered in dressings.  Patient won't let me examine it.   Neurological:      Mental Status: He is alert and oriented to person, place, and time.      Cranial Nerves: No cranial nerve deficit.      Motor: No abnormal muscle tone.      Coordination: Coordination normal.      Deep Tendon Reflexes: Reflexes are normal and symmetric. Reflexes normal.   Psychiatric:         Mood and Affect: Mood and affect normal.         Behavior: Behavior normal.         Thought Content: Thought content normal.         Judgment: Judgment normal.         Significant Labs: All pertinent labs within the past 24 hours have been reviewed.    Significant Imaging: I have reviewed all pertinent imaging results/findings within the past 24 hours.

## 2020-06-13 NOTE — ASSESSMENT & PLAN NOTE
23 y/o transgender (male to female) with a history of COVID-19 infection and MRSA left gluteal abscess diagnosed in march, 2020.  She presented to [facility] June 2 with complaints of right gluteal abscess.  The wounds were debrided and there were plans for further debridement.  She chose to leave [place].  She presented to [facility] and was eventually transferred to [facility] main campus.  An initial wound swab was positive for E coli and Enterococcus gallinarum.  She underwent debridement on 6/10/20.  Preliminary cultures from surgery are positive for E coli.  Per op note, no fistula was seen.  She is currently on unasyn.      -due to anaerobic culture being positive she will need coverage for anaerobes  -continue on unasyn while in patient and switch to Augmentin on discharge  -would treat for 14 days from drainage  -would extend c diff treatment for 5 days after augmentin finishes to hopefully decrease chance of c diff recurrence   -ID will sign off

## 2020-06-13 NOTE — CARE UPDATE
Called by nursing with concerns of increasing pain at wound site today. Patient states that she is not having pain at all. She requested her wound be 'stitched up'. I tried explaining that a wound like this can't be stitched up, at which point she became irate and started screaming at me to get out of her room. I left. Wound care to continue dressing changes. Signing off. Call with issues.    Kendra Fairbanks MD  [id number]  [phone]

## 2020-06-13 NOTE — PROGRESS NOTES
[facility]  Infectious Disease  Progress Note    Patient Name: Pratik Torres  MRN: [id number]  Admission Date: 6/7/2020  Length of Stay: 5 days  Attending Physician: Tonie Arzate MD  Primary Care Provider: Felipe Summers MD    Isolation Status: Special Contact  Assessment/Plan:      Gluteal abscess  25 y/o transgender (male to female) with a history of COVID-19 infection and MRSA left gluteal abscess diagnosed in march, 2020.  She presented to [facility] June 2 with complaints of right gluteal abscess.  The wounds were debrided and there were plans for further debridement.  She chose to leave [place].  She presented to [facility] and was eventually transferred to [facility].  An initial wound swab was positive for E coli and Enterococcus gallinarum.  She underwent debridement on 6/10/20.  Preliminary cultures from surgery are positive for E coli.  Per op note, no fistula was seen.  She is currently on unasyn.      -due to anaerobic culture being positive she will need coverage for anaerobes  -continue on unasyn while in patient and switch to Augmentin on discharge  -would treat for 14 days from drainage  -would extend c diff treatment for 5 days after augmentin finishes to hopefully decrease chance of c diff recurrence   -ID will sign off    Diarrhea  25 y/o with C diff     -treat for 5 more days after therapy or abscess ends          Thank you for your consult. I will sign off. Please contact us if you have any additional questions.    Collins Molina MD  Infectious Disease  [facility]    Subjective:     Principal Problem:Perirectal abscess    HPI: 25 y/o transgender (male to female) with a history of COVID-19 infection and MRSA left gluteal abscess diagnosed in march, 2020.  She presented to [facility] June 2 with complaints of right gluteal abscess.  The wounds were debrided and there were plans for further debridement.  She chose to leave [facility].  [facility] and was eventually transferred to [facility].  An initial wound swab was positive for E coli and Enterococcus gallinarum.  She underwent debridement on 6/10/20.  Preliminary cultures from surgery are positive for E coli.  Per op note, no fistula was seen.  She is currently on unasyn.  Of note, he has had diarrhea since his admission.  Stool studies are positive for C diff.  She is on oral vancomycin.      Interval History: NAEON    Review of Systems   Gastrointestinal: Positive for diarrhea.   Skin: Positive for wound.   All other systems reviewed and are negative.    Objective:     Vital Signs (Most Recent):  Temp: 98.6 °F (37 °C) (06/13/20 1100)  Pulse: 89 (06/13/20 0827)  Resp: 18 (06/13/20 0827)  BP: (!) 107/56 (06/13/20 1100)  SpO2: 99 % (06/13/20 1100) Vital Signs (24h Range):  Temp:  [97.9 °F (36.6 °C)-98.6 °F (37 °C)] 98.6 °F (37 °C)  Pulse:  [75-89] 89  Resp:  [16-18] 18  SpO2:  [99 %-100 %] 99 %  BP: (107-153)/(56-97) 107/56     Weight: 83 kg (182 lb 15.7 oz)  Body mass index is 24.82 kg/m².    Estimated Creatinine Clearance: 104.2 mL/min (based on SCr of 1.2 mg/dL).    Physical Exam  Vitals signs and nursing note reviewed.   Constitutional:       General: He is not in acute distress.     Appearance: He is well-developed and well-nourished. He is not diaphoretic.   HENT:      Head: Normocephalic and atraumatic.      Right Ear: External ear normal.      Left Ear: External ear normal.      Nose: Nose normal.      Mouth/Throat:      Mouth: Oropharynx is clear and moist.      Pharynx: No oropharyngeal exudate.   Eyes:      General: No scleral icterus.        Right eye: No discharge.         Left eye: No discharge.      Extraocular Movements: EOM normal.      Conjunctiva/sclera: Conjunctivae normal.      Pupils: Pupils are equal, round, and reactive to light.   Neck:      Musculoskeletal: Normal range of motion and neck supple.      Thyroid: No thyromegaly.      Vascular: No JVD.      Trachea: No  tracheal deviation.   Cardiovascular:      Rate and Rhythm: Normal rate and regular rhythm.      Pulses: Intact distal pulses.      Heart sounds: No murmur. No friction rub. No gallop.    Pulmonary:      Effort: Pulmonary effort is normal. No respiratory distress.      Breath sounds: Normal breath sounds. No stridor. No wheezing or rales.   Chest:      Chest wall: No tenderness.   Abdominal:      General: Bowel sounds are normal. There is no distension.      Palpations: Abdomen is soft. There is no mass.      Tenderness: There is no abdominal tenderness. There is no guarding or rebound.   Musculoskeletal: Normal range of motion.         General: No tenderness or edema.   Lymphadenopathy:      Cervical: No cervical adenopathy.   Skin:     General: Skin is warm.      Coloration: Skin is not pale.      Findings: No erythema or rash.      Comments: Right gluteal wound covered in dressings.  Patient won't let me examine it.   Neurological:      Mental Status: He is alert and oriented to person, place, and time.      Cranial Nerves: No cranial nerve deficit.      Motor: No abnormal muscle tone.      Coordination: Coordination normal.      Deep Tendon Reflexes: Reflexes are normal and symmetric. Reflexes normal.   Psychiatric:         Mood and Affect: Mood and affect normal.         Behavior: Behavior normal.         Thought Content: Thought content normal.         Judgment: Judgment normal.         Significant Labs: All pertinent labs within the past 24 hours have been reviewed.    Significant Imaging: I have reviewed all pertinent imaging results/findings within the past 24 hours.

## 2020-06-13 NOTE — ASSESSMENT & PLAN NOTE
Takes glargine 40 units daily and aspart 10 TID with meals. Recent admission to [facility] ICU for DKA. Upon this admission, beta-hydroxybutyrate normal without anion gap. Blood sugar 247.  - diabetic diet  - POCT BG ACHS  - goal -180 while inpatient  - insulin detemir 25 units BID  - aspart 15 TID with meals  - moderate dose sliding scale insulin PRN  - titrate insulin as appropriate  - encourage patient to notify nursing prior to unscheduled snacks

## 2020-06-13 NOTE — NURSING
"Pt asked this nurse to come back at 2100 to give the insulin, upon entering the room, noted Pt asleep, required scanning pt and informing how much this nurse administering. Upon calling her name "[place]" several times Pt woke up and upset being woke up. This nurse informed pt requested this nuse to returned at this time. Pt states to leave the insulin pen beside and will administer self or leave the room at once and call the chare now. This nurse tried to explained unable to leave the medication at the bedside, pt cursed out and says, "leave now, I do not need your explanation,"  Charge nurse notified.     "

## 2020-06-13 NOTE — ASSESSMENT & PLAN NOTE
23yo MTF transgender woman with PMH DM I, IBS, previous gluteal abscesses who presents with subjective and objective fevers and gluteal abscess for several days. She has also been experiencing episodes of involuntary diarrhea, which can occur as many as ten times per day,  by days without bowel movements, though she reports this has been ongoing for the past three years since she was diagnosed with IBS. In March of 2020, she had an abscess on her left inner buttock and left medial thigh. Cultures were positive for MRSA, e coli, and prevotella. She was discharged with a PICC line and completed course of vancomycin (3/2 - 3/27, was supposed to complete on 3/29 but discontinued 2 days early due to THEE), metronidazole 500 mg q8 (3/8 - 3/14), and ceftriaxone 2 g q24 (3/7 - 3/13). KEL in March of 2020 negative for vegetations. Recent admission to [facility] with gluteal abscess and fever. At that time, she received vancomycin, piperacillin-tazobactam, and clindamycin in the ED, and was later switched to vancomycin and ceftriaxone. Blood cultures drawn on 6/2 with no growth x5 days. Wound cultures not obtained. HIV and HCV negative on 6/2/2020. Upon current admission, afebrile, HR 90, -147 systolic (one-time BP of 105/50), SpO2 100% RA. Lactic acid on 6.7 was 1.7. There is less of a concern for sepsis currently. Etiology of repeated gluteal abscesses could be related to diarrhea as well as repeated receptive anal sex.     - follow-up blood cultures -NGTD  - follow-up wound cultures - Enterococcus and Ecoli sensitive to ampicillin-sulbactam  - ID consulted, recommend continuing ampicillin-sulbactam until intraoperative cultures result  - wound care  - S/p CRS debridement  - encouragement of anal sex hygiene

## 2020-06-13 NOTE — PROGRESS NOTES
[facility] Hospital Medicine  Progress Note    Patient Name: Pratik Torres  MRN: [id number]  Patient Class: IP- Inpatient   Admission Date: 6/7/2020  Length of Stay: 5 days  Attending Physician: Tonie Arzate MD  Primary Care Provider: Felipe Summers MD    [facility] Medicine Team: [facility] 5 Esther Mc MD    Subjective:     Principal Problem:Perirectal abscess        HPI:  Odessa Torres is a 24-year-old MTF transgender woman with PMH DM I, IBS, previous gluteal abscesses who presents with subjective and objective fevers and gluteal abscess for several days. She reports the abscess on her right buttock is tender only to palpation. She has also been experiencing episodes of involuntary diarrhea, which can occur as many as ten times per day,  by days without bowel movements, though she reports this has been ongoing for the past three years since she was diagnosed with IBS. She denies chest pain, shortness of breath, nausea, vomiting, myalgias, cough.     In March of 2020, she had an abscess on her left inner buttock and left medial thigh. Cultures were positive for MRSA, e coli, and prevotella. She was discharged with a PICC line and completed course of vancomycin (3/2 - 3/27, was supposed to complete on 3/29 but discontinued 2 days early due to THEE), metronidazole 500 mg q8 (3/8 - 3/14), and ceftriaxone 2 g q24 (3/7 - 3/13). KEL in March of 2020 negative for vegetations.     She was diagnosed with COVID-19 in April of 2020, at which point she was also treated with ceftriaxone for five days for klebsiella UTI.     She was recently admitted to [facility] in [place], where she presented with gluteal abscess and fever (101.9 in ED), during which she was admitted to the ICU for DKA and sepsis presumed to be secondary to gluteal abscess. At that time, she received vancomycin, piperacillin-tazobactam, and clindamycin in the ED, and was later switched to  "vancomycin and ceftriaxone. Blood cultures drawn on 6/2 with no growth x5 days. Wound cultures not obtained. HIV and HCV negative on 6/2/2020. Patient was evaluated by surgical team, who recommended further debridement in the OR, however, there was concern regarding positive COVID status. Per patient, left AMA due to dissatisfaction with her hospital experience. Per patient, was repeatedly referred to as "Ms. Saxena," rather than "Odessa," felt like people were talking about her disrespectfully in the hallways outside of her room, and was not given food for a prolonged period of time while waiting to undergo surgery, which had to be rescheduled. She then represented to the ED at [facility] for continued fevers.     Odessa does not smoke cigarettes, she drinks alcohol only at parties at most several days out of the month, has consumed edible marijuana, has used black marva, denies cocaine, heroin, or any IV drug use. She lived at home with her family in [place], though reports yesterday she became homeless as her family kicked her out. 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 She identifies as female (pronouns she, her), has oral and anal sex with men, uses condoms consistently, has a history of treated syphilis, and denies history of any other STIs, and reports no sexual activity in the past four months. She has received PrEP previously. She follows at [facility] for her estradiol and spironolactone. She takes glargine 40 units once daily and aspart 10 TID with meals. Denies allergies to medications, though shrimp makes her throat close up.     Upon admission, afebrile, HR 90, -147 systolic (one-time BP of 105/50), SpO2 100% RA. Hemoglobin stable at 9, creatinine 1, anion gap 6, beta-hydroxybutyrate 0.2, . Elevated inflammatory markers. COVID positive day prior.     Overview/Hospital Course:  Admitted to hospital medicine on 6/8 for perirectal abscess associated with fevers. Started on IV " vancomycin, ceftriaxone, and metronidazole. CT abdomen/pelvis on 6/8 showed 7 cm right perirectal abscess communicating with the skin surface. General surgery consulted and recommended colorectal surgery consult. CRS plan to take to OR on 6/10. Started basal-bolus insulin regimen and continued home estradiol and spironolactone. Ceftriaxone switched to cefepime given history of diabetes and desire to provide pseudomonal coverage. C diff positive so started PO vancomycin. Wound culture growing e coli and enterococcus. Changed IV antibiotics to ampicillin-sulbactam. S/P gluteal abscess I&D seen pus tracking down to sacrum. CRS believes that she may need flap down the road with plastic surgery. Patient had bump of Cr 1.1-> 1.5, thus held spironolactone and gave 1L fluid. Continuing ampicillin-sulbactam per ID recommendations pending intraoperative culture results - now growing anaerobic gram negative choco Parabacteroides distasonis.     Interval History: Odessa states that her mood is low today and states that she has felt this way in the past. Denies SI or HI. Reports right buttock wound is more tender to palpation today. Continues to experience diarrhea several times per day. Blood glucose remains elevated, but patient states she has had snacks at night.    Review of Systems   Constitutional: Negative for activity change, chills and fever.   Eyes: Negative for photophobia and visual disturbance.   Respiratory: Negative for cough, chest tightness and shortness of breath.    Cardiovascular: Negative for chest pain and leg swelling.   Gastrointestinal: Positive for diarrhea. Negative for abdominal pain, blood in stool, constipation, nausea and vomiting.        Perirectal pain   Endocrine: Negative for polydipsia and polyuria.   Genitourinary: Negative for difficulty urinating and dysuria.   Musculoskeletal: Negative for gait problem and myalgias.   Neurological: Negative for syncope and headaches.    Psychiatric/Behavioral: Negative for agitation and confusion.     Objective:     Vital Signs (Most Recent):  Temp: 98.6 °F (37 °C) (06/13/20 1100)  Pulse: 89 (06/13/20 0827)  Resp: 18 (06/13/20 0827)  BP: (!) 107/56 (06/13/20 1100)  SpO2: 99 % (06/13/20 1100) Vital Signs (24h Range):  Temp:  [97.9 °F (36.6 °C)-98.6 °F (37 °C)] 98.6 °F (37 °C)  Pulse:  [75-89] 89  Resp:  [16-18] 18  SpO2:  [99 %-100 %] 99 %  BP: (107-153)/(56-97) 107/56     Weight: 83 kg (182 lb 15.7 oz)  Body mass index is 24.82 kg/m².    Intake/Output Summary (Last 24 hours) at 6/13/2020 1317  Last data filed at 6/12/2020 1730  Gross per 24 hour   Intake 300 ml   Output --   Net 300 ml      Physical Exam  Constitutional:       General: She is not in acute distress.     Appearance: She is well-developed and well-nourished. She is not diaphoretic.      Comments: Appears more comfortable, sitting up in bed   HENT:      Head: Normocephalic and atraumatic.   Eyes:      General: No scleral icterus.     Extraocular Movements: EOM normal.   Neck:      Musculoskeletal: Normal range of motion and neck supple.   Cardiovascular:      Rate and Rhythm: Normal rate and regular rhythm.      Pulses: Intact distal pulses.      Heart sounds: No murmur.   Pulmonary:      Effort: Pulmonary effort is normal. No respiratory distress.      Breath sounds: No stridor. No wheezing.   Abdominal:      General: Bowel sounds are normal. There is no distension.      Palpations: Abdomen is soft.      Tenderness: There is no abdominal tenderness.   Musculoskeletal:         General: No tenderness or edema.   Skin:     General: Skin is warm and dry.   Neurological:      Mental Status: She is alert.      Comments: oriented     Significant Labs: All pertinent labs within the past 24 hours have been reviewed.    Significant Imaging: I have reviewed and interpreted all pertinent imaging results/findings within the past 24 hours.      Assessment/Plan:      * Perirectal abscess  25yo MTF  transgender woman with PMH DM I, IBS, previous gluteal abscesses who presents with subjective and objective fevers and gluteal abscess for several days. She has also been experiencing episodes of involuntary diarrhea, which can occur as many as ten times per day,  by days without bowel movements, though she reports this has been ongoing for the past three years since she was diagnosed with IBS. In March of 2020, she had an abscess on her left inner buttock and left medial thigh. Cultures were positive for MRSA, e coli, and prevotella. She was discharged with a PICC line and completed course of vancomycin (3/2 - 3/27, was supposed to complete on 3/29 but discontinued 2 days early due to THEE), metronidazole 500 mg q8 (3/8 - 3/14), and ceftriaxone 2 g q24 (3/7 - 3/13). KEL in March of 2020 negative for vegetations. Recent admission to [facility] with gluteal abscess and fever. At that time, she received vancomycin, piperacillin-tazobactam, and clindamycin in the ED, and was later switched to vancomycin and ceftriaxone. Blood cultures drawn on 6/2 with no growth x5 days. Wound cultures not obtained. HIV and HCV negative on 6/2/2020. Upon current admission, afebrile, HR 90, -147 systolic (one-time BP of 105/50), SpO2 100% RA. Lactic acid on 6.7 was 1.7. There is less of a concern for sepsis currently. Etiology of repeated gluteal abscesses could be related to diarrhea as well as repeated receptive anal sex. Wound cultures growing e coli sensitive to ampicillin-sulbactam. Intraoperative cultures growing gram negative choco Parabacteroides distasonis.      - follow-up blood cultures -NGTD  - follow-up wound cultures - E coli sensitive to ampicillin-sulbactam  - ID consulted, due to anaerobic culture continue on ampicillin-sulbactam while inpatient and switch to amoxicillin-clavulanate upon discharge to complete 14 day course of antibiotics from drainage  - per ID, would extend c diff treatment for 5 days after  ampicillin-sulbactam finishes to hopefully decrease chance of c diff recurrence   - ID to sign off  - wound care  - S/p CRS debridement  - encouragement of anal sex hygiene    Diarrhea  Clostridium difficile infection  Reports days with 10 episodes of involuntary loose stools as well as days without any bowel movements. C diff positive.  - PO vancomycin x 14 days  - ID consulted, appreciate recommendations    COVID-19 virus detected  SpO2 100% on RA.   - COVID-19 testing Collection Date: 6/7/2020 Collection Time:  11:33 AM  - Infection Control notified      - Isolation:   - Airborne, Contact and Droplet Precautions  - Cohort patients into COVID units  - N95 masks must be fit tested, wear eye protection  - 20 second hand hygiene              - Limit visitors per hospital policy              - Consolidating lab draws, nursing care, provider visits, and interventions    - Diagnostics: (leukopenia, hyponatremia, hyperferritinemia, elevated troponin, elevated d-dimer, age, and comorbidities are significant predictors of poor clinical outcome)  CBC and CMP    - Management:  Supplemental O2 to maintain SpO2 >92%  Continuous/intermittent Pulse Ox  Albuterol treatment PRN    Type 1 diabetes mellitus with hyperglycemia  Takes glargine 40 units daily and aspart 10 TID with meals. Recent admission to [facility] ICU for DKA. Upon this admission, beta-hydroxybutyrate normal without anion gap. Blood sugar 247.  - diabetic diet  - POCT BG ACHS  - goal -180 while inpatient  - insulin detemir 25 units BID  - aspart 15 TID with meals  - moderate dose sliding scale insulin PRN  - titrate insulin as appropriate  - encourage patient to notify nursing prior to unscheduled snacks    Anemia  Hemoglobin stable ~9 (12.2 in November of 2019). No active signs of bleeding. Normal pulse and normotensive. Ferritin 352. Likely anemia of chronic inflammation.     THEE (acute kidney injury)  CMP on the 6/11 showed Cr elevation of 1.1->1.5 while  receiving IV antibiotics. Creatinine improved to 1.2 on 6/12.    Plan  - CMP daily  - Spirolactone held but will consider restarting tomorrow if creatinine continues to improve     VTE Risk Mitigation (From admission, onward)         Ordered     enoxaparin injection 40 mg  Daily      06/12/20 1235     Place sequential compression device  Until discontinued      06/08/20 0719     IP VTE HIGH RISK PATIENT  Once      06/08/20 0658              Code status: full  DVT prophylaxis: enoxaparin 40  Diet: diabetic  Disposition: intraoperative culture sensitivities and antibiotic adjustment    Esther Mc MD  Department of Hospital Medicine   [facility]

## 2020-06-13 NOTE — ASSESSMENT & PLAN NOTE
25yo MTF transgender woman with PMH DM I, IBS, previous gluteal abscesses who presents with subjective and objective fevers and gluteal abscess for several days. She has also been experiencing episodes of involuntary diarrhea, which can occur as many as ten times per day,  by days without bowel movements, though she reports this has been ongoing for the past three years since she was diagnosed with IBS. In March of 2020, she had an abscess on her left inner buttock and left medial thigh. Cultures were positive for MRSA, e coli, and prevotella. She was discharged with a PICC line and completed course of vancomycin (3/2 - 3/27, was supposed to complete on 3/29 but discontinued 2 days early due to THEE), metronidazole 500 mg q8 (3/8 - 3/14), and ceftriaxone 2 g q24 (3/7 - 3/13). KEL in March of 2020 negative for vegetations. Recent admission to [facility] with gluteal abscess and fever. At that time, she received vancomycin, piperacillin-tazobactam, and clindamycin in the ED, and was later switched to vancomycin and ceftriaxone. Blood cultures drawn on 6/2 with no growth x5 days. Wound cultures not obtained. HIV and HCV negative on 6/2/2020. Upon current admission, afebrile, HR 90, -147 systolic (one-time BP of 105/50), SpO2 100% RA. Lactic acid on 6.7 was 1.7. There is less of a concern for sepsis currently. Etiology of repeated gluteal abscesses could be related to diarrhea as well as repeated receptive anal sex.     - follow-up blood cultures -NGTD  - follow-up wound cultures - Enterococcus and Ecoli sensitive to ampicillin-sulbactam  - ID consulted, recommend continuing ampicillin-sulbactam until intraoperative cultures result  - wound care  - S/p CRS debridement  - encouragement of anal sex hygiene

## 2020-06-13 NOTE — PROGRESS NOTES
[facility] Hospital Medicine  Progress Note    Patient Name: Pratik Torres  MRN: [id number]  Patient Class: IP- Inpatient   Admission Date: 6/7/2020  Length of Stay: 4 days  Attending Physician: Flower Saldivar MD  Primary Care Provider: Felipe Summers MD    [place] [facility] Team: [facility] 5 Esther Mc MD    Subjective:     Principal Problem:Perirectal abscess        HPI:  Odessa Torres is a 24-year-old MTF transgender woman with PMH DM I, IBS, previous gluteal abscesses who presents with subjective and objective fevers and gluteal abscess for several days. She reports the abscess on her right buttock is tender only to palpation. She has also been experiencing episodes of involuntary diarrhea, which can occur as many as ten times per day,  by days without bowel movements, though she reports this has been ongoing for the past three years since she was diagnosed with IBS. She denies chest pain, shortness of breath, nausea, vomiting, myalgias, cough.     In March of 2020, she had an abscess on her left inner buttock and left medial thigh. Cultures were positive for MRSA, e coli, and prevotella. She was discharged with a PICC line and completed course of vancomycin (3/2 - 3/27, was supposed to complete on 3/29 but discontinued 2 days early due to HTEE), metronidazole 500 mg q8 (3/8 - 3/14), and ceftriaxone 2 g q24 (3/7 - 3/13). KEL in March of 2020 negative for vegetations.     She was diagnosed with COVID-19 in April of 2020, at which point she was also treated with ceftriaxone for five days for klebsiella UTI.     She was recently admitted to [facility] in [place], where she presented with gluteal abscess and fever (101.9 in ED), during which she was admitted to the ICU for DKA and sepsis presumed to be secondary to gluteal abscess. At that time, she received vancomycin, piperacillin-tazobactam, and clindamycin in the ED, and was later switched to  "vancomycin and ceftriaxone. Blood cultures drawn on 6/2 with no growth x5 days. Wound cultures not obtained. HIV and HCV negative on 6/2/2020. Patient was evaluated by surgical team, who recommended further debridement in the OR, however, there was concern regarding positive COVID status. Per patient, left AMA due to dissatisfaction with her hospital experience. Per patient, was repeatedly referred to as "Ms. Saxena," rather than "Odessa," felt like people were talking about her disrespectfully in the hallways outside of her room, and was not given food for a prolonged period of time while waiting to undergo surgery, which had to be rescheduled. She then represented to the ED at [facility] for continued fevers.     Odessa does not smoke cigarettes, she drinks alcohol only at parties at most several days out of the month, has consumed edible marijuana, has used black marva, denies cocaine, heroin, or any IV drug use. She lived at home with her family in [place], though reports yesterday she became homeless as her family kicked her out. 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 She identifies as female (pronouns she, her), has oral and anal sex with men, uses condoms consistently, has a history of treated syphilis, and denies history of any other STIs, and reports no sexual activity in the past four months. She has received PrEP previously. She follows at [facility] for her estradiol and spironolactone. She takes glargine 40 units once daily and aspart 10 TID with meals. Denies allergies to medications, though shrimp makes her throat close up.     Upon admission, afebrile, HR 90, -147 systolic (one-time BP of 105/50), SpO2 100% RA. Hemoglobin stable at 9, creatinine 1, anion gap 6, beta-hydroxybutyrate 0.2, . Elevated inflammatory markers. COVID positive day prior.     Overview/Hospital Course:  Admitted to hospital medicine on 6/8 for perirectal abscess associated with fevers. Started on IV " vancomycin, ceftriaxone, and metronidazole. CT abdomen/pelvis on 6/8 showed 7 cm right perirectal abscess communicating with the skin surface. General surgery consulted and recommended colorectal surgery consult. CRS plan to take to OR on 6/10. Started basal-bolus insulin regimen and continued home estradiol and spironolactone. Ceftriaxone switched to cefepime given history of diabetes and desire to provide pseudomonal coverage. C diff positive so started PO vancomycin. Wound culture growing e coli and enterococcus. Changed IV antibiotics to ampicillin-sulbactam. S/P gluteal abscess I&D seen pus tracking down to sacrum. CRS believes that she may need flap down the road with plastic surgery. Patient had bump of Cr 1.1-> 1.5, thus held spironolactone and gave 1L fluid. Continuing ampicillin-sulbactam per ID recommendations pending intraoperative culture results.     Interval History: Odessa feels better today. Her right buttock is less tender. She deferred labs initially this morning, but they ended up being drawn later on with patient's permission. Reports approximately 5 bowel movements today.    Review of Systems   Constitutional: Negative for activity change, chills and fever.   Eyes: Negative for photophobia and visual disturbance.   Respiratory: Negative for cough, chest tightness and shortness of breath.    Cardiovascular: Negative for chest pain and leg swelling.   Gastrointestinal: Positive for diarrhea. Negative for abdominal pain, blood in stool, constipation, nausea and vomiting.        Perirectal pain   Endocrine: Negative for polydipsia and polyuria.   Genitourinary: Negative for difficulty urinating and dysuria.   Musculoskeletal: Negative for gait problem and myalgias.   Neurological: Negative for syncope and headaches.   Psychiatric/Behavioral: Negative for agitation and confusion.     Objective:     Vital Signs (Most Recent):  Temp: 98.4 °F (36.9 °C) (06/12/20 1657)  Pulse: 84 (06/12/20 1657)  Resp:  18 (06/12/20 1657)  BP: 136/85 (06/12/20 1657)  SpO2: 100 % (06/12/20 1657) Vital Signs (24h Range):  Temp:  [96 °F (35.6 °C)-98.4 °F (36.9 °C)] 98.4 °F (36.9 °C)  Pulse:  [66-96] 84  Resp:  [16-18] 18  SpO2:  [98 %-100 %] 100 %  BP: (109-136)/(61-85) 136/85     Weight: 83 kg (182 lb 15.7 oz)  Body mass index is 24.82 kg/m².    Intake/Output Summary (Last 24 hours) at 6/12/2020 1659  Last data filed at 6/12/2020 0700  Gross per 24 hour   Intake 450 ml   Output --   Net 450 ml      Physical Exam   Constitutional: She appears well-developed and well-nourished. No distress.   Appears more comfortable, sitting up in bed   HENT:   Head: Normocephalic and atraumatic.   Eyes: EOM are normal. No scleral icterus.   Neck: Normal range of motion. Neck supple.   Cardiovascular: Normal rate, regular rhythm and intact distal pulses.   No murmur heard.  Pulmonary/Chest: Effort normal. No stridor. No respiratory distress. She has no wheezes.   Abdominal: Soft. Bowel sounds are normal. She exhibits no distension. There is no tenderness.   Musculoskeletal: She exhibits no edema or tenderness.   Neurological: She is alert.   oriented   Skin: Skin is warm and dry. She is not diaphoretic.     Significant Labs: All pertinent labs within the past 24 hours have been reviewed.    Significant Imaging: I have reviewed and interpreted all pertinent imaging results/findings within the past 24 hours.      Assessment/Plan:      * Perirectal abscess  23yo MTF transgender woman with PMH DM I, IBS, previous gluteal abscesses who presents with subjective and objective fevers and gluteal abscess for several days. She has also been experiencing episodes of involuntary diarrhea, which can occur as many as ten times per day,  by days without bowel movements, though she reports this has been ongoing for the past three years since she was diagnosed with IBS. In March of 2020, she had an abscess on her left inner buttock and left medial thigh.  Cultures were positive for MRSA, e coli, and prevotella. She was discharged with a PICC line and completed course of vancomycin (3/2 - 3/27, was supposed to complete on 3/29 but discontinued 2 days early due to THEE), metronidazole 500 mg q8 (3/8 - 3/14), and ceftriaxone 2 g q24 (3/7 - 3/13). KEL in March of 2020 negative for vegetations. Recent admission to [facility] with gluteal abscess and fever. At that time, she received vancomycin, piperacillin-tazobactam, and clindamycin in the ED, and was later switched to vancomycin and ceftriaxone. Blood cultures drawn on 6/2 with no growth x5 days. Wound cultures not obtained. HIV and HCV negative on 6/2/2020. Upon current admission, afebrile, HR 90, -147 systolic (one-time BP of 105/50), SpO2 100% RA. Lactic acid on 6.7 was 1.7. There is less of a concern for sepsis currently. Etiology of repeated gluteal abscesses could be related to diarrhea as well as repeated receptive anal sex.     - follow-up blood cultures -NGTD  - follow-up wound cultures - Enterococcus and E coli sensitive to ampicillin-sulbactam  - ID consulted, recommend continuing ampicillin-sulbactam until intraoperative cultures result  - wound care  - S/p CRS debridement  - encouragement of anal sex hygiene    COVID-19 virus detected  SpO2 100% on RA.   - COVID-19 testing Collection Date: 6/7/2020 Collection Time:  11:33 AM  - Infection Control notified      - Isolation:   - Airborne, Contact and Droplet Precautions  - Cohort patients into COVID units  - N95 masks must be fit tested, wear eye protection  - 20 second hand hygiene              - Limit visitors per hospital policy              - Consolidating lab draws, nursing care, provider visits, and interventions    - Diagnostics: (leukopenia, hyponatremia, hyperferritinemia, elevated troponin, elevated d-dimer, age, and comorbidities are significant predictors of poor clinical outcome)  CBC and CMP    - Management:  Supplemental O2 to maintain SpO2  >92%  Continuous/intermittent Pulse Ox  Albuterol treatment PRN    Type 1 diabetes mellitus with hyperglycemia  Takes glargine 40 units daily and aspart 10 TID with meals. Recent admission to [facility] ICU for DKA. Upon this admission, beta-hydroxybutyrate normal without anion gap. Blood sugar 247.  - diabetic diet  - POCT BG ACHS  - goal -180 while inpatient  - insulin detemir 25 units BID  - aspart 15 TID with meals  - moderate dose sliding scale insulin PRN  - titrate insulin as appropriate    Anemia  Hemoglobin stable at 9 (12.2 in November of 2019). No active signs of bleeding. Normal pulse and normotensive. Iron studies consistent with iron deficiency anemia.  - consider supplementation upon discharge     THEE (acute kidney injury)  CMP on the 6/11 showed Cr elevation of 1.1->1.5 while receiving IV antibiotics. Creatinine improved to 1.2 on 6/12.    Plan  - CMP daily  - Spirolactone held but will consider restarting tomorrow if creatinine continues to improve     Diarrhea  Clostridium difficile infection  Reports days with 10 episodes of involuntary loose stools as well as days without any bowel movements. C diff positive.  - PO vancomycin x 14 days  - ID consulted, appreciate recommendations    VTE Risk Mitigation (From admission, onward)         Ordered     enoxaparin injection 40 mg  Daily      06/12/20 1235     Place sequential compression device  Until discontinued      06/08/20 0719     IP VTE HIGH RISK PATIENT  Once      06/08/20 0658              Code status: full  DVT prophylaxis: enoxaparin 40  Diet: diabetic  Disposition: intraoperative cultures and antibiotic adjustment    Esther Mc MD  Department of Hospital Medicine   [facility]

## 2020-06-13 NOTE — ASSESSMENT & PLAN NOTE
Takes glargine 40 units daily and aspart 10 TID with meals. Recent admission to [facility] ICU for DKA. Upon this admission, beta-hydroxybutyrate normal without anion gap. Blood sugar 247.  - diabetic diet  - POCT BG ACHS  - goal -180 while inpatient  - insulin detemir 25 units BID  - aspart 15 TID with meals  - moderate dose sliding scale insulin PRN  - titrate insulin as appropriate

## 2020-06-13 NOTE — ASSESSMENT & PLAN NOTE
CMP on the 6/11 showed Cr elevation of 1.1->1.5 while receiving IV antibiotics. Creatinine improved to 1.2 on 6/12.    Plan  - CMP daily  - Spirolactone held but will consider restarting tomorrow if creatinine continues to improve

## 2020-06-13 NOTE — SUBJECTIVE & OBJECTIVE
Interval History: Odessa states that her mood is low today and states that she has felt this way in the past. Denies SI or HI. Reports right buttock wound is more tender to palpation today. Continues to experience diarrhea several times per day. Blood glucose remains elevated, but patient states she has had snacks at night.    Review of Systems   Constitutional: Negative for activity change, chills and fever.   Eyes: Negative for photophobia and visual disturbance.   Respiratory: Negative for cough, chest tightness and shortness of breath.    Cardiovascular: Negative for chest pain and leg swelling.   Gastrointestinal: Positive for diarrhea. Negative for abdominal pain, blood in stool, constipation, nausea and vomiting.        Perirectal pain   Endocrine: Negative for polydipsia and polyuria.   Genitourinary: Negative for difficulty urinating and dysuria.   Musculoskeletal: Negative for gait problem and myalgias.   Neurological: Negative for syncope and headaches.   Psychiatric/Behavioral: Negative for agitation and confusion.     Objective:     Vital Signs (Most Recent):  Temp: 98.6 °F (37 °C) (06/13/20 1100)  Pulse: 89 (06/13/20 0827)  Resp: 18 (06/13/20 0827)  BP: (!) 107/56 (06/13/20 1100)  SpO2: 99 % (06/13/20 1100) Vital Signs (24h Range):  Temp:  [97.9 °F (36.6 °C)-98.6 °F (37 °C)] 98.6 °F (37 °C)  Pulse:  [75-89] 89  Resp:  [16-18] 18  SpO2:  [99 %-100 %] 99 %  BP: (107-153)/(56-97) 107/56     Weight: 83 kg (182 lb 15.7 oz)  Body mass index is 24.82 kg/m².    Intake/Output Summary (Last 24 hours) at 6/13/2020 1317  Last data filed at 6/12/2020 1730  Gross per 24 hour   Intake 300 ml   Output --   Net 300 ml      Physical Exam  Constitutional:       General: He is not in acute distress.     Appearance: He is well-developed and well-nourished. He is not diaphoretic.      Comments: Appears more comfortable, sitting up in bed   HENT:      Head: Normocephalic and atraumatic.   Eyes:      General: No scleral  icterus.     Extraocular Movements: EOM normal.   Neck:      Musculoskeletal: Normal range of motion and neck supple.   Cardiovascular:      Rate and Rhythm: Normal rate and regular rhythm.      Pulses: Intact distal pulses.      Heart sounds: No murmur.   Pulmonary:      Effort: Pulmonary effort is normal. No respiratory distress.      Breath sounds: No stridor. No wheezing.   Abdominal:      General: Bowel sounds are normal. There is no distension.      Palpations: Abdomen is soft.      Tenderness: There is no abdominal tenderness.   Musculoskeletal:         General: No tenderness or edema.   Skin:     General: Skin is warm and dry.   Neurological:      Mental Status: He is alert.      Comments: oriented       Significant Labs: All pertinent labs within the past 24 hours have been reviewed.    Significant Imaging: I have reviewed and interpreted all pertinent imaging results/findings within the past 24 hours.

## 2020-06-13 NOTE — ASSESSMENT & PLAN NOTE
23yo MTF transgender woman with PMH DM I, IBS, previous gluteal abscesses who presents with subjective and objective fevers and gluteal abscess for several days. She has also been experiencing episodes of involuntary diarrhea, which can occur as many as ten times per day,  by days without bowel movements, though she reports this has been ongoing for the past three years since she was diagnosed with IBS. In March of 2020, she had an abscess on her left inner buttock and left medial thigh. Cultures were positive for MRSA, e coli, and prevotella. She was discharged with a PICC line and completed course of vancomycin (3/2 - 3/27, was supposed to complete on 3/29 but discontinued 2 days early due to THEE), metronidazole 500 mg q8 (3/8 - 3/14), and ceftriaxone 2 g q24 (3/7 - 3/13). KEL in March of 2020 negative for vegetations. Recent admission to [facility] with gluteal abscess and fever. At that time, she received vancomycin, piperacillin-tazobactam, and clindamycin in the ED, and was later switched to vancomycin and ceftriaxone. Blood cultures drawn on 6/2 with no growth x5 days. Wound cultures not obtained. HIV and HCV negative on 6/2/2020. Upon current admission, afebrile, HR 90, -147 systolic (one-time BP of 105/50), SpO2 100% RA. Lactic acid on 6.7 was 1.7. There is less of a concern for sepsis currently. Etiology of repeated gluteal abscesses could be related to diarrhea as well as repeated receptive anal sex.     - follow-up blood cultures -NGTD  - follow-up wound cultures - Enterococcus and Ecoli sensitive to ampicillin-sulbactam  - ID consulted, recommend continuing ampicillin-sulbactam until intraoperative cultures result  - wound care  - S/p CRS debridement  - encouragement of anal sex hygiene

## 2020-06-13 NOTE — ASSESSMENT & PLAN NOTE
25yo MTF transgender woman with PMH DM I, IBS, previous gluteal abscesses who presents with subjective and objective fevers and gluteal abscess for several days. She has also been experiencing episodes of involuntary diarrhea, which can occur as many as ten times per day,  by days without bowel movements, though she reports this has been ongoing for the past three years since she was diagnosed with IBS. In March of 2020, she had an abscess on her left inner buttock and left medial thigh. Cultures were positive for MRSA, e coli, and prevotella. She was discharged with a PICC line and completed course of vancomycin (3/2 - 3/27, was supposed to complete on 3/29 but discontinued 2 days early due to THEE), metronidazole 500 mg q8 (3/8 - 3/14), and ceftriaxone 2 g q24 (3/7 - 3/13). KEL in March of 2020 negative for vegetations. Recent admission to [facility] with gluteal abscess and fever. At that time, she received vancomycin, piperacillin-tazobactam, and clindamycin in the ED, and was later switched to vancomycin and ceftriaxone. Blood cultures drawn on 6/2 with no growth x5 days. Wound cultures not obtained. HIV and HCV negative on 6/2/2020. Upon current admission, afebrile, HR 90, -147 systolic (one-time BP of 105/50), SpO2 100% RA. Lactic acid on 6.7 was 1.7. There is less of a concern for sepsis currently. Etiology of repeated gluteal abscesses could be related to diarrhea as well as repeated receptive anal sex.     - follow-up blood cultures -NGTD  - follow-up wound cultures - Enterococcus and Ecoli sensitive to ampicillin-sulbactam  - ID consulted, recommend continuing ampicillin-sulbactam until intraoperative cultures result  - wound care  - S/p CRS debridement  - encouragement of anal sex hygiene

## 2020-06-13 NOTE — ASSESSMENT & PLAN NOTE
Clostridium difficile infection  Reports days with 10 episodes of involuntary loose stools as well as days without any bowel movements. C diff positive.  - PO vancomycin x 14 days  - ID consulted, appreciate recommendations

## 2020-06-14 VITALS
BODY MASS INDEX: 24.79 KG/M2 | RESPIRATION RATE: 18 BRPM | HEIGHT: 72 IN | HEART RATE: 95 BPM | TEMPERATURE: 98 F | SYSTOLIC BLOOD PRESSURE: 104 MMHG | OXYGEN SATURATION: 99 % | WEIGHT: 183 LBS | DIASTOLIC BLOOD PRESSURE: 59 MMHG

## 2020-06-14 LAB
ALBUMIN SERPL BCP-MCNC: 2.3 G/DL (ref 3.5–5.2)
ALP SERPL-CCNC: 75 U/L (ref 55–135)
ALT SERPL W/O P-5'-P-CCNC: 10 U/L (ref 10–44)
ANION GAP SERPL CALC-SCNC: 6 MMOL/L (ref 8–16)
AST SERPL-CCNC: 14 U/L (ref 10–40)
BASOPHILS # BLD AUTO: 0.03 K/UL (ref 0–0.2)
BASOPHILS NFR BLD: 0.4 % (ref 0–1.9)
BILIRUB SERPL-MCNC: 0.2 MG/DL (ref 0.1–1)
BUN SERPL-MCNC: 13 MG/DL (ref 6–20)
CALCIUM SERPL-MCNC: 8.6 MG/DL (ref 8.7–10.5)
CHLORIDE SERPL-SCNC: 100 MMOL/L (ref 95–110)
CO2 SERPL-SCNC: 25 MMOL/L (ref 23–29)
CREAT SERPL-MCNC: 1.2 MG/DL (ref 0.5–1.4)
CRP SERPL-MCNC: 9.1 MG/L (ref 0–8.2)
D DIMER PPP IA.FEU-MCNC: 2.1 MG/L FEU
DIFFERENTIAL METHOD: ABNORMAL
EOSINOPHIL # BLD AUTO: 0.2 K/UL (ref 0–0.5)
EOSINOPHIL NFR BLD: 1.9 % (ref 0–8)
ERYTHROCYTE [DISTWIDTH] IN BLOOD BY AUTOMATED COUNT: 14.6 % (ref 11.5–14.5)
EST. GFR  (AFRICAN AMERICAN): >60 ML/MIN/1.73 M^2
EST. GFR  (NON AFRICAN AMERICAN): >60 ML/MIN/1.73 M^2
FERRITIN SERPL-MCNC: 225 NG/ML (ref 20–300)
GLUCOSE SERPL-MCNC: 366 MG/DL (ref 70–110)
HCT VFR BLD AUTO: 25 % (ref 40–54)
HGB BLD-MCNC: 7.9 G/DL (ref 14–18)
IMM GRANULOCYTES # BLD AUTO: 0.07 K/UL (ref 0–0.04)
IMM GRANULOCYTES NFR BLD AUTO: 0.9 % (ref 0–0.5)
LDH SERPL L TO P-CCNC: 138 U/L (ref 110–260)
LYMPHOCYTES # BLD AUTO: 3.1 K/UL (ref 1–4.8)
LYMPHOCYTES NFR BLD: 39.5 % (ref 18–48)
MAGNESIUM SERPL-MCNC: 1.6 MG/DL (ref 1.6–2.6)
MCH RBC QN AUTO: 27.1 PG (ref 27–31)
MCHC RBC AUTO-ENTMCNC: 31.6 G/DL (ref 32–36)
MCV RBC AUTO: 86 FL (ref 82–98)
MONOCYTES # BLD AUTO: 0.6 K/UL (ref 0.3–1)
MONOCYTES NFR BLD: 7.3 % (ref 4–15)
NEUTROPHILS # BLD AUTO: 3.9 K/UL (ref 1.8–7.7)
NEUTROPHILS NFR BLD: 50 % (ref 38–73)
NRBC BLD-RTO: 0 /100 WBC
PHOSPHATE SERPL-MCNC: 3.1 MG/DL (ref 2.7–4.5)
PLATELET # BLD AUTO: 388 K/UL (ref 150–350)
PMV BLD AUTO: 10.5 FL (ref 9.2–12.9)
POCT GLUCOSE: 299 MG/DL (ref 70–110)
POCT GLUCOSE: 357 MG/DL (ref 70–110)
POCT GLUCOSE: 43 MG/DL (ref 70–110)
POCT GLUCOSE: 52 MG/DL (ref 70–110)
POCT GLUCOSE: 97 MG/DL (ref 70–110)
POTASSIUM SERPL-SCNC: 4.1 MMOL/L (ref 3.5–5.1)
PROT SERPL-MCNC: 9.3 G/DL (ref 6–8.4)
RBC # BLD AUTO: 2.92 M/UL (ref 4.6–6.2)
SODIUM SERPL-SCNC: 131 MMOL/L (ref 136–145)
WBC # BLD AUTO: 7.77 K/UL (ref 3.9–12.7)

## 2020-06-14 PROCEDURE — 99239 PR HOSPITAL DISCHARGE DAY,>30 MIN: ICD-10-PCS | Mod: ,,, | Performed by: HOSPITALIST

## 2020-06-14 PROCEDURE — 99239 HOSP IP/OBS DSCHRG MGMT >30: CPT | Mod: ,,, | Performed by: HOSPITALIST

## 2020-06-14 PROCEDURE — 84100 ASSAY OF PHOSPHORUS: CPT

## 2020-06-14 PROCEDURE — 83615 LACTATE (LD) (LDH) ENZYME: CPT

## 2020-06-14 PROCEDURE — 25000003 PHARM REV CODE 250: Performed by: STUDENT IN AN ORGANIZED HEALTH CARE EDUCATION/TRAINING PROGRAM

## 2020-06-14 PROCEDURE — 80053 COMPREHEN METABOLIC PANEL: CPT

## 2020-06-14 PROCEDURE — 36415 COLL VENOUS BLD VENIPUNCTURE: CPT

## 2020-06-14 PROCEDURE — 82728 ASSAY OF FERRITIN: CPT

## 2020-06-14 PROCEDURE — 85379 FIBRIN DEGRADATION QUANT: CPT

## 2020-06-14 PROCEDURE — 63600175 PHARM REV CODE 636 W HCPCS: Performed by: STUDENT IN AN ORGANIZED HEALTH CARE EDUCATION/TRAINING PROGRAM

## 2020-06-14 PROCEDURE — 83735 ASSAY OF MAGNESIUM: CPT

## 2020-06-14 PROCEDURE — 94761 N-INVAS EAR/PLS OXIMETRY MLT: CPT

## 2020-06-14 PROCEDURE — 86140 C-REACTIVE PROTEIN: CPT

## 2020-06-14 PROCEDURE — 85025 COMPLETE CBC W/AUTO DIFF WBC: CPT

## 2020-06-14 RX ORDER — INSULIN ASPART 100 [IU]/ML
10 INJECTION, SOLUTION INTRAVENOUS; SUBCUTANEOUS
Status: DISCONTINUED | OUTPATIENT
Start: 2020-06-14 | End: 2020-06-14 | Stop reason: HOSPADM

## 2020-06-14 RX ORDER — AMOXICILLIN AND CLAVULANATE POTASSIUM 500; 125 MG/1; MG/1
1 TABLET, FILM COATED ORAL 3 TIMES DAILY
Qty: 30 TABLET | Refills: 0 | Status: CANCELLED | OUTPATIENT
Start: 2020-06-14 | End: 2020-06-24

## 2020-06-14 RX ORDER — AMOXICILLIN AND CLAVULANATE POTASSIUM 875; 125 MG/1; MG/1
1 TABLET, FILM COATED ORAL EVERY 12 HOURS
Qty: 20 TABLET | Refills: 0 | Status: SHIPPED | OUTPATIENT
Start: 2020-06-14 | End: 2020-06-25

## 2020-06-14 RX ORDER — VANCOMYCIN HYDROCHLORIDE 125 MG/1
125 CAPSULE ORAL EVERY 6 HOURS
Qty: 64 CAPSULE | Refills: 0 | Status: SHIPPED | OUTPATIENT
Start: 2020-06-14 | End: 2020-06-30

## 2020-06-14 RX ORDER — AMOXICILLIN AND CLAVULANATE POTASSIUM 875; 125 MG/1; MG/1
1 TABLET, FILM COATED ORAL EVERY 12 HOURS
Qty: 21 TABLET | Refills: 0 | Status: SHIPPED | OUTPATIENT
Start: 2020-06-14 | End: 2020-06-14 | Stop reason: SDUPTHER

## 2020-06-14 RX ORDER — INSULIN GLARGINE 100 [IU]/ML
25 INJECTION, SOLUTION SUBCUTANEOUS 2 TIMES DAILY
Qty: 15 ML | Refills: 0 | Status: SHIPPED | OUTPATIENT
Start: 2020-06-14 | End: 2020-06-14

## 2020-06-14 RX ORDER — SPIRONOLACTONE 25 MG/1
25 TABLET ORAL DAILY
Status: DISCONTINUED | OUTPATIENT
Start: 2020-06-15 | End: 2020-06-14 | Stop reason: HOSPADM

## 2020-06-14 RX ORDER — LOPERAMIDE HYDROCHLORIDE 2 MG/1
2 CAPSULE ORAL 2 TIMES DAILY
Qty: 60 CAPSULE | Refills: 0 | Status: SHIPPED | OUTPATIENT
Start: 2020-06-14 | End: 2020-07-14

## 2020-06-14 RX ORDER — INSULIN GLARGINE 100 [IU]/ML
50 INJECTION, SOLUTION SUBCUTANEOUS NIGHTLY
Qty: 15 ML | Refills: 0 | Status: SHIPPED | OUTPATIENT
Start: 2020-06-14 | End: 2022-12-05

## 2020-06-14 RX ADMIN — INSULIN ASPART 6 UNITS: 100 INJECTION, SOLUTION INTRAVENOUS; SUBCUTANEOUS at 08:06

## 2020-06-14 RX ADMIN — AMPICILLIN SODIUM AND SULBACTAM SODIUM 3 G: 2; 1 INJECTION, POWDER, FOR SOLUTION INTRAMUSCULAR; INTRAVENOUS at 05:06

## 2020-06-14 RX ADMIN — INSULIN DETEMIR 25 UNITS: 100 INJECTION, SOLUTION SUBCUTANEOUS at 08:06

## 2020-06-14 RX ADMIN — INSULIN ASPART 10 UNITS: 100 INJECTION, SOLUTION INTRAVENOUS; SUBCUTANEOUS at 06:06

## 2020-06-14 RX ADMIN — Medication 24 G: at 11:06

## 2020-06-14 RX ADMIN — VANCOMYCIN HYDROCHLORIDE 125 MG: KIT at 12:06

## 2020-06-14 RX ADMIN — MORPHINE SULFATE 4 MG: 4 INJECTION INTRAVENOUS at 09:06

## 2020-06-14 RX ADMIN — ESTRADIOL 1 MG: 1 TABLET ORAL at 09:06

## 2020-06-14 RX ADMIN — INSULIN ASPART 15 UNITS: 100 INJECTION, SOLUTION INTRAVENOUS; SUBCUTANEOUS at 08:06

## 2020-06-14 RX ADMIN — INSULIN ASPART 8 UNITS: 100 INJECTION, SOLUTION INTRAVENOUS; SUBCUTANEOUS at 06:06

## 2020-06-14 RX ADMIN — VANCOMYCIN HYDROCHLORIDE 125 MG: KIT at 05:06

## 2020-06-14 RX ADMIN — AMPICILLIN SODIUM AND SULBACTAM SODIUM 3 G: 2; 1 INJECTION, POWDER, FOR SOLUTION INTRAMUSCULAR; INTRAVENOUS at 11:06

## 2020-06-14 RX ADMIN — VANCOMYCIN HYDROCHLORIDE 125 MG: KIT at 06:06

## 2020-06-14 NOTE — ASSESSMENT & PLAN NOTE
25yo MTF transgender woman with PMH DM I, IBS, previous gluteal abscesses who presents with subjective and objective fevers and gluteal abscess for several days. She has also been experiencing episodes of involuntary diarrhea, which can occur as many as ten times per day,  by days without bowel movements, though she reports this has been ongoing for the past three years since she was diagnosed with IBS. In March of 2020, she had an abscess on her left inner buttock and left medial thigh. Cultures were positive for MRSA, e coli, and prevotella. She was discharged with a PICC line and completed course of vancomycin (3/2 - 3/27, was supposed to complete on 3/29 but discontinued 2 days early due to THEE), metronidazole 500 mg q8 (3/8 - 3/14), and ceftriaxone 2 g q24 (3/7 - 3/13). KEL in March of 2020 negative for vegetations. Recent admission to [facility] with gluteal abscess and fever. At that time, she received vancomycin, piperacillin-tazobactam, and clindamycin in the ED, and was later switched to vancomycin and ceftriaxone. Blood cultures drawn on 6/2 with no growth x5 days. Wound cultures not obtained. HIV and HCV negative on 6/2/2020. Upon current admission, afebrile, HR 90, -147 systolic (one-time BP of 105/50), SpO2 100% RA. Lactic acid on 6.7 was 1.7. There is less of a concern for sepsis currently. Etiology of repeated gluteal abscesses could be related to diarrhea as well as repeated receptive anal sex.     - follow-up blood cultures -NGTD  - follow-up wound cultures - Enterococcus and Ecoli sensitive to ampicillin-sulbactam  - ID consulted, recommend continuing ampicillin-sulbactam until intraoperative cultures result  - wound care  - S/p CRS debridement  - encouragement of anal sex hygiene

## 2020-06-14 NOTE — PROGRESS NOTES
and  were paged 5 times through the  and no call back , charge nurse Jaspreet notified  Pt is ready for discharge and need transportation .

## 2020-06-14 NOTE — PROGRESS NOTES
Pt refused to take her last dose of IV antibiotic before discharge ,IM 5 Dr. Santana notified and came to pt's room and talked to her but pt still refusing to take the med . MD said it is ok , pt is ready for discharge . Pt's prescriptions delivered  to her room, pt got instruction and supplies for her wound care as ordered .  on call paged to set up transportation.

## 2020-06-14 NOTE — SUBJECTIVE & OBJECTIVE
Interval History: Patient denies any pain today.     Review of Systems   Constitutional: Negative for activity change, chills and fever.   Eyes: Negative for photophobia and visual disturbance.   Respiratory: Negative for cough, chest tightness and shortness of breath.    Cardiovascular: Negative for chest pain and leg swelling.   Gastrointestinal: Positive for diarrhea. Negative for abdominal pain, blood in stool, constipation, nausea and vomiting.        Perirectal pain   Endocrine: Negative for polydipsia and polyuria.   Genitourinary: Negative for difficulty urinating and dysuria.   Musculoskeletal: Negative for gait problem and myalgias.   Neurological: Negative for syncope and headaches.   Psychiatric/Behavioral: Negative for agitation and confusion.     Objective:     Vital Signs (Most Recent):  Temp: 98.4 °F (36.9 °C) (06/14/20 0938)  Pulse: 95 (06/14/20 1212)  Resp: 18 (06/14/20 1212)  BP: (!) 104/59 (06/14/20 1212)  SpO2: 99 % (06/14/20 1023) Vital Signs (24h Range):  Temp:  [97.6 °F (36.4 °C)-98.4 °F (36.9 °C)] 98.4 °F (36.9 °C)  Pulse:  [] 95  Resp:  [16-18] 18  SpO2:  [98 %-100 %] 99 %  BP: (104-132)/(54-73) 104/59     Weight: 83 kg (182 lb 15.7 oz)  Body mass index is 24.82 kg/m².    Intake/Output Summary (Last 24 hours) at 6/14/2020 1648  Last data filed at 6/14/2020 0937  Gross per 24 hour   Intake 830 ml   Output --   Net 830 ml      Physical Exam  Constitutional:       General: He is not in acute distress.     Appearance: He is well-developed and well-nourished. He is not diaphoretic.      Comments: Appears more comfortable, sitting up in bed   HENT:      Head: Normocephalic and atraumatic.   Eyes:      General: No scleral icterus.     Extraocular Movements: EOM normal.   Neck:      Musculoskeletal: Normal range of motion and neck supple.   Cardiovascular:      Rate and Rhythm: Normal rate and regular rhythm.      Pulses: Intact distal pulses.      Heart sounds: No murmur.   Pulmonary:       Effort: Pulmonary effort is normal. No respiratory distress.      Breath sounds: No stridor. No wheezing.   Abdominal:      General: Bowel sounds are normal. There is no distension.      Palpations: Abdomen is soft.      Tenderness: There is no abdominal tenderness.   Musculoskeletal:         General: No tenderness or edema.   Skin:     General: Skin is warm and dry.   Neurological:      Mental Status: He is alert.      Comments: oriented       Significant Labs: All pertinent labs within the past 24 hours have been reviewed.    Significant Imaging: I have reviewed and interpreted all pertinent imaging results/findings within the past 24 hours.

## 2020-06-14 NOTE — ASSESSMENT & PLAN NOTE
Takes glargine 40 units daily and aspart 10 TID with meals. Recent admission to [facility] ICU for DKA. Upon this admission, beta-hydroxybutyrate normal without anion gap. Blood sugar 247.  - diabetic diet  - POCT BG ACHS  - goal -180 while inpatient  - insulin detemir 25 units BID  - aspart 15 TID with meals  - moderate dose sliding scale insulin PRN  - titrate insulin as appropriate  - encourage patient to notify nursing prior to unscheduled snacks

## 2020-06-14 NOTE — PLAN OF CARE
Pt is A,A,O x / stable V/S . Pt ambulated in the room independently , no falls or injuries . Pt's BG drops to <43 , pt refused the Glucose tab but she ate some snacks , BG rechecked 97. Pt remains asymptomatic . Pt discharged to home , pt refused to take the last dose of IV antibiotics . Pt received her prescriptions and wound care supplies . Waiting for  to set up transportation .

## 2020-06-14 NOTE — PROGRESS NOTES
Pt's BG 52 and rechecked result shows <43 , pt asymptomatic . Pt refused to take the PRN glucose tab because she does not  feel any symptoms and she said she will eat a snack . IM 5  Notified and he said try again to give the glucose tab and some juice . Will follow orders and keep monitoring .

## 2020-06-14 NOTE — PROGRESS NOTES
Pt went to the vending machine and got some snacks , pt told me to put the glucose tab at the bedside table , pt provided with apple juice too . Will continue monitoring .

## 2020-06-14 NOTE — PLAN OF CARE
3:49 PM    SW advised by nurse Vernon of delay in pt d/c due to pt needing wound care and iv abx prior to d/c.     SW advised nurse to contact on call if SW has left to secure pt ride home.     Alena Cabezas LMSW  Case Management Social Worker   [facility]

## 2020-06-14 NOTE — DISCHARGE SUMMARY
[facility] Hospital Medicine  Discharge Summary      Patient Name: Pratik Torres  MRN: [id number]  Admission Date: 6/7/2020  Hospital Length of Stay: 6 days  Discharge Date and Time:  06/14/2020 5:47 PM  Attending Physician: Tonie Arzate MD   Discharging Provider: Esther Mc MD  Primary Care Provider: Felipe Summers MD  [facility] Medicine Team: [facility] HOSP MED 5 Esther Mc MD    HPI:   Odessa Torres is a 24-year-old MTF transgender woman with PMH DM I, IBS, previous gluteal abscesses who presents with subjective and objective fevers and gluteal abscess for several days. She reports the abscess on her right buttock is tender only to palpation. She has also been experiencing episodes of involuntary diarrhea, which can occur as many as ten times per day,  by days without bowel movements, though she reports this has been ongoing for the past three years since she was diagnosed with IBS. She denies chest pain, shortness of breath, nausea, vomiting, myalgias, cough.     In March of 2020, she had an abscess on her left inner buttock and left medial thigh. Cultures were positive for MRSA, e coli, and prevotella. She was discharged with a PICC line and completed course of vancomycin (3/2 - 3/27, was supposed to complete on 3/29 but discontinued 2 days early due to THEE), metronidazole 500 mg q8 (3/8 - 3/14), and ceftriaxone 2 g q24 (3/7 - 3/13). KEL in March of 2020 negative for vegetations.     She was diagnosed with COVID-19 in April of 2020, at which point she was also treated with ceftriaxone for five days for klebsiella UTI.     She was recently admitted to [facility] in [place], where she presented with gluteal abscess and fever (101.9 in ED), during which she was admitted to the ICU for DKA and sepsis presumed to be secondary to gluteal abscess. At that time, she received vancomycin, piperacillin-tazobactam, and clindamycin in the ED, and was later switched to  "vancomycin and ceftriaxone. Blood cultures drawn on 6/2 with no growth x5 days. Wound cultures not obtained. HIV and HCV negative on 6/2/2020. Patient was evaluated by surgical team, who recommended further debridement in the OR, however, there was concern regarding positive COVID status. Per patient, left AMA due to dissatisfaction with her hospital experience. Per patient, was repeatedly referred to as "Ms. Saxena," rather than "Odessa," felt like people were talking about her disrespectfully in the hallways outside of her room, and was not given food for a prolonged period of time while waiting to undergo surgery, which had to be rescheduled. She then represented to the ED at [facility] for continued fevers.     Odessa does not smoke cigarettes, she drinks alcohol only at parties at most several days out of the month, has consumed edible marijuana, has used black marva, denies cocaine, heroin, or any IV drug use. She lived at home with her family in [place], though reports yesterday she became homeless as her family kicked her out. 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 She identifies as female (pronouns she, her), has oral and anal sex with men, uses condoms consistently, has a history of treated syphilis, and denies history of any other STIs, and reports no sexual activity in the past four months. She has received PrEP previously. She follows at [facility] for her estradiol and spironolactone. She takes glargine 40 units once daily and aspart 10 TID with meals. Denies allergies to medications, though shrimp makes her throat close up.     Upon admission, afebrile, HR 90, -147 systolic (one-time BP of 105/50), SpO2 100% RA. Hemoglobin stable at 9, creatinine 1, anion gap 6, beta-hydroxybutyrate 0.2, . Elevated inflammatory markers. COVID positive day prior.     Procedure(s) (LRB):  INCISION, ABSCESS, PERIRECTAL (N/A)  DEBRIDEMENT, WOUND ISCHIORECTAL (Right)      Hospital Course:   Admitted to " hospital medicine on 6/8 for perirectal abscess associated with fevers. Started on IV vancomycin, ceftriaxone, and metronidazole. CT abdomen/pelvis on 6/8 showed 7 cm right perirectal abscess communicating with the skin surface. General surgery consulted and recommended colorectal surgery consult. CRS plan to take to OR on 6/10. Started basal-bolus insulin regimen and continued home estradiol and spironolactone. Ceftriaxone switched to cefepime given history of diabetes and desire to provide pseudomonal coverage. C diff positive so started PO vancomycin. Wound culture grew e coli. Changed IV antibiotics to ampicillin-sulbactam. S/P gluteal abscess I&D on 6/10 with pus tracking down to sacrum. CRS believes that she may need flap down the road with plastic surgery. Patient had bump of Cr 1.1-> 1.5, thus held spironolactone and gave 1L fluid. Continued ampicillin-sulbactam per ID recommendations pending intraoperative culture results - now growing anaerobic gram negative choco Parabacteroides distasonis. ID recommends completing 14 day course of amoxicillin-clavulanate (start date after I&D) (last dose will be on 6/24) and continuing PO vancomycin for additional 5 days after finishing amox-clav for c difficile (last dose of PO vanc should be on 6/29). Patient will take long-acting insulin 50 units QHS and 10 units short-acting TID with meals. Wound care was consulted, however, patient would not allow wound care to assess and pack her wound. Spoke with patient multiple times to emphasize importance of wound care. She stated she understood the risks. She will follow-up with wound care clinic at [facility] in one week, with CRS Dr. Santo in one week, and patient has PCP appointment with Susanna Lind at [facility] on 6/17. Ambulatory referral placed for social work per patient's request. She continues to follow at [facility] for her spironolactone and estradiol. Reviewed medications, instructions, and appointments  with patient. All questions answered to her satisfaction. Patient given wound care supplies prior to discharge.    Interval History: Patient denies any pain today.   Review of Systems   Constitutional: Negative for activity change, chills and fever.   Eyes: Negative for photophobia and visual disturbance.   Respiratory: Negative for cough, chest tightness and shortness of breath.   Cardiovascular: Negative for chest pain and leg swelling.   Gastrointestinal: Positive for diarrhea. Negative for abdominal pain, blood in stool, constipation, nausea and vomiting.   Perirectal pain   Endocrine: Negative for polydipsia and polyuria.   Genitourinary: Negative for difficulty urinating and dysuria.   Musculoskeletal: Negative for gait problem and myalgias.   Neurological: Negative for syncope and headaches.   Psychiatric/Behavioral: Negative for agitation and confusion.     Objective:     Vital Signs (Most Recent):   Temp: 98.4 °F (36.9 °C) (06/14/20 0938)   Pulse: 95 (06/14/20 1212)   Resp: 18 (06/14/20 1212)   BP: (!) 104/59 (06/14/20 1212)   SpO2: 99 % (06/14/20 1023) Vital Signs (24h Range):   Temp: [97.6 °F (36.4 °C)-98.4 °F (36.9 °C)] 98.4 °F (36.9 °C)   Pulse: [] 95   Resp: [16-18] 18   SpO2: [98 %-100 %] 99 %   BP: (104-132)/(54-73) 104/59   Weight: 83 kg (182 lb 15.7 oz)   Body mass index is 24.82 kg/m².     Intake/Output Summary (Last 24 hours) at 6/14/2020 1648   Last data filed at 6/14/2020 0937       Gross per 24 hour   Intake 830 ml   Output --   Net 830 ml     Physical Exam   Constitutional:   General: He is not in acute distress.  Appearance: He is well-developed and well-nourished. He is not diaphoretic.   Comments: Appears more comfortable, sitting up in bed   HENT:   Head: Normocephalic and atraumatic.   Eyes:   General: No scleral icterus.  Extraocular Movements: EOM normal.   Neck:   Musculoskeletal: Normal range of motion and neck supple.   Cardiovascular:   Rate and Rhythm: Normal rate and  regular rhythm.   Pulses: Intact distal pulses.   Heart sounds: No murmur.   Pulmonary:   Effort: Pulmonary effort is normal. No respiratory distress.   Breath sounds: No stridor. No wheezing.   Abdominal:   General: Bowel sounds are normal. There is no distension.   Palpations: Abdomen is soft.   Tenderness: There is no abdominal tenderness.   Musculoskeletal:   General: No tenderness or edema.   Skin:   General: Skin is warm and dry.   Neurological:   Mental Status: He is alert.   Comments: oriented     Significant Labs: All pertinent labs within the past 24 hours have been reviewed.   Significant Imaging: I have reviewed and interpreted all pertinent imaging results/findings within the past 24 hours.    Consults:   Consults (From admission, onward)        Status Ordering Provider     Inpatient consult to Colorectal Surgery  Once     Provider:  (Not yet assigned)    Completed DEISY BRICE     Inpatient consult to General Surgery  Once     Provider:  (Not yet assigned)    Completed KANG VANCE     Inpatient consult to Infectious Diseases  Once     Provider:  (Not yet assigned)    Completed SHALA MANN     Inpatient consult to Social Work  Once     Provider:  (Not yet assigned)    Acknowledged JIMI MALDONADO     Pharmacy to dose Vancomycin consult  Once     Provider:  (Not yet assigned)    Completed SHALA MANN          * Perirectal abscess  23yo MTF transgender woman with PMH DM I, IBS, previous gluteal abscesses who presents with subjective and objective fevers and gluteal abscess for several days. She has also been experiencing episodes of involuntary diarrhea, which can occur as many as ten times per day,  by days without bowel movements, though she reports this has been ongoing for the past three years since she was diagnosed with IBS. In March of 2020, she had an abscess on her left inner buttock and left medial thigh. Cultures were positive for MRSA, e coli, and prevotella.  She was discharged with a PICC line and completed course of vancomycin (3/2 - 3/27, was supposed to complete on 3/29 but discontinued 2 days early due to THEE), metronidazole 500 mg q8 (3/8 - 3/14), and ceftriaxone 2 g q24 (3/7 - 3/13). KEL in March of 2020 negative for vegetations. Recent admission to [facility] with gluteal abscess and fever. At that time, she received vancomycin, piperacillin-tazobactam, and clindamycin in the ED, and was later switched to vancomycin and ceftriaxone. Blood cultures drawn on 6/2 with no growth x5 days. Wound cultures not obtained. HIV and HCV negative on 6/2/2020. Upon current admission, afebrile, HR 90, -147 systolic (one-time BP of 105/50), SpO2 100% RA. Lactic acid on 6.7 was 1.7. There was less concern for sepsis. Etiology of repeated gluteal abscesses could be related to diarrhea as well as repeated receptive anal sex. Blood cultures NGTD. Wound cultures growing ecoli sensitive to ampicillin-sulbactam. Intraoperative cultures growing anaerobic gram negative choco Parabacteroides distasonis.    - ID consulted, recommend continuing ampicillin-sulbactam until discharge, at which point will switch to PO amox-clav for total of 14 days after I&D (I&D on 6/10, will finish amox-clav on 6/24  - wound care - will follow-up in wound care clinic in one week  - encouragement of anal sex hygiene    COVID-19 virus detected  No longer meets criteria for isolation inpatient    Type 1 diabetes mellitus with hyperglycemia  Takes glargine 40 units daily and aspart 10 TID with meals. Recent admission to [facility] ICU for DKA. Upon this admission, beta-hydroxybutyrate normal without anion gap. Blood sugar 247.  - diabetic diet  - POCT BG ACHS  - insulin detemir 25 units BID - will switch to glargine 50 QHS  - aspart 15 TID with meals - given low BG on 6/14, will change to aspart 10 TID with meals and patient will follow-up with PCP  For adjustments within a few days  - patient states she has supplies and  insulin at home    Anemia  Hemoglobin stable at 9 (12.2 in November of 2019). No active signs of bleeding. Normal pulse and normotensive. Likely related to inflammation.    THEE (acute kidney injury)  CMP on the 6/11 showed Cr elevation of 1.1->1.5 while receiving IV antibiotics. Creatinine improved to 1.2 on 6/12.    Diarrhea  Clostridium difficile infection  Reports days with 10 episodes of involuntary loose stools as well as days without any bowel movements. C diff positive.  - PO vancomycin last dose on 6/29 (5 days after finishing amox-clav)    Final Active Diagnoses:    Diagnosis Date Noted POA    PRINCIPAL PROBLEM:  Perirectal abscess [K61.1] 06/10/2020 Yes    COVID-19 virus detected [U07.1] 06/08/2020 Yes    Type 1 diabetes mellitus with hyperglycemia [E10.65] 08/31/2018 Yes    Anemia [D64.9] 06/08/2020 Yes    THEE (acute kidney injury) [N17.9] 06/11/2020 No    Clostridium difficile infection [A49.8] 06/10/2020 Yes    Gluteal abscess [L02.31] 06/08/2020 Yes    Diarrhea [R19.7] 11/04/2019 Yes      Problems Resolved During this Admission:       Discharged Condition: stable    Disposition:     Follow Up:  Follow-up Information     Felipe Summers MD.    Specialty: Family Medicine  Why: Pt telephone visit scheduled  6/17/20 @0551  Contact information:  [address] [place] [address]  YUDI DENIS [facility] [id number]  [phone]                 Patient Instructions:      Ambulatory referral/consult to Social Work   Standing Status: Future   Referral Priority: Routine Referral Type: Consultation   Referral Reason: Specialty Services Required   Requested Specialty: Behavioral Health   Number of Visits Requested: 1     Ambulatory referral/consult to Internal Medicine   Standing Status: Future   Referral Priority: Routine Referral Type: Consultation   Referral Reason: Specialty Services Required   Requested Specialty: Internal Medicine   Number of Visits Requested: 1      Ambulatory referral/consult to Wound Clinic   Standing Status: Future   Referral Priority: Routine Referral Type: Consultation   Referral Reason: Specialty Services Required   Requested Specialty: Wound Care   Number of Visits Requested: 1     Ambulatory referral/consult to Colorectal Surgery   Standing Status: Future   Referral Priority: Routine Referral Type: Consultation   Referral Reason: Specialty Services Required   Referred to Provider: LANIE FORREST Requested Specialty: Colon and Rectal Surgery   Number of Visits Requested: 1     Diet diabetic     Notify your health care provider if you experience any of the following:  temperature >100.4     Notify your health care provider if you experience any of the following:  persistent nausea and vomiting or diarrhea     Notify your health care provider if you experience any of the following:  severe uncontrolled pain     Notify your health care provider if you experience any of the following:  redness, tenderness, or signs of infection (pain, swelling, redness, odor or green/yellow discharge around incision site)     Notify your health care provider if you experience any of the following:  difficulty breathing or increased cough     Notify your health care provider if you experience any of the following:  severe persistent headache     Notify your health care provider if you experience any of the following:  worsening rash     Notify your health care provider if you experience any of the following:  persistent dizziness, light-headedness, or visual disturbances     Notify your health care provider if you experience any of the following:  increased confusion or weakness     Notify your health care provider if you experience any of the following:     Activity as tolerated       Significant Diagnostic Studies: Labs: All labs within the past 24 hours have been reviewed  Microbiology: Wound Culture: see hospital course above    Pending Diagnostic Studies:     None          Medications:  Reconciled Home Medications:      Medication List      START taking these medications    amoxicillin-clavulanate 875-125mg 875-125 mg per tablet  Commonly known as: AUGMENTIN  Take 1 tablet by mouth every 12 (twelve) hours. for 21 doses     vancomycin 125 MG capsule  Commonly known as: VANCOCIN  Take 1 capsule (125 mg total) by mouth every 6 (six) hours. for 16 days        CHANGE how you take these medications    insulin glargine 100 units/mL (3mL) SubQ pen  Inject 50 Units into the skin every evening.  What changed: how much to take     loperamide 2 mg capsule  Commonly known as: IMODIUM  Take 1 capsule (2 mg total) by mouth 2 (two) times a day. Do not take this medication until you finish antibiotics and see your primary care doctor.  What changed: additional instructions        CONTINUE taking these medications    albuterol 90 mcg/actuation inhaler  Commonly known as: PROVENTIL/VENTOLIN HFA  Inhale 1-2 puffs into the lungs every 6 (six) hours as needed for Wheezing. Rescue     estradioL 1 MG tablet  Commonly known as: ESTRACE  Take 1 mg by mouth once daily.     insulin aspart U-100 100 unit/mL injection  Commonly known as: NOVOLOG  Inject 10 Units into the skin 3 (three) times daily before meals.     spironolactone 25 MG tablet  Commonly known as: ALDACTONE  Take 25 mg by mouth once daily.            Indwelling Lines/Drains at time of discharge:   Lines/Drains/Airways     None                 Time spent on the discharge of patient: 35 minutes  Patient was seen and examined on the date of discharge and determined to be suitable for discharge.         Esther Mc MD  Department of Hospital Medicine  [facility]

## 2020-06-14 NOTE — PLAN OF CARE
.   Pt is AAOx4. Pt remain free from fall and injuries. VS remain stable. Pain is managed with PRN morphine. Questions and concerns voiced and answered. Medication compliance. Remain on isolation. Call light in reach. Bed in low locked position. Side rails x2. Belongings at bedside.

## 2020-06-15 LAB
BACTERIA SPEC ANAEROBE CULT: ABNORMAL

## 2020-06-15 NOTE — PLAN OF CARE
Patient discharged home with no needs.        06/15/20 1125   Final Note   Assessment Type Final Discharge Note   Anticipated Discharge Disposition Home   Right Care Referral Info   Post Acute Recommendation No Care   Post-Acute Status   Other Status No Post-Acute Service Needs     Meghan Radford RN, CM   Ext: [phone]

## 2020-06-15 NOTE — PROGRESS NOTES
Discharge papers and instructions given and reviewed with pt and she verbalized understanding . Peripheral IV access removed still no call back from the  on call or the  on call . Pt said she can't wait and she will call [employer] . Pt left the floor and took all her belonging . Charge nurse notified .

## 2020-06-18 NOTE — PLAN OF CARE
6/18/20 @ 8:00 am  Case  called [facility] to schedule wound care appointment for the patient. [facility] had the first available appointment for 6.24.20 at 1pm. Case  called [facility] to see if they had sooner availability. [facility] could schedule a PCP telemedicine visit but referres their wound care needs to [facility]. Case  also called the [facility] wound care clinic to speak to an RN in the clinic to see if the patient could be seen sooner. No one answered at the wound care clinic but a message was left and awaiting a return call. Case  will continue to follow. Dr. Mc notified of all the above information.    6/18/20 @ 12:00  Case  called the [facility] wound care clinic and left another message asking for an appointment before the already scheduled 6.24.20 at 1 pm appointment. Case  also called the patient and left a voicemail regarding the appointment that has been made for 6.24.20 at 1 pm and asked for a return call to discuss the possibility of the appointment being rescheduled before that date. Case  will continue to follow.    6/18/20 @ 2:57  Case  called [facility] wound clinic and was able to speak with a staff member. Case  was able to get an appointment for the patient with Dr. Dunlap on 6.23.20 at 3:30 pm. Case  called the patient who answered the phone but then hung up. Case  called the patient back and left another voicemail with the above appointment information. Dr. Mc notified of all the above information via secure chat.

## 2020-06-23 ENCOUNTER — NURSE TRIAGE (OUTPATIENT)
Dept: ADMINISTRATIVE | Facility: CLINIC | Age: 25
End: 2020-06-23

## 2020-06-23 NOTE — TELEPHONE ENCOUNTER
Pt contacted through the Post Procedural Symptom Tracker. No answer. No additional contact today as per post procedure protocol. dy 13      Reason for Disposition   No answer.  First attempt to contact caller.  Follow-up call scheduled within 15 minutes.    Protocols used: NO CONTACT OR DUPLICATE CONTACT CALL-A-[place]

## 2020-06-24 NOTE — TELEPHONE ENCOUNTER
Spoke with patient on behalf of [facility]'s post procedure system tracker  Patient denies any Covid 19 symptoms

## 2020-07-13 LAB
FUNGUS SPEC CULT: ABNORMAL
FUNGUS SPEC CULT: ABNORMAL

## 2020-08-12 LAB
ACID FAST MOD KINY STN SPEC: NORMAL
ACID FAST MOD KINY STN SPEC: NORMAL
MYCOBACTERIUM SPEC QL CULT: NORMAL
MYCOBACTERIUM SPEC QL CULT: NORMAL

## 2020-11-13 ENCOUNTER — HOSPITAL ENCOUNTER (EMERGENCY)
Facility: HOSPITAL | Age: 25
Discharge: HOME | End: 2020-11-13
Attending: EMERGENCY MEDICINE
Payer: MEDICAID

## 2020-11-13 VITALS
DIASTOLIC BLOOD PRESSURE: 70 MMHG | RESPIRATION RATE: 18 BRPM | SYSTOLIC BLOOD PRESSURE: 140 MMHG | TEMPERATURE: 98 F | HEART RATE: 90 BPM | OXYGEN SATURATION: 98 %

## 2020-11-13 DIAGNOSIS — J02.9 PHARYNGITIS WITH VIRAL SYNDROME: Primary | ICD-10-CM

## 2020-11-13 DIAGNOSIS — R51.9 ACUTE NONINTRACTABLE HEADACHE, UNSPECIFIED HEADACHE TYPE: ICD-10-CM

## 2020-11-13 DIAGNOSIS — B34.9 PHARYNGITIS WITH VIRAL SYNDROME: Primary | ICD-10-CM

## 2020-11-13 DIAGNOSIS — R05.9 COUGH: ICD-10-CM

## 2020-11-13 LAB
CTP QC/QA: YES
POC MOLECULAR INFLUENZA A AGN: NEGATIVE
POC MOLECULAR INFLUENZA B AGN: NEGATIVE

## 2020-11-13 PROCEDURE — 99283 EMERGENCY DEPT VISIT LOW MDM: CPT | Mod: 25

## 2020-11-13 PROCEDURE — 87502 INFLUENZA DNA AMP PROBE: CPT

## 2020-11-13 PROCEDURE — 25000003 PHARM REV CODE 250: Performed by: EMERGENCY MEDICINE

## 2020-11-13 PROCEDURE — 99284 EMERGENCY DEPT VISIT MOD MDM: CPT | Mod: ,,, | Performed by: EMERGENCY MEDICINE

## 2020-11-13 PROCEDURE — 99284 PR EMERGENCY DEPT VISIT,LEVEL IV: ICD-10-PCS | Mod: ,,, | Performed by: EMERGENCY MEDICINE

## 2020-11-13 RX ORDER — ACETAMINOPHEN 325 MG/1
650 TABLET ORAL
Status: COMPLETED | OUTPATIENT
Start: 2020-11-13 | End: 2020-11-13

## 2020-11-13 RX ORDER — DIPHENHYDRAMINE HCL 25 MG
25 CAPSULE ORAL
Status: COMPLETED | OUTPATIENT
Start: 2020-11-13 | End: 2020-11-13

## 2020-11-13 RX ORDER — METOCLOPRAMIDE 10 MG/1
10 TABLET ORAL
Status: COMPLETED | OUTPATIENT
Start: 2020-11-13 | End: 2020-11-13

## 2020-11-13 RX ADMIN — ACETAMINOPHEN 650 MG: 325 TABLET ORAL at 09:11

## 2020-11-13 RX ADMIN — DIPHENHYDRAMINE HYDROCHLORIDE 25 MG: 25 CAPSULE ORAL at 09:11

## 2020-11-13 RX ADMIN — METOCLOPRAMIDE 10 MG: 10 TABLET ORAL at 09:11

## 2020-11-13 NOTE — Clinical Note
"Pratik Galarza" Melissa was seen and treated in our emergency department on 11/13/2020.  She may return to work on 11/14/2020.       If you have any questions or concerns, please don't hesitate to call.      Sierra German MD"

## 2020-11-14 NOTE — ED PROVIDER NOTES
Encounter Date: 11/13/2020    SCRIBE #1 NOTE: I, Torsten Mayen, am scribing for, and in the presence of,  Sierra German MD. I have scribed the following portions of the note - Other sections scribed: HPI ROS PE.       History     Chief Complaint   Patient presents with    Back Pain     X1 hour. Denies any injuries.     Headache     HPI   Ms. Torres is a 24 y.o. adult (transgender female on hormone) with a past medical history of DM I, acute kidney injury, C. Difficile infection, pancreatitis, previous COVID-19 infection, who presents to the ED with back pain and headache since 1.5 hours ago. She had a similar headache with associated fever 4 days ago. 2 days ago, that headache and fever had resolved. She states that prior to these two incidents, she has had similarly bad headaches. She feels like she currently has a cold and endorses cough/sore throat that began 3 days ago, not helped by cough drops. She has a wound on her foot that is healing fine. She denies any chills, vision changes, weakness, or lower back pain.     The history is provided by the patient and medical records.    Review of patient's allergies indicates:   Allergen Reactions    Shrimp      Past Medical History:   Diagnosis Date    Diabetes mellitus     IBS (irritable bowel syndrome)      Past Surgical History:   Procedure Laterality Date    COLONOSCOPY      INCISION OF PERIRECTAL ABSCESS N/A 6/10/2020    Procedure: INCISION, ABSCESS, PERIRECTAL;  Surgeon: Ronald Santo MD;  Location: [address];  Service: Colon and Rectal;  Laterality: N/A;  PATIENT IS COVID POSITIVE    TOE SURGERY      WOUND DEBRIDEMENT Right 6/10/2020    Procedure: DEBRIDEMENT, WOUND ISCHIORECTAL;  Surgeon: Ronald Santo MD;  Location: [facility] OR [address] [place] [address];  Service: Colon and Rectal;  Laterality: Right;     History reviewed. No pertinent family history.  Social History     Tobacco Use    Smoking status: Never Smoker    Smokeless tobacco: Never Used   Substance  Use Topics    Alcohol use: Yes     Comment: occasionally    Drug use: Not Currently     Review of Systems   Constitutional: Positive for fever (resolved). Negative for chills, diaphoresis and fatigue.   HENT: Positive for congestion and sore throat. Negative for rhinorrhea.    Eyes: Negative for visual disturbance.   Respiratory: Positive for cough. Negative for chest tightness and shortness of breath.    Cardiovascular: Negative for chest pain.   Gastrointestinal: Negative for abdominal pain, blood in stool, constipation, diarrhea and vomiting.   Genitourinary: Negative for dysuria, hematuria and urgency.   Musculoskeletal: Positive for back pain.   Skin: Positive for wound. Negative for rash.   Neurological: Positive for headaches. Negative for seizures, syncope and weakness.   Hematological: Does not bruise/bleed easily.   Psychiatric/Behavioral: Negative for agitation and hallucinations.       Physical Exam     Initial Vitals [11/13/20 2033]   BP Pulse Resp Temp SpO2   (!) 142/68 100 18 98.2 °F (36.8 °C) 100 %      MAP       --         Physical Exam    Nursing note and vitals reviewed.    Gen: AxOx3, NAD, well nourished, appears stated age  Eye: EOMI, no scleral icterus, no periorbital edema or ecchymosis  Head: normocephalic, atraumatic, no lesions, scalp appears normal  ENT: neck supple, no stridor, no masses, no drooling or voice changes  CVS: RRR, no m/r/g, distal pulses intact/symmetric  Pulm: CTAB, no wheezes, rales or rhonchi, no increased work of breathing  Abd: soft, nontender, nondistended, no organomegaly, no CVAT  Ext: no edema, no rashes, or deformity. Right foot immobilized in a hard-soled shoe.   Neuro: GCS15, moving all extremities, gait intact, face grossly symmetric  Psych: normal affect, cooperative, well groomed, makes good eye contact      ED Course   Procedures  Labs Reviewed   POCT INFLUENZA A/B MOLECULAR          Imaging Results          X-Ray Chest PA And Lateral (Final result)  Result  time 11/13/20 21:37:45    Final result by Yung Zaldivar MD (11/13/20 21:37:45)                 Impression:      No acute process.      Electronically signed by: Yung Zaldivar MD  Date:    11/13/2020  Time:    21:37             Narrative:    EXAMINATION:  XR CHEST PA AND LATERAL    CLINICAL HISTORY:  Cough    TECHNIQUE:  PA and lateral views of the chest were performed.    COMPARISON:  04/13/2020.    FINDINGS:  The trachea is unremarkable.  The cardiomediastinal silhouette is within normal limits.  The hilar structures are unremarkable.  The hemidiaphragms are within normal limits.  There is no evidence of free air beneath the hemidiaphragms.  There are no pleural effusions.  There is no evidence of a pneumothorax.  There is no evidence of pneumomediastinum.  No airspace opacity is present.  The osseous structures are unremarkable.                                 Medical Decision Making:   History:   Old Medical Records: I decided to obtain old medical records.  Initial Assessment:   This is a 24-year-old woman who presents with acute onset headache and upper back pain, with intermittent fevers for the past 5 days, and intermittent congestion.  There is also a cough that is not productive.  Given that she has diabetes, she is relatively immunosuppressed, so we will check a chest x-ray for pneumonia, though I have overall low suspicion.  I do not suspect meningitis given duration of symptoms and clinical well appearance.  Her blood sugar with EMS was 150, do not suspect DKA.  She has already had COVID, we will check a flu given its been in the community recently.  Clinical Tests:   Lab Tests: Ordered and Reviewed  Radiological Study: Ordered and Reviewed  ED Management:  Chest x-ray by my independent interpretation does not show any pneumonia.  Flu is negative.  She was discharged in stable condition with instructions to self quarantine, wash hands, and to rest.            Scribe Attestation:   Scribe #1: I performed  the above scribed service and the documentation accurately describes the services I performed. I attest to the accuracy of the note.                      Clinical Impression:     ICD-10-CM ICD-9-CM   1. Pharyngitis with viral syndrome  J02.9 462    B34.9 079.99   2. Cough  R05 786.2   3. Acute nonintractable headache, unspecified headache type  R51.9 784.0                          ED Disposition Condition    Discharge Stable        ED Prescriptions     None        Follow-up Information     Follow up With Specialties Details Why Contact Info    Felipe Summers MD Family Medicine Schedule an appointment as soon as possible for a visit   [address] [facility] [address]  [phone]      [facility] Emergency Medicine  If symptoms worsen [address] [place] [address] [id number] [phone]                                       Sierra German MD  11/13/20 [id number]

## 2020-11-14 NOTE — ED TRIAGE NOTES
Pratik Torres, an 24 y.o. adult presents to the ED c/o headache onset Monday. Back pain, shoulder and neck pain onset Tuesday. Pt reports sore throat.  Tested positive for COVID 2x    Chief Complaint   Patient presents with    Back Pain     X1 hour. Denies any injuries.     Headache     Review of patient's allergies indicates:   Allergen Reactions    Shrimp      Past Medical History:   Diagnosis Date    Diabetes mellitus        LOC: The patient is awake, alert, aware of environment with an appropriate affect. Oriented x4, speaking appropriately  APPEARANCE: Pt resting comfortably, in no acute distress, pt is clean and well groomed, clothing properly fastened  SKIN:The skin is warm and dry, color consistent with ethnicity, patient has normal skin turgor and moist mucus membranes  RESPIRATORY:Airway is open and patent, respirations are spontaneous, patient has a normal effort and rate, no accessory muscle use noted.  CARDIAC: fast rate and rhythm, no peripheral edema noted, capillary refill < 3 seconds, bilateral radial pulses 2+.  ABDOMEN: Soft, non tender, non distended.   NEUROLOGIC: PERRLA, facial expression is symmetrical, patient moving all extremities spontaneously, normal sensation in all extremities when touched with a finger.  Follows all commands appropriately  MUSCULOSKELETAL: Patient moving all extremities spontaneously, no obvious swelling or deformities noted.

## 2020-11-14 NOTE — ED NOTES
Pt reporting significant relief in pain since receiving medication; currently rating pain as a 2/10.

## 2022-06-16 NOTE — ASSESSMENT & PLAN NOTE
CMP on the 6/11 showed Cr elevation of 1.1->1.5. Possible effect from procedure he has yesterday. Patient does not appear to be septic.     Plan  - Continue trend CMP  - 1L NS bolus   - Holding spirolactone    Patient reporting left groin pain x 2.5 weeks after doing kettle bell exercises and spin bike.  Suggested to the ED by his doctor.

## 2022-12-05 ENCOUNTER — HOSPITAL ENCOUNTER (INPATIENT)
Facility: HOSPITAL | Age: 27
LOS: 7 days | Discharge: HOME HEALTH | DRG: 474 | End: 2022-12-12
Attending: EMERGENCY MEDICINE | Admitting: STUDENT IN AN ORGANIZED HEALTH CARE EDUCATION/TRAINING PROGRAM
Payer: MEDICARE

## 2022-12-05 DIAGNOSIS — R07.9 CHEST PAIN: ICD-10-CM

## 2022-12-05 DIAGNOSIS — T87.89 PRESSURE ULCER OF BKA STUMP: ICD-10-CM

## 2022-12-05 DIAGNOSIS — S81.801A OPEN WOUND OF RIGHT LOWER LEG: ICD-10-CM

## 2022-12-05 DIAGNOSIS — R50.9 FEVER: ICD-10-CM

## 2022-12-05 DIAGNOSIS — M86.061 ACUTE HEMATOGENOUS OSTEOMYELITIS OF RIGHT TIBIA: Primary | ICD-10-CM

## 2022-12-05 DIAGNOSIS — B95.4 BACTERIAL INFECTION DUE TO STREPTOCOCCUS, GROUP G: ICD-10-CM

## 2022-12-05 DIAGNOSIS — A41.9 SEPSIS: ICD-10-CM

## 2022-12-05 DIAGNOSIS — L89.899 PRESSURE ULCER OF BKA STUMP: ICD-10-CM

## 2022-12-05 DIAGNOSIS — I38 ENDOCARDITIS: ICD-10-CM

## 2022-12-05 DIAGNOSIS — T81.30XA WOUND DEHISCENCE: ICD-10-CM

## 2022-12-05 PROBLEM — Z79.899 TRANSGENDER WOMAN ON HORMONE THERAPY: Status: ACTIVE | Noted: 2022-12-05

## 2022-12-05 PROBLEM — F64.0 TRANSGENDER WOMAN ON HORMONE THERAPY: Status: ACTIVE | Noted: 2022-12-05

## 2022-12-05 PROBLEM — D50.9 MICROCYTIC ANEMIA: Status: ACTIVE | Noted: 2020-06-08

## 2022-12-05 PROBLEM — J18.9 PNEUMONIA: Status: ACTIVE | Noted: 2022-12-05

## 2022-12-05 PROBLEM — M86.9 OSTEOMYELITIS OF TIBIA: Status: ACTIVE | Noted: 2022-12-05

## 2022-12-05 LAB
ABO + RH BLD: NORMAL
ALBUMIN SERPL BCP-MCNC: 1.6 G/DL (ref 3.5–5.2)
ALP SERPL-CCNC: 116 U/L (ref 55–135)
ALT SERPL W/O P-5'-P-CCNC: 9 U/L (ref 10–44)
ANION GAP SERPL CALC-SCNC: 7 MMOL/L (ref 8–16)
ANISOCYTOSIS BLD QL SMEAR: SLIGHT
AST SERPL-CCNC: 26 U/L (ref 10–40)
BASOPHILS # BLD AUTO: 0.01 K/UL (ref 0–0.2)
BASOPHILS NFR BLD: 0.1 % (ref 0–1.9)
BILIRUB SERPL-MCNC: 0.8 MG/DL (ref 0.1–1)
BLD GP AB SCN CELLS X3 SERPL QL: NORMAL
BLD PROD TYP BPU: NORMAL
BLOOD UNIT EXPIRATION DATE: NORMAL
BLOOD UNIT TYPE CODE: 5100
BLOOD UNIT TYPE: NORMAL
BUN SERPL-MCNC: 24 MG/DL (ref 6–20)
CALCIUM SERPL-MCNC: 8.4 MG/DL (ref 8.7–10.5)
CHLORIDE SERPL-SCNC: 99 MMOL/L (ref 95–110)
CO2 SERPL-SCNC: 22 MMOL/L (ref 23–29)
CODING SYSTEM: NORMAL
CREAT SERPL-MCNC: 2.1 MG/DL (ref 0.5–1.4)
CTP QC/QA: YES
CTP QC/QA: YES
DIFFERENTIAL METHOD: ABNORMAL
DISPENSE STATUS: NORMAL
EOSINOPHIL # BLD AUTO: 0 K/UL (ref 0–0.5)
EOSINOPHIL NFR BLD: 0 % (ref 0–8)
ERYTHROCYTE [DISTWIDTH] IN BLOOD BY AUTOMATED COUNT: 14.5 % (ref 11.5–14.5)
EST. GFR  (NO RACE VARIABLE): 43 ML/MIN/1.73 M^2
GLUCOSE SERPL-MCNC: 62 MG/DL (ref 70–110)
HCT VFR BLD AUTO: 19.5 % (ref 40–54)
HGB BLD-MCNC: 5.9 G/DL (ref 14–18)
HYPOCHROMIA BLD QL SMEAR: ABNORMAL
IMM GRANULOCYTES # BLD AUTO: 0.08 K/UL (ref 0–0.04)
IMM GRANULOCYTES NFR BLD AUTO: 0.9 % (ref 0–0.5)
LACTATE SERPL-SCNC: 1 MMOL/L (ref 0.5–2.2)
LIPASE SERPL-CCNC: 6 U/L (ref 4–60)
LYMPHOCYTES # BLD AUTO: 0.8 K/UL (ref 1–4.8)
LYMPHOCYTES NFR BLD: 8.7 % (ref 18–48)
MCH RBC QN AUTO: 22.4 PG (ref 27–31)
MCHC RBC AUTO-ENTMCNC: 30.3 G/DL (ref 32–36)
MCV RBC AUTO: 74 FL (ref 82–98)
MONOCYTES # BLD AUTO: 0.4 K/UL (ref 0.3–1)
MONOCYTES NFR BLD: 5 % (ref 4–15)
NEUTROPHILS # BLD AUTO: 7.5 K/UL (ref 1.8–7.7)
NEUTROPHILS NFR BLD: 85.3 % (ref 38–73)
NRBC BLD-RTO: 0 /100 WBC
NUM UNITS TRANS PACKED RBC: NORMAL
OVALOCYTES BLD QL SMEAR: ABNORMAL
PLATELET # BLD AUTO: 209 K/UL (ref 150–450)
PMV BLD AUTO: 10.7 FL (ref 9.2–12.9)
POC MOLECULAR INFLUENZA A AGN: NEGATIVE
POC MOLECULAR INFLUENZA B AGN: NEGATIVE
POCT GLUCOSE: 78 MG/DL (ref 70–110)
POIKILOCYTOSIS BLD QL SMEAR: SLIGHT
POLYCHROMASIA BLD QL SMEAR: ABNORMAL
POTASSIUM SERPL-SCNC: 4.3 MMOL/L (ref 3.5–5.1)
PROCALCITONIN SERPL IA-MCNC: 25.04 NG/ML
PROT SERPL-MCNC: 8.9 G/DL (ref 6–8.4)
RBC # BLD AUTO: 2.63 M/UL (ref 4.6–6.2)
SARS-COV-2 RDRP RESP QL NAA+PROBE: NEGATIVE
SCHISTOCYTES BLD QL SMEAR: PRESENT
SODIUM SERPL-SCNC: 128 MMOL/L (ref 136–145)
TROPONIN I SERPL DL<=0.01 NG/ML-MCNC: 0.02 NG/ML (ref 0–0.03)
WBC # BLD AUTO: 8.75 K/UL (ref 3.9–12.7)

## 2022-12-05 PROCEDURE — 84484 ASSAY OF TROPONIN QUANT: CPT | Performed by: EMERGENCY MEDICINE

## 2022-12-05 PROCEDURE — 87147 CULTURE TYPE IMMUNOLOGIC: CPT | Mod: 59 | Performed by: EMERGENCY MEDICINE

## 2022-12-05 PROCEDURE — 83690 ASSAY OF LIPASE: CPT | Performed by: EMERGENCY MEDICINE

## 2022-12-05 PROCEDURE — 93010 EKG 12-LEAD: ICD-10-PCS | Mod: ,,, | Performed by: INTERNAL MEDICINE

## 2022-12-05 PROCEDURE — 86920 COMPATIBILITY TEST SPIN: CPT | Performed by: STUDENT IN AN ORGANIZED HEALTH CARE EDUCATION/TRAINING PROGRAM

## 2022-12-05 PROCEDURE — 83605 ASSAY OF LACTIC ACID: CPT | Performed by: EMERGENCY MEDICINE

## 2022-12-05 PROCEDURE — 86920 COMPATIBILITY TEST SPIN: CPT | Performed by: EMERGENCY MEDICINE

## 2022-12-05 PROCEDURE — 96375 TX/PRO/DX INJ NEW DRUG ADDON: CPT

## 2022-12-05 PROCEDURE — 87040 BLOOD CULTURE FOR BACTERIA: CPT | Performed by: EMERGENCY MEDICINE

## 2022-12-05 PROCEDURE — 93010 ELECTROCARDIOGRAM REPORT: CPT | Mod: ,,, | Performed by: INTERNAL MEDICINE

## 2022-12-05 PROCEDURE — 87635 SARS-COV-2 COVID-19 AMP PRB: CPT | Performed by: EMERGENCY MEDICINE

## 2022-12-05 PROCEDURE — 99291 CRITICAL CARE FIRST HOUR: CPT | Mod: 25

## 2022-12-05 PROCEDURE — 82962 GLUCOSE BLOOD TEST: CPT

## 2022-12-05 PROCEDURE — 84145 PROCALCITONIN (PCT): CPT | Performed by: EMERGENCY MEDICINE

## 2022-12-05 PROCEDURE — 12000002 HC ACUTE/MED SURGE SEMI-PRIVATE ROOM

## 2022-12-05 PROCEDURE — 86920 COMPATIBILITY TEST SPIN: CPT | Performed by: ANESTHESIOLOGY

## 2022-12-05 PROCEDURE — 85025 COMPLETE CBC W/AUTO DIFF WBC: CPT | Performed by: EMERGENCY MEDICINE

## 2022-12-05 PROCEDURE — 96365 THER/PROPH/DIAG IV INF INIT: CPT

## 2022-12-05 PROCEDURE — 25000003 PHARM REV CODE 250: Performed by: EMERGENCY MEDICINE

## 2022-12-05 PROCEDURE — 80053 COMPREHEN METABOLIC PANEL: CPT | Performed by: EMERGENCY MEDICINE

## 2022-12-05 PROCEDURE — 93005 ELECTROCARDIOGRAM TRACING: CPT

## 2022-12-05 PROCEDURE — 96361 HYDRATE IV INFUSION ADD-ON: CPT

## 2022-12-05 PROCEDURE — 36430 TRANSFUSION BLD/BLD COMPNT: CPT

## 2022-12-05 PROCEDURE — 96366 THER/PROPH/DIAG IV INF ADDON: CPT

## 2022-12-05 PROCEDURE — P9016 RBC LEUKOCYTES REDUCED: HCPCS | Performed by: EMERGENCY MEDICINE

## 2022-12-05 PROCEDURE — 27000207 HC ISOLATION

## 2022-12-05 PROCEDURE — 63600175 PHARM REV CODE 636 W HCPCS: Performed by: EMERGENCY MEDICINE

## 2022-12-05 PROCEDURE — 87502 INFLUENZA DNA AMP PROBE: CPT

## 2022-12-05 PROCEDURE — 87186 SC STD MICRODIL/AGAR DIL: CPT | Performed by: EMERGENCY MEDICINE

## 2022-12-05 PROCEDURE — 86901 BLOOD TYPING SEROLOGIC RH(D): CPT | Performed by: EMERGENCY MEDICINE

## 2022-12-05 RX ORDER — SODIUM CHLORIDE 0.9 % (FLUSH) 0.9 %
10 SYRINGE (ML) INJECTION EVERY 12 HOURS PRN
Status: DISCONTINUED | OUTPATIENT
Start: 2022-12-05 | End: 2022-12-12 | Stop reason: HOSPADM

## 2022-12-05 RX ORDER — ONDANSETRON 2 MG/ML
4 INJECTION INTRAMUSCULAR; INTRAVENOUS
Status: COMPLETED | OUTPATIENT
Start: 2022-12-05 | End: 2022-12-05

## 2022-12-05 RX ORDER — POLYETHYLENE GLYCOL 3350 17 G/17G
17 POWDER, FOR SOLUTION ORAL DAILY
Status: DISCONTINUED | OUTPATIENT
Start: 2022-12-06 | End: 2022-12-12 | Stop reason: HOSPADM

## 2022-12-05 RX ORDER — IBUPROFEN 200 MG
24 TABLET ORAL
Status: DISCONTINUED | OUTPATIENT
Start: 2022-12-05 | End: 2022-12-12 | Stop reason: HOSPADM

## 2022-12-05 RX ORDER — HYDROCODONE BITARTRATE AND ACETAMINOPHEN 500; 5 MG/1; MG/1
TABLET ORAL
Status: DISCONTINUED | OUTPATIENT
Start: 2022-12-05 | End: 2022-12-06

## 2022-12-05 RX ORDER — LANOLIN ALCOHOL/MO/W.PET/CERES
800 CREAM (GRAM) TOPICAL
Status: DISCONTINUED | OUTPATIENT
Start: 2022-12-05 | End: 2022-12-12 | Stop reason: HOSPADM

## 2022-12-05 RX ORDER — IBUPROFEN 200 MG
16 TABLET ORAL
Status: DISCONTINUED | OUTPATIENT
Start: 2022-12-05 | End: 2022-12-12 | Stop reason: HOSPADM

## 2022-12-05 RX ORDER — HEPARIN SODIUM 5000 [USP'U]/ML
5000 INJECTION, SOLUTION INTRAVENOUS; SUBCUTANEOUS EVERY 8 HOURS
Status: DISCONTINUED | OUTPATIENT
Start: 2022-12-05 | End: 2022-12-12 | Stop reason: HOSPADM

## 2022-12-05 RX ORDER — ESTRADIOL 1 MG/1
1 TABLET ORAL DAILY
Status: DISCONTINUED | OUTPATIENT
Start: 2022-12-06 | End: 2022-12-12 | Stop reason: HOSPADM

## 2022-12-05 RX ORDER — ACETAMINOPHEN 500 MG
1000 TABLET ORAL
Status: COMPLETED | OUTPATIENT
Start: 2022-12-05 | End: 2022-12-05

## 2022-12-05 RX ORDER — GLUCAGON 1 MG
1 KIT INJECTION
Status: DISCONTINUED | OUTPATIENT
Start: 2022-12-05 | End: 2022-12-12 | Stop reason: HOSPADM

## 2022-12-05 RX ORDER — SIMETHICONE 80 MG
1 TABLET,CHEWABLE ORAL 4 TIMES DAILY PRN
Status: DISCONTINUED | OUTPATIENT
Start: 2022-12-05 | End: 2022-12-12 | Stop reason: HOSPADM

## 2022-12-05 RX ORDER — MAG HYDROX/ALUMINUM HYD/SIMETH 200-200-20
30 SUSPENSION, ORAL (FINAL DOSE FORM) ORAL 4 TIMES DAILY PRN
Status: DISCONTINUED | OUTPATIENT
Start: 2022-12-05 | End: 2022-12-12 | Stop reason: HOSPADM

## 2022-12-05 RX ORDER — IPRATROPIUM BROMIDE AND ALBUTEROL SULFATE 2.5; .5 MG/3ML; MG/3ML
3 SOLUTION RESPIRATORY (INHALATION) EVERY 4 HOURS PRN
Status: DISCONTINUED | OUTPATIENT
Start: 2022-12-05 | End: 2022-12-12 | Stop reason: HOSPADM

## 2022-12-05 RX ORDER — EMTRICITABINE AND TENOFOVIR ALAFENAMIDE 200; 25 MG/1; MG/1
1 TABLET ORAL DAILY
COMMUNITY
Start: 2022-06-27

## 2022-12-05 RX ORDER — INSULIN ASPART 100 [IU]/ML
10 INJECTION, SOLUTION INTRAVENOUS; SUBCUTANEOUS
Status: DISCONTINUED | OUTPATIENT
Start: 2022-12-06 | End: 2022-12-12 | Stop reason: HOSPADM

## 2022-12-05 RX ORDER — INSULIN ASPART 100 [IU]/ML
0-5 INJECTION, SOLUTION INTRAVENOUS; SUBCUTANEOUS
Status: DISCONTINUED | OUTPATIENT
Start: 2022-12-05 | End: 2022-12-12 | Stop reason: HOSPADM

## 2022-12-05 RX ORDER — ONDANSETRON 2 MG/ML
4 INJECTION INTRAMUSCULAR; INTRAVENOUS EVERY 8 HOURS PRN
Status: DISCONTINUED | OUTPATIENT
Start: 2022-12-05 | End: 2022-12-12 | Stop reason: HOSPADM

## 2022-12-05 RX ORDER — SPIRONOLACTONE 25 MG/1
25 TABLET ORAL DAILY
Status: DISCONTINUED | OUTPATIENT
Start: 2022-12-06 | End: 2022-12-12

## 2022-12-05 RX ORDER — BISACODYL 10 MG
10 SUPPOSITORY, RECTAL RECTAL DAILY PRN
Status: DISCONTINUED | OUTPATIENT
Start: 2022-12-05 | End: 2022-12-12 | Stop reason: HOSPADM

## 2022-12-05 RX ORDER — NALOXONE HCL 0.4 MG/ML
0.02 VIAL (ML) INJECTION
Status: DISCONTINUED | OUTPATIENT
Start: 2022-12-05 | End: 2022-12-12 | Stop reason: HOSPADM

## 2022-12-05 RX ORDER — SODIUM,POTASSIUM PHOSPHATES 280-250MG
2 POWDER IN PACKET (EA) ORAL
Status: DISCONTINUED | OUTPATIENT
Start: 2022-12-05 | End: 2022-12-12 | Stop reason: HOSPADM

## 2022-12-05 RX ORDER — TALC
6 POWDER (GRAM) TOPICAL NIGHTLY PRN
Status: DISCONTINUED | OUTPATIENT
Start: 2022-12-05 | End: 2022-12-12 | Stop reason: HOSPADM

## 2022-12-05 RX ORDER — ACETAMINOPHEN 325 MG/1
650 TABLET ORAL EVERY 4 HOURS PRN
Status: DISCONTINUED | OUTPATIENT
Start: 2022-12-05 | End: 2022-12-12 | Stop reason: HOSPADM

## 2022-12-05 RX ORDER — PROCHLORPERAZINE EDISYLATE 5 MG/ML
5 INJECTION INTRAMUSCULAR; INTRAVENOUS EVERY 6 HOURS PRN
Status: DISCONTINUED | OUTPATIENT
Start: 2022-12-05 | End: 2022-12-12 | Stop reason: HOSPADM

## 2022-12-05 RX ORDER — ACETAMINOPHEN 325 MG/1
650 TABLET ORAL EVERY 8 HOURS PRN
Status: DISCONTINUED | OUTPATIENT
Start: 2022-12-05 | End: 2022-12-12 | Stop reason: HOSPADM

## 2022-12-05 RX ORDER — INSULIN ASPART 100 [IU]/ML
10 INJECTION, SOLUTION INTRAVENOUS; SUBCUTANEOUS
Status: DISCONTINUED | OUTPATIENT
Start: 2022-12-06 | End: 2022-12-05

## 2022-12-05 RX ORDER — HYDROCODONE BITARTRATE AND ACETAMINOPHEN 5; 325 MG/1; MG/1
1 TABLET ORAL EVERY 6 HOURS PRN
Status: DISCONTINUED | OUTPATIENT
Start: 2022-12-05 | End: 2022-12-07

## 2022-12-05 RX ADMIN — VANCOMYCIN HYDROCHLORIDE 2000 MG: 500 INJECTION, POWDER, LYOPHILIZED, FOR SOLUTION INTRAVENOUS at 05:12

## 2022-12-05 RX ADMIN — SODIUM CHLORIDE, SODIUM LACTATE, POTASSIUM CHLORIDE, AND CALCIUM CHLORIDE 2940 ML: .6; .31; .03; .02 INJECTION, SOLUTION INTRAVENOUS at 04:12

## 2022-12-05 RX ADMIN — ONDANSETRON 4 MG: 2 INJECTION INTRAMUSCULAR; INTRAVENOUS at 04:12

## 2022-12-05 RX ADMIN — PIPERACILLIN AND TAZOBACTAM 4.5 G: 4; .5 INJECTION, POWDER, LYOPHILIZED, FOR SOLUTION INTRAVENOUS; PARENTERAL at 05:12

## 2022-12-05 RX ADMIN — ACETAMINOPHEN 1000 MG: 500 TABLET ORAL at 04:12

## 2022-12-05 NOTE — ED TRIAGE NOTES
"Pt reports to the ED BIB EMS with C/O generalized body aches, N/V, SOB, fever, and right leg pain from a possible infection to the BKA wound site. Pt reports "I have been throwing up and feeling really bad these past few days." Pt febrile at 103.1 upon arrival. Pt tachy in the 130's, resp elevated at rest. Pt AAOx4. ED workup in progress, bed in low locked position, SR up x2 monitors on in place. NAD noted. Will monitor.   "

## 2022-12-05 NOTE — ED PROVIDER NOTES
Encounter Date: 12/5/2022       History     Chief Complaint   Patient presents with    Fever     EMS called to 26yo male that has a fever of 103.1 at triage and has been having ongoing issues with a wound on his right below knee amputation. Hr and RR elevated/      27 you male with DM, Irritable bowel syndrome presents with one day N/V. No diarrhea. States BS was 108 today.  Did not realize he had a fever. Fever 103.1 at triage. Pt reports some cough and cold symptoms. Also reports chronic right wound to leg amputations site. Wound care doctor at [facility]. States he ran out of wound care supplies so current wet to dry has been in place since Tuesday.     Review of patient's allergies indicates:   Allergen Reactions    Shrimp      Past Medical History:   Diagnosis Date    Diabetes mellitus     IBS (irritable bowel syndrome)      Past Surgical History:   Procedure Laterality Date    COLONOSCOPY      INCISION OF PERIRECTAL ABSCESS N/A 6/10/2020    Procedure: INCISION, ABSCESS, PERIRECTAL;  Surgeon: Ronald Santo MD;  Location: [facility] OR [address] [place] [address];  Service: Colon and Rectal;  Laterality: N/A;  PATIENT IS COVID POSITIVE    TOE SURGERY      WOUND DEBRIDEMENT Right 6/10/2020    Procedure: DEBRIDEMENT, WOUND ISCHIORECTAL;  Surgeon: Ronald Santo MD;  Location: [facility] OR [address] [place] [address];  Service: Colon and Rectal;  Laterality: Right;     History reviewed. No pertinent family history.  Social History     Tobacco Use    Smoking status: Never    Smokeless tobacco: Never   Substance Use Topics    Alcohol use: Yes     Comment: occasionally    Drug use: Not Currently     Review of Systems   Constitutional:  Negative for fever.   HENT:  Negative for sore throat.    Eyes:  Negative for visual disturbance.   Respiratory:  Positive for cough. Negative for shortness of breath.    Cardiovascular:  Negative for chest pain.   Gastrointestinal:  Positive for nausea and vomiting. Negative for abdominal pain.   Genitourinary:  Negative for difficulty  urinating and dysuria.   Musculoskeletal:  Negative for back pain.   Skin:  Negative for rash.   Neurological:  Negative for headaches.     Physical Exam     Initial Vitals [12/05/22 1604]   BP Pulse Resp Temp SpO2   (!) 111/48 (!) 140 (!) 26 (!) 103.1 °F (39.5 °C) 99 %      MAP       --         Physical Exam    Nursing note and vitals reviewed.  Constitutional: She appears well-developed and well-nourished.   Eyes: EOM are normal. Pupils are equal, round, and reactive to light.   Neck: Neck supple. No thyromegaly present. No JVD present.   Normal range of motion.  Cardiovascular:  Intact distal pulses.     Exam reveals no gallop and no friction rub.       No murmur heard.  Tachycardia    Pulmonary/Chest: Breath sounds normal. No respiratory distress.   Abdominal: Abdomen is soft. Bowel sounds are normal. There is no abdominal tenderness. There is no rebound and no guarding.   Musculoskeletal:         General: No tenderness or edema. Normal range of motion.      Cervical back: Normal range of motion and neck supple.      Comments: Right lower stump has large wound with overriding built up granulation tissue  that appears clean and would need to be debrided back down to heal. There is also a right lateral posterior wound to the stump. Small gauze removed with exposed bone underneath and smell of pseudomonas.      Neurological: She is alert and oriented to person, place, and time. She has normal strength.   Skin: Skin is warm and dry.       ED Course   Procedures  Labs Reviewed   CBC W/ AUTO DIFFERENTIAL - Abnormal; Notable for the following components:       Result Value    RBC 2.63 (*)     Hemoglobin 5.9 (*)     Hematocrit 19.5 (*)     MCV 74 (*)     MCH 22.4 (*)     MCHC 30.3 (*)     Immature Granulocytes 0.9 (*)     Immature Grans (Abs) 0.08 (*)     Lymph # 0.8 (*)     Gran % 85.3 (*)     Lymph % 8.7 (*)     All other components within normal limits    Narrative:      Hemoglobin and hematocrit critical  result(s) called and verbal   readback obtained from Dank Prabhakar RN. by [facility] 12/05/2022 17:39   COMPREHENSIVE METABOLIC PANEL - Abnormal; Notable for the following components:    Sodium 128 (*)     CO2 22 (*)     Glucose 62 (*)     BUN 24 (*)     Creatinine 2.1 (*)     Calcium 8.4 (*)     Total Protein 8.9 (*)     Albumin 1.6 (*)     ALT 9 (*)     Anion Gap 7 (*)     eGFR 43 (*)     All other components within normal limits   PROCALCITONIN - Abnormal; Notable for the following components:    Procalcitonin 25.04 (*)     All other components within normal limits   CULTURE, BLOOD   CULTURE, BLOOD   CULTURE, RESPIRATORY   CLOSTRIDIUM DIFFICILE   LACTIC ACID, PLASMA   TROPONIN I   LIPASE   URINALYSIS, REFLEX TO URINE CULTURE   CBC W/ AUTO DIFFERENTIAL   LACTIC ACID, PLASMA   BASIC METABOLIC PANEL   HEMOGLOBIN A1C   LEGIONELLA ANTIGEN, URINE RANDOM   POCT INFLUENZA A/B MOLECULAR   SARS-COV-2 RDRP GENE   SARS-COV-2 RDRP GENE   TYPE & SCREEN   POCT GLUCOSE   POCT GLUCOSE MONITORING CONTINUOUS   PREPARE RBC SOFT          Imaging Results              X-Ray Tibia Fibula 2 View Right (Final result)  Result time 12/05/22 20:50:13      Final result by Gerhard Tolliver MD (12/05/22 20:50:13)                   Impression:      Osteomyelitis at the distal aspect of the tibia and fibula amputation site.      Electronically signed by: Gerhard Tolliver MD  Date:    12/05/2022  Time:    20:50               Narrative:    EXAMINATION:  XR TIBIA FIBULA 2 VIEW RIGHT    CLINICAL HISTORY:  Unspecified open wound, right lower leg, initial encounter    TECHNIQUE:  AP and lateral views of the right tibia and fibula were performed.    COMPARISON:  None.    FINDINGS:  Postsurgical changes of below-knee amputation are seen.  Abnormal cortical irregularity and indistinctness are seen involving the distal aspect of the tibia and fibula consistent with osteomyelitis.  Soft tissue swelling and soft tissue air are seen at the stump.  No acute displaced  fracture or dislocation seen.                                       CT Chest Abdomen Pelvis Without Contrast (XPD) (Final result)  Result time 12/05/22 20:20:04      Final result by Gerhard Tolliver MD (12/05/22 20:20:04)                   Impression:      1. Multifocal patchy opacities seen within the lower lobes, left greater than right.  Findings may be seen with aspiration or infectious process/developing pneumonia in the right clinical setting.  No confluent consolidation seen.  2. Scattered liquid stool seen within the colon which may be seen with diarrheal illness.  3. Hepatosplenomegaly.      Electronically signed by: Gerhard Tolliver MD  Date:    12/05/2022  Time:    20:20               Narrative:    EXAMINATION:  CT CHEST ABDOMEN PELVIS WITHOUT CONTRAST(XPD)    CLINICAL HISTORY:  Sepsis;    TECHNIQUE:  CT chest abdomen and pelvis was obtained without the use of IV contrast.    COMPARISON:  None    FINDINGS:  Aorta is nonaneurysmal.  Heart is mildly enlarged with no significant pericardial effusion.  No significant abnormalities are seen along the esophageal course.  Major airways are patent.  Lungs are symmetrically expanded.  Mild patchy opacities are seen within the lower lobes, more pronounced on the left.  These have somewhat nodular configuration seen within the left lower lobe.  No confluent focal consolidation seen.  No pleural effusion.    Liver is enlarged measuring 21 cm.  No significant hepatic abnormality seen on this noncontrast exam.  There is no intra-or extrahepatic biliary ductal dilatation.  Gallbladder is contracted or has been removed.  Spleen is enlarged measuring 15 cm.  Stomach, pancreas, and adrenal glands show no significant abnormalities.    Kidneys show no evidence of stones or hydronephrosis. Ureters are unable to be tracked.  Urinary bladder and prostate are unremarkable.    Appendix is not definitely visualized, however no abnormalities or inflammatory changes are seen in the  region.  Scattered liquid stool is seen within the colon.  No evidence of bowel obstruction.  No free air or free fluid.    Aorta tapers normally.    No acute osseous abnormality identified. Subcutaneous soft tissues show no significant abnormalities.                                       X-Ray Chest AP Portable (Final result)  Result time 12/05/22 18:04:54      Final result by Gerhard Tolliver MD (12/05/22 18:04:54)                   Impression:      Hypoinflated lungs.  No acute cardiopulmonary process identified.      Electronically signed by: Gerhard Tolliver MD  Date:    12/05/2022  Time:    18:04               Narrative:    EXAMINATION:  XR CHEST AP PORTABLE    CLINICAL HISTORY:  Sepsis;    TECHNIQUE:  Single frontal view of the chest was performed.    COMPARISON:  November 2020.    FINDINGS:  Lungs are significantly hypoinflated which accentuates cardiac silhouette and pulmonary vascular markings.  No evidence of focal consolidative process, pneumothorax, or significant pleural effusion.  No acute osseous abnormality identified.                                       X-Ray Femur Ap/Lat Right (Final result)  Result time 12/05/22 18:21:18      Final result by Gerhard Tolliver MD (12/05/22 18:21:18)                   Impression:      No acute osseous abnormality identified.      Electronically signed by: Gerhard Tolliver MD  Date:    12/05/2022  Time:    18:21               Narrative:    EXAMINATION:  XR FEMUR 2 VIEW RIGHT    CLINICAL HISTORY:  Disruption of wound, unspecified, initial encounter    TECHNIQUE:  AP and lateral views of the right femur were performed.    COMPARISON:  None    FINDINGS:  No evidence of acute displaced fracture, dislocation, or osseous destructive process.  Hip and knee joint spaces are preserved.                                       Medications   vancomycin - pharmacy to dose (has no administration in time range)   vancomycin 1.5 g in dextrose 5 % 250 mL IVPB (ready to mix) (1,500 mg  Intravenous Trough Due As Scheduled Before Dose 12/7/22 1700)   0.9%  NaCl infusion (for blood administration) (has no administration in time range)   spironolactone tablet 25 mg (has no administration in time range)   insulin detemir U-100 pen 60 Units (has no administration in time range)   estradioL tablet 1 mg (has no administration in time range)   sodium chloride 0.9% flush 10 mL (has no administration in time range)   heparin (porcine) injection 5,000 Units (has no administration in time range)   albuterol-ipratropium 2.5 mg-0.5 mg/3 mL nebulizer solution 3 mL (has no administration in time range)   melatonin tablet 6 mg (has no administration in time range)   ondansetron injection 4 mg (has no administration in time range)   prochlorperazine injection Soln 5 mg (has no administration in time range)   polyethylene glycol packet 17 g (has no administration in time range)   bisacodyL suppository 10 mg (has no administration in time range)   acetaminophen tablet 650 mg (has no administration in time range)   simethicone chewable tablet 80 mg (has no administration in time range)   aluminum-magnesium hydroxide-simethicone 200-200-20 mg/5 mL suspension 30 mL (has no administration in time range)   acetaminophen tablet 650 mg (has no administration in time range)   HYDROcodone-acetaminophen 5-325 mg per tablet 1 tablet (has no administration in time range)   naloxone 0.4 mg/mL injection 0.02 mg (has no administration in time range)   potassium bicarbonate disintegrating tablet 50 mEq (has no administration in time range)   potassium bicarbonate disintegrating tablet 35 mEq (has no administration in time range)   potassium bicarbonate disintegrating tablet 60 mEq (has no administration in time range)   magnesium oxide tablet 800 mg (has no administration in time range)   magnesium oxide tablet 800 mg (has no administration in time range)   potassium, sodium phosphates 280-160-250 mg packet 2 packet (has no  administration in time range)   potassium, sodium phosphates 280-160-250 mg packet 2 packet (has no administration in time range)   potassium, sodium phosphates 280-160-250 mg packet 2 packet (has no administration in time range)   glucose chewable tablet 16 g (has no administration in time range)   glucose chewable tablet 24 g (has no administration in time range)   glucagon (human recombinant) injection 1 mg (has no administration in time range)   dextrose 10% bolus 125 mL (has no administration in time range)   dextrose 10% bolus 250 mL (has no administration in time range)   piperacillin-tazobactam 4.5 g in dextrose 5 % 100 mL IVPB (ready to mix system) (has no administration in time range)   insulin aspart U-100 pen 10 Units (has no administration in time range)   insulin aspart U-100 pen 0-5 Units (has no administration in time range)   lactated ringers bolus 2,940 mL (0 mLs Intravenous Stopped 12/5/22 2104)   ondansetron injection 4 mg (4 mg Intravenous Given 12/5/22 1641)   acetaminophen tablet 1,000 mg (1,000 mg Oral Given 12/5/22 1641)   piperacillin-tazobactam 4.5 g in dextrose 5 % 100 mL IVPB (ready to mix system) (0 g Intravenous Stopped 12/5/22 1743)   vancomycin (VANCOCIN) 2,000 mg in dextrose 5 % 500 mL IVPB (0 mg Intravenous Stopped 12/5/22 2010)   Sepsis workup initiated and pt to be turned over to 5 PM ER MD  Medical Decision Making:   ED Management:  Dr. Sierra Addendum:  Patient care taken over from Dr. De Jesus pending lab and imaging results.  Labs notable for anemia with hemoglobin 5.9.  Patient denies noticing bleeding anywhere.  He reports requiring blood transfusions in the past.  He is guaiac negative.  Chemistry notable for hyponatremia of 128 as well as mild renal insufficiency.  X-ray of the right BKA stump with findings consistent with osteomyelitis.  CT chest/abdomen/pelvis demonstrates multifocal patchy opacities within the lower lobes.  Patient started on broad-spectrum  antibiotics.  He has been consented for blood transfusion.  He is to be admitted to Hospital Medicine for further management.    Please put in 30 minutes of critical care due to patient having sepsis and anemia requiring blood transfusion  Separate from teaching and exclusive of procedure and ekg time  Includes:  Time at bedside  Time reviewing test results  Time discussing case with staff  Time documenting the medical record  Time spent with family members  Time spent with consults  Management   This chart was completed using dictation software, as a result there may be some transcription errors.                           Clinical Impression:   Final diagnoses:  [A41.9] Sepsis  [R50.9] Fever  [T81.30XA] Wound dehiscence  [S81.801A] Open wound of right lower leg  [T87.89, L89.899] Pressure ulcer of BKA stump        ED Disposition Condition    Admit Stable                Nadja Sierra MD  12/05/22 0984

## 2022-12-06 PROBLEM — R78.81 BACTEREMIA: Status: ACTIVE | Noted: 2022-12-06

## 2022-12-06 LAB
ANION GAP SERPL CALC-SCNC: 7 MMOL/L (ref 8–16)
ANION GAP SERPL CALC-SCNC: 8 MMOL/L (ref 8–16)
BACTERIA #/AREA URNS HPF: NORMAL /HPF
BASOPHILS # BLD AUTO: 0.01 K/UL (ref 0–0.2)
BASOPHILS # BLD AUTO: 0.02 K/UL (ref 0–0.2)
BASOPHILS NFR BLD: 0.1 % (ref 0–1.9)
BASOPHILS NFR BLD: 0.2 % (ref 0–1.9)
BILIRUB UR QL STRIP: NEGATIVE
BLD PROD TYP BPU: NORMAL
BLOOD UNIT EXPIRATION DATE: NORMAL
BLOOD UNIT TYPE CODE: 5100
BLOOD UNIT TYPE: NORMAL
BUN SERPL-MCNC: 22 MG/DL (ref 6–20)
BUN SERPL-MCNC: 24 MG/DL (ref 6–20)
CALCIUM SERPL-MCNC: 8.2 MG/DL (ref 8.7–10.5)
CALCIUM SERPL-MCNC: 8.7 MG/DL (ref 8.7–10.5)
CHLORIDE SERPL-SCNC: 100 MMOL/L (ref 95–110)
CHLORIDE SERPL-SCNC: 99 MMOL/L (ref 95–110)
CLARITY UR: ABNORMAL
CO2 SERPL-SCNC: 21 MMOL/L (ref 23–29)
CO2 SERPL-SCNC: 22 MMOL/L (ref 23–29)
CODING SYSTEM: NORMAL
COLOR UR: YELLOW
CREAT SERPL-MCNC: 2.3 MG/DL (ref 0.5–1.4)
CREAT SERPL-MCNC: 2.5 MG/DL (ref 0.5–1.4)
CTP QC/QA: YES
DIFFERENTIAL METHOD: ABNORMAL
DIFFERENTIAL METHOD: ABNORMAL
DISPENSE STATUS: NORMAL
EOSINOPHIL # BLD AUTO: 0 K/UL (ref 0–0.5)
EOSINOPHIL # BLD AUTO: 0.1 K/UL (ref 0–0.5)
EOSINOPHIL NFR BLD: 0.1 % (ref 0–8)
EOSINOPHIL NFR BLD: 1.2 % (ref 0–8)
ERYTHROCYTE [DISTWIDTH] IN BLOOD BY AUTOMATED COUNT: 14.9 % (ref 11.5–14.5)
ERYTHROCYTE [DISTWIDTH] IN BLOOD BY AUTOMATED COUNT: 15.1 % (ref 11.5–14.5)
EST. GFR  (NO RACE VARIABLE): 35 ML/MIN/1.73 M^2
EST. GFR  (NO RACE VARIABLE): 39 ML/MIN/1.73 M^2
ESTIMATED AVG GLUCOSE: 246 MG/DL (ref 68–131)
GLUCOSE SERPL-MCNC: 108 MG/DL (ref 70–110)
GLUCOSE SERPL-MCNC: 87 MG/DL (ref 70–110)
GLUCOSE UR QL STRIP: NEGATIVE
HBA1C MFR BLD: 10.2 % (ref 4–5.6)
HCT VFR BLD AUTO: 19.2 % (ref 40–54)
HCT VFR BLD AUTO: 25.9 % (ref 40–54)
HGB BLD-MCNC: 5.9 G/DL (ref 14–18)
HGB BLD-MCNC: 8.2 G/DL (ref 14–18)
HGB UR QL STRIP: ABNORMAL
HYALINE CASTS #/AREA URNS LPF: 0 /LPF
IMM GRANULOCYTES # BLD AUTO: 0.04 K/UL (ref 0–0.04)
IMM GRANULOCYTES # BLD AUTO: 0.09 K/UL (ref 0–0.04)
IMM GRANULOCYTES NFR BLD AUTO: 0.4 % (ref 0–0.5)
IMM GRANULOCYTES NFR BLD AUTO: 0.8 % (ref 0–0.5)
KETONES UR QL STRIP: NEGATIVE
LACTATE SERPL-SCNC: 0.7 MMOL/L (ref 0.5–2.2)
LEUKOCYTE ESTERASE UR QL STRIP: NEGATIVE
LYMPHOCYTES # BLD AUTO: 1.4 K/UL (ref 1–4.8)
LYMPHOCYTES # BLD AUTO: 1.6 K/UL (ref 1–4.8)
LYMPHOCYTES NFR BLD: 13.2 % (ref 18–48)
LYMPHOCYTES NFR BLD: 14.9 % (ref 18–48)
MAGNESIUM SERPL-MCNC: 1.6 MG/DL (ref 1.6–2.6)
MCH RBC QN AUTO: 23.2 PG (ref 27–31)
MCH RBC QN AUTO: 24 PG (ref 27–31)
MCHC RBC AUTO-ENTMCNC: 30.7 G/DL (ref 32–36)
MCHC RBC AUTO-ENTMCNC: 31.7 G/DL (ref 32–36)
MCV RBC AUTO: 76 FL (ref 82–98)
MCV RBC AUTO: 76 FL (ref 82–98)
MICROSCOPIC COMMENT: NORMAL
MONOCYTES # BLD AUTO: 1 K/UL (ref 0.3–1)
MONOCYTES # BLD AUTO: 1.4 K/UL (ref 0.3–1)
MONOCYTES NFR BLD: 13.2 % (ref 4–15)
MONOCYTES NFR BLD: 9.4 % (ref 4–15)
NEUTROPHILS # BLD AUTO: 7.7 K/UL (ref 1.8–7.7)
NEUTROPHILS # BLD AUTO: 7.8 K/UL (ref 1.8–7.7)
NEUTROPHILS NFR BLD: 72.6 % (ref 38–73)
NEUTROPHILS NFR BLD: 73.9 % (ref 38–73)
NITRITE UR QL STRIP: NEGATIVE
NRBC BLD-RTO: 0 /100 WBC
NRBC BLD-RTO: 0 /100 WBC
NUM UNITS TRANS PACKED RBC: NORMAL
PH UR STRIP: 6 [PH] (ref 5–8)
PHOSPHATE SERPL-MCNC: 3.9 MG/DL (ref 2.7–4.5)
PLATELET # BLD AUTO: 198 K/UL (ref 150–450)
PLATELET # BLD AUTO: 233 K/UL (ref 150–450)
PMV BLD AUTO: 10.2 FL (ref 9.2–12.9)
PMV BLD AUTO: 10.6 FL (ref 9.2–12.9)
POCT GLUCOSE: 125 MG/DL (ref 70–110)
POCT GLUCOSE: 146 MG/DL (ref 70–110)
POCT GLUCOSE: 205 MG/DL (ref 70–110)
POCT GLUCOSE: 43 MG/DL (ref 70–110)
POCT GLUCOSE: 63 MG/DL (ref 70–110)
POCT GLUCOSE: 96 MG/DL (ref 70–110)
POTASSIUM SERPL-SCNC: 3.8 MMOL/L (ref 3.5–5.1)
POTASSIUM SERPL-SCNC: 4.4 MMOL/L (ref 3.5–5.1)
PROT UR QL STRIP: ABNORMAL
RBC # BLD AUTO: 2.54 M/UL (ref 4.6–6.2)
RBC # BLD AUTO: 3.42 M/UL (ref 4.6–6.2)
RBC #/AREA URNS HPF: 4 /HPF (ref 0–4)
SARS-COV-2 RDRP RESP QL NAA+PROBE: NEGATIVE
SODIUM SERPL-SCNC: 127 MMOL/L (ref 136–145)
SODIUM SERPL-SCNC: 130 MMOL/L (ref 136–145)
SP GR UR STRIP: 1.01 (ref 1–1.03)
URN SPEC COLLECT METH UR: ABNORMAL
UROBILINOGEN UR STRIP-ACNC: NEGATIVE EU/DL
WBC # BLD AUTO: 10.43 K/UL (ref 3.9–12.7)
WBC # BLD AUTO: 10.76 K/UL (ref 3.9–12.7)
WBC #/AREA URNS HPF: 3 /HPF (ref 0–5)

## 2022-12-06 PROCEDURE — 63600175 PHARM REV CODE 636 W HCPCS: Performed by: STUDENT IN AN ORGANIZED HEALTH CARE EDUCATION/TRAINING PROGRAM

## 2022-12-06 PROCEDURE — 84100 ASSAY OF PHOSPHORUS: CPT | Performed by: STUDENT IN AN ORGANIZED HEALTH CARE EDUCATION/TRAINING PROGRAM

## 2022-12-06 PROCEDURE — 80048 BASIC METABOLIC PNL TOTAL CA: CPT | Performed by: STUDENT IN AN ORGANIZED HEALTH CARE EDUCATION/TRAINING PROGRAM

## 2022-12-06 PROCEDURE — P9016 RBC LEUKOCYTES REDUCED: HCPCS | Performed by: STUDENT IN AN ORGANIZED HEALTH CARE EDUCATION/TRAINING PROGRAM

## 2022-12-06 PROCEDURE — 25000003 PHARM REV CODE 250: Performed by: STUDENT IN AN ORGANIZED HEALTH CARE EDUCATION/TRAINING PROGRAM

## 2022-12-06 PROCEDURE — 63600175 PHARM REV CODE 636 W HCPCS: Performed by: INTERNAL MEDICINE

## 2022-12-06 PROCEDURE — 85025 COMPLETE CBC W/AUTO DIFF WBC: CPT | Mod: 91 | Performed by: STUDENT IN AN ORGANIZED HEALTH CARE EDUCATION/TRAINING PROGRAM

## 2022-12-06 PROCEDURE — 83036 HEMOGLOBIN GLYCOSYLATED A1C: CPT | Performed by: STUDENT IN AN ORGANIZED HEALTH CARE EDUCATION/TRAINING PROGRAM

## 2022-12-06 PROCEDURE — 87040 BLOOD CULTURE FOR BACTERIA: CPT | Performed by: INTERNAL MEDICINE

## 2022-12-06 PROCEDURE — 25000003 PHARM REV CODE 250: Performed by: EMERGENCY MEDICINE

## 2022-12-06 PROCEDURE — 83605 ASSAY OF LACTIC ACID: CPT | Performed by: STUDENT IN AN ORGANIZED HEALTH CARE EDUCATION/TRAINING PROGRAM

## 2022-12-06 PROCEDURE — 21400001 HC TELEMETRY ROOM

## 2022-12-06 PROCEDURE — 83735 ASSAY OF MAGNESIUM: CPT | Performed by: STUDENT IN AN ORGANIZED HEALTH CARE EDUCATION/TRAINING PROGRAM

## 2022-12-06 PROCEDURE — 87449 NOS EACH ORGANISM AG IA: CPT | Performed by: STUDENT IN AN ORGANIZED HEALTH CARE EDUCATION/TRAINING PROGRAM

## 2022-12-06 PROCEDURE — 87186 SC STD MICRODIL/AGAR DIL: CPT | Performed by: INTERNAL MEDICINE

## 2022-12-06 PROCEDURE — 63600175 PHARM REV CODE 636 W HCPCS: Performed by: EMERGENCY MEDICINE

## 2022-12-06 PROCEDURE — 80048 BASIC METABOLIC PNL TOTAL CA: CPT | Mod: 91 | Performed by: STUDENT IN AN ORGANIZED HEALTH CARE EDUCATION/TRAINING PROGRAM

## 2022-12-06 PROCEDURE — 27000207 HC ISOLATION

## 2022-12-06 PROCEDURE — 81000 URINALYSIS NONAUTO W/SCOPE: CPT | Performed by: EMERGENCY MEDICINE

## 2022-12-06 RX ORDER — HYDROCODONE BITARTRATE AND ACETAMINOPHEN 500; 5 MG/1; MG/1
TABLET ORAL
Status: DISCONTINUED | OUTPATIENT
Start: 2022-12-06 | End: 2022-12-08

## 2022-12-06 RX ADMIN — VANCOMYCIN HYDROCHLORIDE 1500 MG: 1.5 INJECTION, POWDER, LYOPHILIZED, FOR SOLUTION INTRAVENOUS at 06:12

## 2022-12-06 RX ADMIN — PIPERACILLIN AND TAZOBACTAM 4.5 G: 4; .5 INJECTION, POWDER, LYOPHILIZED, FOR SOLUTION INTRAVENOUS; PARENTERAL at 10:12

## 2022-12-06 RX ADMIN — ESTRADIOL 1 MG: 1 TABLET ORAL at 10:12

## 2022-12-06 RX ADMIN — INSULIN DETEMIR 60 UNITS: 100 INJECTION, SOLUTION SUBCUTANEOUS at 10:12

## 2022-12-06 RX ADMIN — ONDANSETRON 4 MG: 2 INJECTION INTRAMUSCULAR; INTRAVENOUS at 04:12

## 2022-12-06 RX ADMIN — PIPERACILLIN AND TAZOBACTAM 4.5 G: 4; .5 INJECTION, POWDER, LYOPHILIZED, FOR SOLUTION INTRAVENOUS; PARENTERAL at 08:12

## 2022-12-06 RX ADMIN — SPIRONOLACTONE 25 MG: 25 TABLET, FILM COATED ORAL at 08:12

## 2022-12-06 RX ADMIN — HYDROCODONE BITARTRATE AND ACETAMINOPHEN 1 TABLET: 5; 325 TABLET ORAL at 04:12

## 2022-12-06 RX ADMIN — INSULIN DETEMIR 60 UNITS: 100 INJECTION, SOLUTION SUBCUTANEOUS at 12:12

## 2022-12-06 RX ADMIN — INSULIN ASPART 10 UNITS: 100 INJECTION, SOLUTION INTRAVENOUS; SUBCUTANEOUS at 12:12

## 2022-12-06 RX ADMIN — PIPERACILLIN AND TAZOBACTAM 4.5 G: 4; .5 INJECTION, POWDER, LYOPHILIZED, FOR SOLUTION INTRAVENOUS; PARENTERAL at 12:12

## 2022-12-06 RX ADMIN — Medication 24 G: at 09:12

## 2022-12-06 NOTE — ASSESSMENT & PLAN NOTE
Presented with fevers, SOB, and cough   CT CAP showed multifocal patchy opacities seen within the lower lobes, left greater than right (aspiration versus infectious process)    Plan:   - Vanc/Zosyn for now   - ID consult   - Respiratory cultures  - Legionella urine antigen   - Monitor respiratory symptoms

## 2022-12-06 NOTE — NURSING TRANSFER
Nursing Transfer Note      12/6/2022     Reason patient is being transferred: Sepsis    Transfer From: ICU    Transfer via wheelchair    Transfer with cardiac monitoring    Medicines sent: yes    Any special needs or follow-up needed: none    Chart send with patient: Yes    Patient reassessed at: 12/6/22 @ 1720     Upon arrival to floor: cardiac monitor applied, patient oriented to room, call bell in reach, and bed in lowest position

## 2022-12-06 NOTE — ASSESSMENT & PLAN NOTE
Unclear etiology   No signs/symptoms of bleeding, patient reports having prior similar episodes where transfusions were needed without any signs of bleeding   Transfuse to keep Hb > 7   Outpatient follow up with GI

## 2022-12-06 NOTE — HOSPITAL COURSE
Ms Torres is a 27y transgender F w/ T1DM and R BKA who presented with fevers and was found to have BKA stump osteomyelitis and Group G Strep Bacteremia. She underwent stump revision with orthopedics, unfortunately no operative samples were sent for culture or pathology. TTE did not show vegetation; pt refused KEL. ID recommended 4 weeks CTX with further therapy to decided at outpatient visit. There was some concern for a dental source of bacteremia given the patient's poor dentition; ID recommended outpatient dental evaluation.    Of note, pt was noted to be hyperkalemic during the admission. She reported frequently eating bananas, even from her personal supply during her hospital admission. Pt counseled of the importance of moderating potassium intake while on spironolactone and the importance of avoiding excess sugar intake for a diabetic patient.     Ms. Torres was discharged on IV CTX to follow up with orthopedics, ID, dentistry, and her PCP for management of her numerous complex medical issues. Home PT/OT was ordered based on recommendations from inpatient PT/OT. All questions answered prior to discharge, pt in agreement with plan.

## 2022-12-06 NOTE — ED NOTES
"Pt is upset about being NPO and is now refusing temperature. Explained that this is the process for a blood transfusion to monitor for reaction to the blood. States "I ain't having no reaction." Will not keep SpO2 monitor on finger. Also no blankets available as requested but another sheet was placed on pt.  "

## 2022-12-06 NOTE — ASSESSMENT & PLAN NOTE
Patient with acute kidney injury likely due to pre-renal azotemia THEE is currently stable. Labs reviewed- Renal function/electrolytes with Estimated Creatinine Clearance (based on SCr of 2.3 mg/dL (H))  Female: 48.2 mL/min (A)  Male: 58.5 mL/min (A)  Hover for info according to latest data. Monitor urine output and serial BMP and adjust therapy as needed. Avoid nephrotoxins and renally dose meds for GFR listed above.     IVF   Monitor BMPs

## 2022-12-06 NOTE — ASSESSMENT & PLAN NOTE
Presented with fevers   Xray right tib/fib showed osteomyelitis at the distal aspect of the tibia and fibula amputation site    Plan:   - Vancomycin/zosyn as patient presented septic w/ soft BP  - Podiatry/ID consult   - F/U cultures  - NPO at MN

## 2022-12-06 NOTE — H&P
"[facility]  [facility] & Physical    Patient Name: Pratik Torres  MRN: [id number]  Patient Class: IP- Inpatient  Admission Date: 12/5/2022  Attending Physician: Delfin Sanchez MD   Primary Care Provider: Felipe Summers MD         Patient information was obtained from patient and ER records.     Subjective:     Principal Problem:Sepsis    Chief Complaint:   Chief Complaint   Patient presents with    Fever     EMS called to 28yo male that has a fever of 103.1 at triage and has been having ongoing issues with a wound on his right below knee amputation. Hr and RR elevated/         HPI: This is a 27 year old transgender female with a PMHx of T1DM c/b RLE cellulitis s/p right BKA, iron deficiency anemia, IBS-D who presents with fevers.     Patient reports developing fevers, chills, multiple episodes of non bloody emesis and decreased po intake 2 days prior to presentation. Associated symptoms include cough, and SOB that started a day prior. The patient has a chronic right stump wound and reports that he has "a bone infection" that she's following with wound care for. In the ED, he was febrile (39.5), tachycardic (140), tachypnic. Labs showed no leukocytosis (8.7), microcytic anemia (5.9), hyponatremia (128), elevated creatinine (2.1 baseline of 1.2), negative lactic acid (1.0), elevated procalcitonin (25.0), negative troponin (0.017). CT CAP showed multifocal patchy opacities seen within the lower lobes, left greater than right (aspiration versus infectious process). Xray right tib/fib showed osteomyelitis at the distal aspect of the tibia and fibula amputation site. The patient was given fluids, vancomycin, zosyn and was admitted for further management.     Preferred pronouns: She/her  Preferred name: Pratik   Past Medical History:   Diagnosis Date    Diabetes mellitus     IBS (irritable bowel syndrome)        Past Surgical History:   Procedure Laterality Date    COLONOSCOPY   "    INCISION OF PERIRECTAL ABSCESS N/A 6/10/2020    Procedure: INCISION, ABSCESS, PERIRECTAL;  Surgeon: Ronald Santo MD;  Location: [facility] OR 2ND FLR;  Service: Colon and Rectal;  Laterality: N/A;  PATIENT IS COVID POSITIVE    TOE SURGERY      WOUND DEBRIDEMENT Right 6/10/2020    Procedure: DEBRIDEMENT, WOUND ISCHIORECTAL;  Surgeon: Ronald Santo MD;  Location: [facility] OR 2ND FLR;  Service: Colon and Rectal;  Laterality: Right;       Review of patient's allergies indicates:   Allergen Reactions    Shrimp        No current facility-administered medications on file prior to encounter.     Current Outpatient Medications on File Prior to Encounter   Medication Sig    DESCOVY 200-25 mg Tab Take 1 tablet by mouth once daily.    insulin aspart U-100 (NOVOLOG) 100 unit/mL injection Inject 10 Units into the skin 3 (three) times daily before meals.    insulin glargine 100 units/mL (3mL) SubQ pen Inject 50 Units into the skin every evening. (Patient taking differently: Inject 60 Units into the skin every evening.)    spironolactone (ALDACTONE) 25 MG tablet Take 25 mg by mouth once daily.    estradioL (ESTRACE) 1 MG tablet Take 1 mg by mouth once daily.    [DISCONTINUED] albuterol (PROVENTIL/VENTOLIN HFA) 90 mcg/actuation inhaler Inhale 1-2 puffs into the lungs every 6 (six) hours as needed for Wheezing. Rescue     Family History    None       Tobacco Use    Smoking status: Never    Smokeless tobacco: Never   Substance and Sexual Activity    Alcohol use: Yes     Comment: occasionally    Drug use: Not Currently    Sexual activity: Not on file     Review of Systems   Constitutional:  Positive for fever.   HENT: Negative.     Eyes: Negative.    Respiratory:  Positive for cough and shortness of breath.    Cardiovascular: Negative.    Gastrointestinal:  Positive for diarrhea, nausea and vomiting.        Chronic diarrhea related to IBS   Endocrine: Negative.    Genitourinary: Negative.    Musculoskeletal: Negative.     Skin: Negative.    Allergic/Immunologic: Negative.    Neurological: Negative.    Psychiatric/Behavioral: Negative.     Objective:     Vital Signs (Most Recent):  Temp: 98.1 °F (36.7 °C) (12/05/22 2203)  Pulse: 79 (12/05/22 2217)  Resp: 17 (12/05/22 2203)  BP: 103/63 (12/05/22 2217)  SpO2: 100 % (12/05/22 2217) Vital Signs (24h Range):  Temp:  [98.1 °F (36.7 °C)-103.1 °F (39.5 °C)] 98.1 °F (36.7 °C)  Pulse:  [] 79  Resp:  [10-26] 17  SpO2:  [97 %-100 %] 100 %  BP: ()/(48-64) 103/63     Weight: 98 kg (216 lb)  Body mass index is 29.29 kg/m².    Physical Exam  Vitals and nursing note reviewed.   Constitutional:       General: She is not in acute distress.     Appearance: Normal appearance. She is not ill-appearing.   HENT:      Head: Normocephalic and atraumatic.      Nose: Nose normal.      Mouth/Throat:      Mouth: Mucous membranes are moist.   Eyes:      Extraocular Movements: Extraocular movements intact.   Cardiovascular:      Rate and Rhythm: Normal rate.      Pulses: Normal pulses.      Heart sounds: No murmur heard.  Pulmonary:      Effort: Pulmonary effort is normal. No respiratory distress.   Abdominal:      General: Abdomen is flat.      Palpations: Abdomen is soft.      Tenderness: There is no abdominal tenderness.   Musculoskeletal:      Right lower leg: No edema.      Left lower leg: No edema.   Skin:     General: Skin is warm.      Capillary Refill: Capillary refill takes less than 2 seconds.   Neurological:      General: No focal deficit present.      Mental Status: She is alert.   Psychiatric:         Mood and Affect: Mood normal.           Significant Labs: All pertinent labs within the past 24 hours have been reviewed.    Significant Imaging: I have reviewed all pertinent imaging results/findings within the past 24 hours.    Assessment/Plan:     * Sepsis  This patient does have evidence of infective focus  My overall impression is sepsis. Vital signs were reviewed and noted in progress  note.  Antibiotics given-   Antibiotics (From admission, onward)    Start     Stop Route Frequency Ordered    12/06/22 1800  vancomycin 1.5 g in dextrose 5 % 250 mL IVPB (ready to mix)         -- IV Every 24 hours (non-standard times) 12/05/22 1801    12/06/22 0100  piperacillin-tazobactam 4.5 g in dextrose 5 % 100 mL IVPB (ready to mix system)         -- IV Every 8 hours (non-standard times) 12/05/22 2217    12/05/22 1741  vancomycin - pharmacy to dose  (vancomycin IVPB)        [facility] for [facility].    -- IV pharmacy to manage frequency 12/05/22 1642        Cultures were taken-   Microbiology Results (last 7 days)     Procedure Component Value Units Date/Time    Blood culture x two cultures. Draw prior to antibiotics. [[id number]] Collected: 12/05/22 1654    Order Status: Sent Specimen: Blood from Peripheral, Forearm, Left Updated: 12/05/22 1708    Blood culture x two cultures. Draw prior to antibiotics. [[id number]] Collected: 12/05/22 1646    Order Status: Sent Specimen: Blood from Peripheral, Antecubital, Left Updated: 12/05/22 1708        Latest lactate reviewed, they are-  Recent Labs   Lab 12/05/22 1646   LACTATE 1.0       Organ dysfunction indicated by Acute kidney injury  Source- Skin/soft tissue versus respiratory     Source control Achieved by-    Continue vancomycin/zosyn for now   F/U blood cultures and de-escalate appropriately   Infectious diease consult   Podiatry consult       Transgender woman on hormone therapy  Resume estrace     Osteomyelitis of tibia  Presented with fevers   Xray right tib/fib showed osteomyelitis at the distal aspect of the tibia and fibula amputation site    Plan:   - Vancomycin/zosyn as patient presented septic w/ soft BP  - Podiatry/ID consult   - F/U cultures  - NPO at [facility]     Pneumonia  Presented with fevers, SOB, and cough   CT CAP showed multifocal patchy opacities seen within the lower lobes, left greater than right (aspiration versus infectious  process)    Plan:   - Vanc/Zosyn for now   - ID consult   - Respiratory cultures  - Legionella urine antigen   - Monitor respiratory symptoms     THEE (acute kidney injury)  Patient with acute kidney injury likely due to pre-renal azotemia THEE is currently stable. Labs reviewed- Renal function/electrolytes with Estimated Creatinine Clearance (based on SCr of 2.1 mg/dL (H))  Female: 52.8 mL/min (A)  Male: 64.1 mL/min (A)  Hover for info according to latest data. Monitor urine output and serial BMP and adjust therapy as needed. Avoid nephrotoxins and renally dose meds for GFR listed above.     IVF   Monitor BMPs    Microcytic anemia  Unclear etiology   No signs/symptoms of bleeding, patient reports having prior similar episodes where transfusions were needed without any signs of bleeding   Transfuse to keep Hb > 7   Outpatient follow up with GI     Diarrhea  Likely related to IBS-D  R/O C.diff     Type 1 diabetes mellitus with hyperglycemia  Patient's FSGs are controlled on current medication regimen.  Last A1c reviewed-   Lab Results   Component Value Date    HGBA1C 12.2 (H) 06/05/2020     Most recent fingerstick glucose reviewed-   Recent Labs   Lab 12/05/22  1806   POCTGLUCOSE 78     Current correctional scale  Low  Maintain anti-hyperglycemic dose as follows-   Antihyperglycemics (From admission, onward)    Start     Stop Route Frequency Ordered    12/06/22 0715  insulin aspart U-100 pen 10 Units         -- SubQ 3 times daily with meals 12/05/22 2221 12/05/22 2330  insulin detemir U-100 pen 60 Units         -- SubQ 2 times daily 12/05/22 2215        Hold Oral hypoglycemics while patient is in the hospital.      VTE Risk Mitigation (From admission, onward)         Ordered     heparin (porcine) injection 5,000 Units  Every 8 hours         12/05/22 2215     IP VTE HIGH RISK PATIENT  Once         12/05/22 2215     Place sequential compression device  Until discontinued         12/05/22 2215                   Sawyer  MD Denny  Department of Hospital Medicine   [facility] - Emergency Dept

## 2022-12-06 NOTE — NURSING
Report called to ALEXANDREA Lynn using SBAR. All questions answered. Patient transported on tele monitor with RN present. No apparent distress noted.

## 2022-12-06 NOTE — ASSESSMENT & PLAN NOTE
Patient with acute kidney injury likely due to pre-renal azotemia THEE is currently stable. Labs reviewed- Renal function/electrolytes with Estimated Creatinine Clearance (based on SCr of 2.1 mg/dL (H))  Female: 52.8 mL/min (A)  Male: 64.1 mL/min (A)  Hover for info according to latest data. Monitor urine output and serial BMP and adjust therapy as needed. Avoid nephrotoxins and renally dose meds for GFR listed above.     IVF   Monitor BMPs

## 2022-12-06 NOTE — PLAN OF CARE
[facility] Emergency Dept  Initial Discharge Assessment       Primary Care Provider: Felipe Summers MD    Admission Diagnosis: Sepsis [A41.9]    Admission Date: 12/5/2022  Expected Discharge Date:     SW completed initial assessment and discussed discharge planning with patient at his bedside. Patient stated that he lives alone, but once in a while his mom visit him. Patient stated that he does have any transportation to get home when discharge from the hospital. SW to setup transportation for patient upon discharge.    Discharge Barriers Identified: None    Payor: /     Extended Emergency Contact Information  Primary Emergency Contact: carson ruiz  Mobile Phone: [phone]  Relation: Mother  Preferred language: English   needed? No    Discharge Plan A: Home  Discharge Plan B: [id number] - [place], [place] - [address] AT [facility] [address]  [address]  [facility] [address]  Phone: [phone] Fax: [phone]      Initial Assessment (most recent)       Adult Discharge Assessment - 12/05/22 2200          Discharge Assessment    Assessment Type Discharge Planning Assessment     Confirmed/corrected address, phone number and insurance Yes     Confirmed Demographics Contacted registration to update     Source of Information patient     When was your last doctors appointment? --   Patient stated that he does not remember    Reason For Admission Sepsis     People in Home alone     Do you expect to return to your current living situation? Yes     Do you have help at home or someone to help you manage your care at home? No     Prior to hospitilization cognitive status: Alert/Oriented     Current cognitive status: Alert/Oriented     Equipment Currently Used at Home none     Readmission within 30 days? No     Patient currently being followed by outpatient case management? No     Do you currently have service(s) that help you manage  your care at home? No     Do you take prescription medications? Yes     Do you have prescription coverage? No     Do you have any problems affording any of your prescribed medications? No     Is the patient taking medications as prescribed? yes     Who is going to help you get home at discharge? sara mesfinmarino (Mother)   [phone] (Mobile)     How do you get to doctors appointments? family or friend will provide     Are you on dialysis? No     Do you take coumadin? No     Discharge Plan A Home     Discharge Plan B Home Health     DME Needed Upon Discharge  none     Discharge Plan discussed with: Patient     Discharge Barriers Identified None

## 2022-12-06 NOTE — HPI
"This is a 27 year old transgender female with a PMHx of T1DM c/b RLE cellulitis s/p right BKA, iron deficiency anemia, IBS-D who presents with fevers.     Patient reports developing fevers, chills, multiple episodes of non bloody emesis and decreased po intake 2 days prior to presentation. Associated symptoms include cough, and SOB that started a day prior. The patient has a chronic right stump wound and reports that he has "a bone infection" that she's following with wound care for. In the ED, he was febrile (39.5), tachycardic (140), tachypnic. Labs showed no leukocytosis (8.7), microcytic anemia (5.9), hyponatremia (128), elevated creatinine (2.1 baseline of 1.2), negative lactic acid (1.0), elevated procalcitonin (25.0), negative troponin (0.017). CT CAP showed multifocal patchy opacities seen within the lower lobes, left greater than right (aspiration versus infectious process). Xray right tib/fib showed osteomyelitis at the distal aspect of the tibia and fibula amputation site. The patient was given fluids, vancomycin, zosyn and was admitted for further management.   "

## 2022-12-06 NOTE — H&P
"[facility]  History & Physical    Patient Name: Pratik Torres  MRN: [id number]  Patient Class: IP- Inpatient  Admission Date: 12/5/2022  Attending Physician: Delfin Sanchez MD   Primary Care Provider: Felipe Summers MD         Patient information was obtained from patient and ER records.     Subjective:     Principal Problem:Sepsis    Chief Complaint:   Chief Complaint   Patient presents with    Fever     EMS called to 26yo male that has a fever of 103.1 at triage and has been having ongoing issues with a wound on his right below knee amputation. Hr and RR elevated/         HPI: This is a 27 year old transgender female with a PMHx of T1DM c/b LLE cellulitis s/p left BKA, iron deficiency anemia, IBS-D who presents with fevers.     Patient reports developing fevers, chills, multiple episodes of non bloody emesis and decreased po intake 2 days prior to presentation. Associated symptoms include cough, and SOB that started a day prior. The patient has a chronic right stump wound and reports that he has "a bone infection" that she's following with wound care for. In the ED, he was febrile (39.5), tachycardic (140), tachypnic. Labs showed no leukocytosis (8.7), microcytic anemia (5.9), hyponatremia (128), elevated creatinine (2.1 baseline of 1.2), negative lactic acid (1.0), elevated procalcitonin (25.0), negative troponin (0.017). CT CAP showed multifocal patchy opacities seen within the lower lobes, left greater than right (aspiration versus infectious process). Xray right tib/fib showed osteomyelitis at the distal aspect of the tibia and fibula amputation site. The patient was given fluids, vancomycin, zosyn and was admitted for further management.     Preferred pronouns: She/her  Preferred name: Pratik     Past Medical History:   Diagnosis Date    Diabetes mellitus     IBS (irritable bowel syndrome)        Past Surgical History:   Procedure Laterality Date    COLONOSCOPY   "    INCISION OF PERIRECTAL ABSCESS N/A 6/10/2020    Procedure: INCISION, ABSCESS, PERIRECTAL;  Surgeon: Ronald Santo MD;  Location: [facility] OR 2ND FLR;  Service: Colon and Rectal;  Laterality: N/A;  PATIENT IS COVID POSITIVE    TOE SURGERY      WOUND DEBRIDEMENT Right 6/10/2020    Procedure: DEBRIDEMENT, WOUND ISCHIORECTAL;  Surgeon: Ronald Santo MD;  Location: [facility] OR 2ND FLR;  Service: Colon and Rectal;  Laterality: Right;       Review of patient's allergies indicates:   Allergen Reactions    Shrimp        No current facility-administered medications on file prior to encounter.     Current Outpatient Medications on File Prior to Encounter   Medication Sig    DESCOVY 200-25 mg Tab Take 1 tablet by mouth once daily.    insulin aspart U-100 (NOVOLOG) 100 unit/mL injection Inject 10 Units into the skin 3 (three) times daily before meals.    insulin glargine 100 units/mL (3mL) SubQ pen Inject 50 Units into the skin every evening. (Patient taking differently: Inject 60 Units into the skin every evening.)    spironolactone (ALDACTONE) 25 MG tablet Take 25 mg by mouth once daily.    estradioL (ESTRACE) 1 MG tablet Take 1 mg by mouth once daily.    [DISCONTINUED] albuterol (PROVENTIL/VENTOLIN HFA) 90 mcg/actuation inhaler Inhale 1-2 puffs into the lungs every 6 (six) hours as needed for Wheezing. Rescue     Family History    None       Tobacco Use    Smoking status: Never    Smokeless tobacco: Never   Substance and Sexual Activity    Alcohol use: Yes     Comment: occasionally    Drug use: Not Currently    Sexual activity: Not on file     Review of Systems   Constitutional:  Positive for fever.   HENT: Negative.     Eyes: Negative.    Respiratory:  Positive for cough and shortness of breath.    Cardiovascular: Negative.    Gastrointestinal:  Positive for diarrhea, nausea and vomiting.        Chronic diarrhea related to IBS   Endocrine: Negative.    Genitourinary: Negative.    Musculoskeletal: Negative.     Skin: Negative.    Allergic/Immunologic: Negative.    Neurological: Negative.    Psychiatric/Behavioral: Negative.     Objective:     Vital Signs (Most Recent):  Temp: 98.1 °F (36.7 °C) (12/05/22 2203)  Pulse: 79 (12/05/22 2217)  Resp: 17 (12/05/22 2203)  BP: 103/63 (12/05/22 2217)  SpO2: 100 % (12/05/22 2217) Vital Signs (24h Range):  Temp:  [98.1 °F (36.7 °C)-103.1 °F (39.5 °C)] 98.1 °F (36.7 °C)  Pulse:  [] 79  Resp:  [10-26] 17  SpO2:  [97 %-100 %] 100 %  BP: ()/(48-64) 103/63     Weight: 98 kg (216 lb)  Body mass index is 29.29 kg/m².    Physical Exam  Vitals and nursing note reviewed.   Constitutional:       General: She is not in acute distress.     Appearance: Normal appearance. She is not ill-appearing.   HENT:      Head: Normocephalic and atraumatic.      Nose: Nose normal.      Mouth/Throat:      Mouth: Mucous membranes are moist.   Eyes:      Extraocular Movements: Extraocular movements intact.   Cardiovascular:      Rate and Rhythm: Normal rate.      Pulses: Normal pulses.      Heart sounds: No murmur heard.  Pulmonary:      Effort: Pulmonary effort is normal. No respiratory distress.   Abdominal:      General: Abdomen is flat.      Palpations: Abdomen is soft.      Tenderness: There is no abdominal tenderness.   Musculoskeletal:      Right lower leg: No edema.      Left lower leg: No edema.   Skin:     General: Skin is warm.      Capillary Refill: Capillary refill takes less than 2 seconds.   Neurological:      General: No focal deficit present.      Mental Status: She is alert.   Psychiatric:         Mood and Affect: Mood normal.           Significant Labs: All pertinent labs within the past 24 hours have been reviewed.    Significant Imaging: I have reviewed all pertinent imaging results/findings within the past 24 hours.    Assessment/Plan:     * Sepsis  This patient does have evidence of infective focus  My overall impression is sepsis. Vital signs were reviewed and noted in progress  note.  Antibiotics given-   Antibiotics (From admission, onward)    Start     Stop Route Frequency Ordered    12/06/22 1800  vancomycin 1.5 g in dextrose 5 % 250 mL IVPB (ready to mix)         -- IV Every 24 hours (non-standard times) 12/05/22 1801    12/06/22 0100  piperacillin-tazobactam 4.5 g in dextrose 5 % 100 mL IVPB (ready to mix system)         -- IV Every 8 hours (non-standard times) 12/05/22 2217    12/05/22 1741  vancomycin - pharmacy to dose  (vancomycin IVPB)        [facility].    -- IV pharmacy to manage frequency 12/05/22 1642        Cultures were taken-   Microbiology Results (last 7 days)     Procedure Component Value Units Date/Time    Blood culture x two cultures. Draw prior to antibiotics. [[id number]] Collected: 12/05/22 1654    Order Status: Sent Specimen: Blood from Peripheral, Forearm, Left Updated: 12/05/22 1708    Blood culture x two cultures. Draw prior to antibiotics. [[id number]] Collected: 12/05/22 1646    Order Status: Sent Specimen: Blood from Peripheral, Antecubital, Left Updated: 12/05/22 1708        Latest lactate reviewed, they are-  Recent Labs   Lab 12/05/22 1646   LACTATE 1.0       Organ dysfunction indicated by Acute kidney injury  Source- Skin/soft tissue versus respiratory     Source control Achieved by-    Continue vancomycin/zosyn for now   F/U blood cultures and de-escalate appropriately   Infectious diease consult   Podiatry consult       Transgender woman on hormone therapy  Resume estrace     Osteomyelitis of tibia  Presented with fevers   Xray right tib/fib showed osteomyelitis at the distal aspect of the tibia and fibula amputation site    Plan:   - Vancomycin/zosyn as patient presented septic w/ soft BP  - Podiatry/ID consult   - F/U cultures  - NPO at [facility]     Pneumonia  Presented with fevers, SOB, and cough   CT CAP showed multifocal patchy opacities seen within the lower lobes, left greater than right (aspiration versus infectious  process)    Plan:   - Vanc/Zosyn for now   - ID consult   - Respiratory cultures  - Legionella urine antigen   - Monitor respiratory symptoms     THEE (acute kidney injury)  Patient with acute kidney injury likely due to pre-renal azotemia THEE is currently stable. Labs reviewed- Renal function/electrolytes with Estimated Creatinine Clearance (based on SCr of 2.1 mg/dL (H))  Female: 52.8 mL/min (A)  Male: 64.1 mL/min (A)  Hover for info according to latest data. Monitor urine output and serial BMP and adjust therapy as needed. Avoid nephrotoxins and renally dose meds for GFR listed above.     IVF   Monitor BMPs    Microcytic anemia  Unclear etiology   No signs/symptoms of bleeding, patient reports having prior similar episodes where transfusions were needed without any signs of bleeding   Transfuse to keep Hb > 7   Outpatient follow up with GI     Diarrhea  Likely related to IBS-D  R/O C.diff     Type 1 diabetes mellitus with hyperglycemia  Patient's FSGs are controlled on current medication regimen.  Last A1c reviewed-   Lab Results   Component Value Date    HGBA1C 12.2 (H) 06/05/2020     Most recent fingerstick glucose reviewed-   Recent Labs   Lab 12/05/22  1806   POCTGLUCOSE 78     Current correctional scale  Low  Maintain anti-hyperglycemic dose as follows-   Antihyperglycemics (From admission, onward)    Start     Stop Route Frequency Ordered    12/06/22 0715  insulin aspart U-100 pen 10 Units         -- SubQ 3 times daily with meals 12/05/22 2221 12/05/22 2330  insulin detemir U-100 pen 60 Units         -- SubQ 2 times daily 12/05/22 2215        Hold Oral hypoglycemics while patient is in the hospital.      VTE Risk Mitigation (From admission, onward)         Ordered     heparin (porcine) injection 5,000 Units  Every 8 hours         12/05/22 2215     IP VTE HIGH RISK PATIENT  Once         12/05/22 2215     Place sequential compression device  Until discontinued         12/05/22 2215                   Sawyer  MD Denny  Department of Hospital Medicine   [facility] [place] - Emergency Dept

## 2022-12-06 NOTE — PROGRESS NOTES
Pharmacokinetic Initial Assessment: IV Vancomycin    Assessment/Plan:    Initiate intravenous vancomycin with loading dose of 2000 mg once followed by a maintenance dose of vancomycin 1500mg IV every 24 hours  Desired empiric serum trough concentration is 10 to 20 mcg/mL  Draw vancomycin trough level 60 min prior to third dose on 12/7 at approximately 17:00  Pharmacy will continue to follow and monitor vancomycin.      Please contact pharmacy at extension [phone] with any questions regarding this assessment.     Thank you for the consult,   Veda Hodgson       Patient brief summary:  Pratik Torres is a 27 y.o. adult initiated on antimicrobial therapy with IV Vancomycin for treatment of suspected skin & soft tissue infection    Drug Allergies:   Review of patient's allergies indicates:   Allergen Reactions    Shrimp        Actual Body Weight:   98 kg    Renal Function:   Estimated Creatinine Clearance (based on SCr of 2.1 mg/dL (H))  Female: 52.8 mL/min (A)  Male: 64.1 mL/min (A)  Hover for info,     Dialysis Method (if applicable):  N/A    CBC (last 72 hours):  Recent Labs   Lab Result Units 12/05/22  1646   WBC K/uL 8.75   Hemoglobin g/dL 5.9*   Hematocrit % 19.5*   Platelets K/uL 209   Gran % % 85.3*   Lymph % % 8.7*   Mono % % 5.0   Eosinophil % % 0.0   Basophil % % 0.1   Differential Method  Automated       Metabolic Panel (last 72 hours):  Recent Labs   Lab Result Units 12/05/22  1646   Sodium mmol/L 128*   Potassium mmol/L 4.3   Chloride mmol/L 99   CO2 mmol/L 22*   Glucose mg/dL 62*   BUN mg/dL 24*   Creatinine mg/dL 2.1*   Albumin g/dL 1.6*   Total Bilirubin mg/dL 0.8   Alkaline Phosphatase U/L 116   AST U/L 26   ALT U/L 9*       Drug levels (last 3 results):  No results for input(s): VANCOMYCINRA, VANCORANDOM, VANCOMYCINPE, VANCOPEAK, VANCOMYCINTR, VANCOTROUGH in the last 72 hours.    Microbiologic Results:  Microbiology Results (last 7 days)       Procedure Component Value Units Date/Time    Blood culture x  two cultures. Draw prior to antibiotics. [[id number]] Collected: 12/05/22 1654    Order Status: Sent Specimen: Blood from Peripheral, Forearm, Left Updated: 12/05/22 1708    Blood culture x two cultures. Draw prior to antibiotics. [[id number]] Collected: 12/05/22 1646    Order Status: Sent Specimen: Blood from Peripheral, Antecubital, Left Updated: 12/05/22 1708

## 2022-12-06 NOTE — ASSESSMENT & PLAN NOTE
This patient does have evidence of infective focus  My overall impression is sepsis. Vital signs were reviewed and noted in progress note.  Antibiotics given-   Antibiotics (From admission, onward)    Start     Stop Route Frequency Ordered    12/06/22 1800  vancomycin 1.5 g in dextrose 5 % 250 mL IVPB (ready to mix)         -- IV Every 24 hours (non-standard times) 12/05/22 1801    12/06/22 0100  piperacillin-tazobactam 4.5 g in dextrose 5 % 100 mL IVPB (ready to mix system)         -- IV Every 8 hours (non-standard times) 12/05/22 2217    12/05/22 1741  vancomycin - pharmacy to dose  (vancomycin IVPB)        See [facility] for full Linked Orders Report.    -- IV pharmacy to manage frequency 12/05/22 1642        Cultures were taken-   Microbiology Results (last 7 days)     Procedure Component Value Units Date/Time    Blood culture x two cultures. Draw prior to antibiotics. [[id number]] Collected: 12/05/22 1654    Order Status: Sent Specimen: Blood from Peripheral, Forearm, Left Updated: 12/05/22 1708    Blood culture x two cultures. Draw prior to antibiotics. [[id number]] Collected: 12/05/22 1646    Order Status: Sent Specimen: Blood from Peripheral, Antecubital, Left Updated: 12/05/22 1708        Latest lactate reviewed, they are-  Recent Labs   Lab 12/05/22 1646   LACTATE 1.0       Organ dysfunction indicated by Acute kidney injury  Source- Skin/soft tissue versus respiratory     Source control Achieved by-    Continue vancomycin/zosyn for now   F/U blood cultures and de-escalate appropriately   Infectious diease consult   Podiatry consult

## 2022-12-06 NOTE — ED NOTES
Pt has asked constantly throughout the night for food. He is agitated about being NPO. Informed that he is due to see specialists today about wound.

## 2022-12-06 NOTE — HPI
"27 transgender female with h/o T1DM, R BKA, IBS admitted 12/5 with fatigue/fall. Says fever didn't start till hospital. Reports injury to L shoulder during fal. reports chronic nausea/intermittent loose stools c/w IBS. Reports chronic issues with stump and says there isn't enough skin for wound to heal. Seeing outpatiet wound care. Reports having strep bacteremia in the past (admit at [facility] ~2020) and says he was treated for about 6 weeks with iv abx. Reports that his teeth have been falling out. Says he's been having trouble getting into dental clinic.      CT  1. Multifocal patchy opacities seen within the lower lobes, left greater than right.  Findings may be seen with aspiration or infectious process/developing pneumonia in the right clinical setting.  No confluent consolidation seen.  2. Scattered liquid stool seen within the colon which may be seen with diarrheal illness.  3. Hepatosplenomegaly.    Bcx 12/5 with both sets positive for group G strep.   Blood Culture, Routine Gram stain leonard bottle: Gram positive cocci in chains resembling Strep P    Blood Culture, Routine Results called to and read back by:Sindi ALATORRE at 0243 aw P    Blood Culture, Routine Gram stain aer bottle: Gram positive cocci in chains resembling Strep P    Blood Culture, Routine Positive results previously called P    Blood Culture, Routine  Abnormal   STREPTOCOCCUS GROUP G   Susceptibility pending   Beta-hemolytic streptococci are routinely susceptible to   penicillins,cephalosporins and carbapenems.        Repeat 12/6 NGTD    2d echo without obvious vegetations.      Febrile 103 on arrival    Day #2 vanc/zosyn --> ceftriaxone     Xray right tib/fib showed osteomyelitis at the distal aspect of the tibia and fibula amputation site. The patient was given fluids, vancomycin, zosyn and was admitted for further management.     ID consulted for "osteomyelitis"  "

## 2022-12-06 NOTE — ASSESSMENT & PLAN NOTE
This patient does have evidence of infective focus  My overall impression is sepsis. Vital signs were reviewed and noted in progress note.  Antibiotics given-   Antibiotics (From admission, onward)    Start     Stop Route Frequency Ordered    12/06/22 1800  vancomycin 1.5 g in dextrose 5 % 250 mL IVPB (ready to mix)         -- IV Every 24 hours (non-standard times) 12/05/22 1801    12/06/22 0100  piperacillin-tazobactam 4.5 g in dextrose 5 % 100 mL IVPB (ready to mix system)         -- IV Every 8 hours (non-standard times) 12/05/22 2217    12/05/22 1741  vancomycin - pharmacy to dose  (vancomycin IVPB)        [facility].    -- IV pharmacy to manage frequency 12/05/22 1642        Cultures were taken-   Microbiology Results (last 7 days)     Procedure Component Value Units Date/Time    Blood culture [[id number]]     Order Status: Sent Specimen: Blood     Blood culture [[id number]]     Order Status: Sent Specimen: Blood     Blood culture x two cultures. Draw prior to antibiotics. [[id number]] Collected: 12/05/22 1646    Order Status: Completed Specimen: Blood from Peripheral, Antecubital, Left Updated: 12/06/22 0655     Blood Culture, Routine Gram stain aer bottle: Gram positive cocci in chains resembling Strep      Positive results previously called    Narrative:      Aerobic and anaerobic    Blood culture x two cultures. Draw prior to antibiotics. [[id number]] Collected: 12/05/22 1654    Order Status: Completed Specimen: Blood from Peripheral, Forearm, Left Updated: 12/06/22 0343     Blood Culture, Routine Gram stain leonard bottle: Gram positive cocci in chains resembling Strep      Results called to and read back by:Sindi ALATORRE at 0243 aw      Gram stain aer bottle: Gram positive cocci in chains resembling Strep      Positive results previously called    Narrative:      Aerobic and anaerobic    Clostridium difficile EIA [[id number]]     Order Status: No result Specimen: Stool     Culture,  Respiratory with Gram Stain [[id number]]     Order Status: No result Specimen: Respiratory from Sputum         Latest lactate reviewed, they are-  Recent Labs   Lab 12/05/22  1646 12/06/22  0020   LACTATE 1.0 0.7       Organ dysfunction indicated by Acute kidney injury  Source- Skin/soft tissue versus respiratory     Source control Achieved by-    Continue vancomycin/zosyn for now   F/U blood cultures and de-escalate appropriately   Infectious diease consult   Podiatry consult     Sepsis from osteo of R lower leg/Possible pneumonia and possible bacteremia. Continue Abx. ID consult

## 2022-12-06 NOTE — PLAN OF CARE
Wound and infection to bone of leg and not foot/ankle.  Referral placed to ortho.    Podiatry consult cancelled

## 2022-12-06 NOTE — ED NOTES
Pt had watery BM in bed. Attempted to clean up when he had to go again. Placed on bed pan per his request. Will finish cleaning when pt is finished. Raw/sensitive area between buttocks

## 2022-12-06 NOTE — CONSULTS
"[facility] Intensive Care  Infectious Disease  Consult Note    Patient Name: Pratik Torres  MRN: [id number]  Admission Date: 12/5/2022  Hospital Length of Stay: 1 days  Attending Physician: Delfin Sanchez MD  Primary Care Provider: Felipe Summers MD     Isolation Status: Special Contact    Patient information was obtained from patient and ER records.    Inpatient consult to Infectious Diseases  Consult performed by: Ami Gillis MD  Consult ordered by: Sawyer Baldwin MD        Attempted to see patient twice, but on toilet or vomiting each time. BCx with GPC (possibly strep or enterococcus). Ortho consulted for stump wound. Could consider changing to vanc/cefepime to avoid vanc/zosyn combo and THEE risk. Needs 2d echo. Full consult will be completed tomorrow          Ami Gillis MD  Infectious Disease  [facility]    Subjective:     Principal Problem: Sepsis    HPI:   27 transgender female with h/o T1DM, R BKA, IBS admitted 12/5 with fevers. Reports several days of F/C prior to admit. Reports presence of chronic right stump wound and reports that she has "a bone infection" that she's following with wound care for.     CT  1. Multifocal patchy opacities seen within the lower lobes, left greater than right.  Findings may be seen with aspiration or infectious process/developing pneumonia in the right clinical setting.  No confluent consolidation seen.  2. Scattered liquid stool seen within the colon which may be seen with diarrheal illness.  3. Hepatosplenomegaly.    Bcx 12/5 with both sets positive for GPC  Blood Culture, Routine Gram stain aer bottle: Gram positive cocci in chains resembling Strep P    Blood Culture, Routine Gram stain leonard bottle: Gram positive cocci in chains resembling Strep P    Blood Culture, Routine Positive results previously called P      Repeat 12/6 pending      Febrile 103 on arrival    Day #2 vanc/zosyn     Xray right tib/fib showed osteomyelitis at the distal " "aspect of the tibia and fibula amputation site. The patient was given fluids, vancomycin, zosyn and was admitted for further management.     ID consulted for "osteomyelitis"      No new subjective & objective note has been filed under this hospital service since the last note was generated.              "

## 2022-12-06 NOTE — PROGRESS NOTES
"[facility]  [facility] Medicine  Progress Note    Patient Name: Pratik Torres  MRN: [id number]  Patient Class: IP- Inpatient   Admission Date: 12/5/2022  Length of Stay: 1 days  Attending Physician: Delfin Sanchez MD  Primary Care Provider: Felipe Summers MD        Subjective:     Principal Problem:Sepsis        HPI:  This is a 27 year old transgender female with a PMHx of T1DM c/b RLE cellulitis s/p right BKA, iron deficiency anemia, IBS-D who presents with fevers.     Patient reports developing fevers, chills, multiple episodes of non bloody emesis and decreased po intake 2 days prior to presentation. Associated symptoms include cough, and SOB that started a day prior. The patient has a chronic right stump wound and reports that he has "a bone infection" that she's following with wound care for. In the ED, he was febrile (39.5), tachycardic (140), tachypnic. Labs showed no leukocytosis (8.7), microcytic anemia (5.9), hyponatremia (128), elevated creatinine (2.1 baseline of 1.2), negative lactic acid (1.0), elevated procalcitonin (25.0), negative troponin (0.017). CT CAP showed multifocal patchy opacities seen within the lower lobes, left greater than right (aspiration versus infectious process). Xray right tib/fib showed osteomyelitis at the distal aspect of the tibia and fibula amputation site. The patient was given fluids, vancomycin, zosyn and was admitted for further management.       Overview/Hospital Course:  Patient admitted for osteo of R stump wound. ID consulted.       Interval History: No new issues     Review of Systems   Constitutional:  Negative for activity change, appetite change and chills.   HENT:  Negative for congestion and dental problem.    Eyes:  Negative for discharge and itching.   Respiratory:  Negative for apnea and chest tightness.    Cardiovascular:  Negative for chest pain.   Gastrointestinal:  Negative for abdominal distention and abdominal pain. "   Genitourinary:  Negative for difficulty urinating and dyspareunia.   Neurological:  Negative for dizziness and facial asymmetry.   Hematological:  Negative for adenopathy.   Psychiatric/Behavioral:  Negative for agitation.    Objective:     Vital Signs (Most Recent):  Temp: 97.9 °F (36.6 °C) (12/06/22 0603)  Pulse: 78 (12/06/22 0648)  Resp: 14 (12/06/22 0157)  BP: 122/70 (12/06/22 0648)  SpO2: 100 % (12/06/22 0648) Vital Signs (24h Range):  Temp:  [97.4 °F (36.3 °C)-103.1 °F (39.5 °C)] 97.9 °F (36.6 °C)  Pulse:  [] 78  Resp:  [10-26] 14  SpO2:  [82 %-100 %] 100 %  BP: ()/(48-82) 122/70     Weight: 98 kg (216 lb)  Body mass index is 29.29 kg/m².    Intake/Output Summary (Last 24 hours) at 12/6/2022 0753  Last data filed at 12/6/2022 0550  Gross per 24 hour   Intake 4303 ml   Output --   Net 4303 ml      Physical Exam  Vitals and nursing note reviewed.   Constitutional:       General: She is not in acute distress.     Appearance: Normal appearance. She is not ill-appearing, toxic-appearing or diaphoretic.   HENT:      Head: Normocephalic and atraumatic.      Nose: Nose normal.   Eyes:      Conjunctiva/sclera: Conjunctivae normal.   Cardiovascular:      Rate and Rhythm: Normal rate and regular rhythm.   Pulmonary:      Effort: No respiratory distress.      Breath sounds: No stridor.   Abdominal:      General: There is no distension.   Skin:     General: Skin is warm and dry.   Neurological:      Mental Status: She is alert and oriented to person, place, and time.   Psychiatric:         Mood and Affect: Mood normal.         Behavior: Behavior normal.         Thought Content: Thought content normal.       Significant Labs: All pertinent labs within the past 24 hours have been reviewed.  BMP:   Recent Labs   Lab 12/06/22  0020      *   K 4.4   CL 99   CO2 21*   BUN 24*   CREATININE 2.3*   CALCIUM 8.2*     CBC:   Recent Labs   Lab 12/05/22  1646 12/06/22  0020   WBC 8.75 10.76   HGB 5.9* 5.9*    HCT 19.5* 19.2*    198       Significant Imaging:       Assessment/Plan:      * Sepsis  This patient does have evidence of infective focus  My overall impression is sepsis. Vital signs were reviewed and noted in progress note.  Antibiotics given-   Antibiotics (From admission, onward)    Start     Stop Route Frequency Ordered    12/06/22 1800  vancomycin 1.5 g in dextrose 5 % 250 mL IVPB (ready to mix)         -- IV Every 24 hours (non-standard times) 12/05/22 1801    12/06/22 0100  piperacillin-tazobactam 4.5 g in dextrose 5 % 100 mL IVPB (ready to mix system)         -- IV Every 8 hours (non-standard times) 12/05/22 2217    12/05/22 1741  vancomycin - pharmacy to dose  (vancomycin IVPB)        See Hyperspace for full Linked Orders Report.    -- IV pharmacy to manage frequency 12/05/22 1642        Cultures were taken-   Microbiology Results (last 7 days)     Procedure Component Value Units Date/Time    Blood culture [[id number]]     Order Status: Sent Specimen: Blood     Blood culture [[id number]]     Order Status: Sent Specimen: Blood     Blood culture x two cultures. Draw prior to antibiotics. [[id number]] Collected: 12/05/22 1646    Order Status: Completed Specimen: Blood from Peripheral, Antecubital, Left Updated: 12/06/22 0655     Blood Culture, Routine Gram stain aer bottle: Gram positive cocci in chains resembling Strep      Positive results previously called    Narrative:      Aerobic and anaerobic    Blood culture x two cultures. Draw prior to antibiotics. [[id number]] Collected: 12/05/22 1654    Order Status: Completed Specimen: Blood from Peripheral, Forearm, Left Updated: 12/06/22 0343     Blood Culture, Routine Gram stain leonard bottle: Gram positive cocci in chains resembling Strep      Results called to and read back by:Sindi ALATORRE at 0243 aw      Gram stain aer bottle: Gram positive cocci in chains resembling Strep      Positive results previously called    Narrative:      Aerobic and anaerobic     Clostridium difficile EIA [[id number]]     Order Status: No result Specimen: Stool     Culture, Respiratory with Gram Stain [[id number]]     Order Status: No result Specimen: Respiratory from Sputum         Latest lactate reviewed, they are-  Recent Labs   Lab 12/05/22  1646 12/06/22  0020   LACTATE 1.0 0.7       Organ dysfunction indicated by Acute kidney injury  Source- Skin/soft tissue versus respiratory     Source control Achieved by-    Continue vancomycin/zosyn for now   F/U blood cultures and de-escalate appropriately   Infectious diease consult   Podiatry consult     Sepsis from osteo of R lower leg/Possible pneumonia and possible bacteremia. Continue Abx. ID consult      Bacteremia  Strep from cultures on 12/5. Not sure if real.  Repeated cultures today- 12/6.  ID consulted       Transgender woman on hormone therapy  Resume estrace     Osteomyelitis of tibia  Presented with fevers   Xray right tib/fib showed osteomyelitis at the distal aspect of the tibia and fibula amputation site    Plan:   - Vancomycin/zosyn as patient presented septic w/ soft BP  - Podiatry/ID consult   - F/U cultures  - NPO at [facility]     Pneumonia  Presented with fevers, SOB, and cough   CT CAP showed multifocal patchy opacities seen within the lower lobes, left greater than right (aspiration versus infectious process)    Plan:   - Vanc/Zosyn for now   - ID consult   - Respiratory cultures  - Legionella urine antigen   - Monitor respiratory symptoms     THEE (acute kidney injury)  Patient with acute kidney injury likely due to pre-renal azotemia THEE is currently stable. Labs reviewed- Renal function/electrolytes with Estimated Creatinine Clearance (based on SCr of 2.3 mg/dL (H))  Female: 48.2 mL/min (A)  Male: 58.5 mL/min (A)  Hover for info according to latest data. Monitor urine output and serial BMP and adjust therapy as needed. Avoid nephrotoxins and renally dose meds for GFR listed above.     IVF   Monitor BMPs    Microcytic  anemia  Unclear etiology   No signs/symptoms of bleeding, patient reports having prior similar episodes where transfusions were needed without any signs of bleeding   Transfuse to keep Hb > 7   Outpatient follow up with GI     Diarrhea  Likely related to IBS-D  R/O C.diff     Type 1 diabetes mellitus with hyperglycemia  Patient's FSGs are controlled on current medication regimen.  Last A1c reviewed-   Lab Results   Component Value Date    HGBA1C 12.2 (H) 06/05/2020     Most recent fingerstick glucose reviewed-   Recent Labs   Lab 12/05/22  1806   POCTGLUCOSE 78     Current correctional scale  Low  Maintain anti-hyperglycemic dose as follows-   Antihyperglycemics (From admission, onward)    Start     Stop Route Frequency Ordered    12/06/22 0715  insulin aspart U-100 pen 10 Units         -- SubQ 3 times daily with meals 12/05/22 2221 12/05/22 2330  insulin detemir U-100 pen 60 Units         -- SubQ 2 times daily 12/05/22 2215        Hold Oral hypoglycemics while patient is in the hospital.    Obesity Body mass index is 29.29 kg/m². weight loss as out patient     VTE Risk Mitigation (From admission, onward)         Ordered     heparin (porcine) injection 5,000 Units  Every 8 hours         12/05/22 2215     IP VTE HIGH RISK PATIENT  Once         12/05/22 2215     Place sequential compression device  Until discontinued         12/05/22 2215                Discharge Planning   HUSSEIN:      Code Status: Full Code   Is the patient medically ready for discharge?:     Reason for patient still in hospital (select all that apply): Patient unstable  Discharge Plan A: Home                  Delfin Pierre MD  Department of Hospital Medicine   [facility] - [place] - Emergency Dept

## 2022-12-06 NOTE — PHARMACY MED REC
"  Admission Medication History     The home medication history was taken by Allison Melendez.    You may go to "Admission" then "Reconcile Home Medications" tabs to review and/or act upon these items.     The home medication list has been updated by the Pharmacy department.   Please read ALL comments highlighted in yellow.   Please address this information as you see fit.    Feel free to contact us if you have any questions or require assistance.      Medications listed below were obtained from: Patient/family and Analytic software- [employer]  (Not in a hospital admission)          Allison Melendez  [phone]               .        "

## 2022-12-06 NOTE — SUBJECTIVE & OBJECTIVE
Past Medical History:   Diagnosis Date    Diabetes mellitus     IBS (irritable bowel syndrome)        Past Surgical History:   Procedure Laterality Date    COLONOSCOPY      INCISION OF PERIRECTAL ABSCESS N/A 6/10/2020    Procedure: INCISION, ABSCESS, PERIRECTAL;  Surgeon: Ronald Santo MD;  Location: [facility] OR [address] [place] [address];  Service: Colon and Rectal;  Laterality: N/A;  PATIENT IS COVID POSITIVE    TOE SURGERY      WOUND DEBRIDEMENT Right 6/10/2020    Procedure: DEBRIDEMENT, WOUND ISCHIORECTAL;  Surgeon: Ronald Santo MD;  Location: [facility] OR [address] [place] [address];  Service: Colon and Rectal;  Laterality: Right;       Review of patient's allergies indicates:   Allergen Reactions    Shrimp        No current facility-administered medications on file prior to encounter.     Current Outpatient Medications on File Prior to Encounter   Medication Sig    DESCOVY 200-25 mg Tab Take 1 tablet by mouth once daily.    insulin aspart U-100 (NOVOLOG) 100 unit/mL injection Inject 10 Units into the skin 3 (three) times daily before meals.    insulin glargine 100 units/mL (3mL) SubQ pen Inject 50 Units into the skin every evening. (Patient taking differently: Inject 60 Units into the skin every evening.)    spironolactone (ALDACTONE) 25 MG tablet Take 25 mg by mouth once daily.    estradioL (ESTRACE) 1 MG tablet Take 1 mg by mouth once daily.    [DISCONTINUED] albuterol (PROVENTIL/VENTOLIN HFA) 90 mcg/actuation inhaler Inhale 1-2 puffs into the lungs every 6 (six) hours as needed for Wheezing. Rescue     Family History    None       Tobacco Use    Smoking status: Never    Smokeless tobacco: Never   Substance and Sexual Activity    Alcohol use: Yes     Comment: occasionally    Drug use: Not Currently    Sexual activity: Not on file     Review of Systems   Constitutional:  Positive for fever.   HENT: Negative.     Eyes: Negative.    Respiratory:  Positive for cough and shortness of breath.    Cardiovascular: Negative.    Gastrointestinal:  Positive for  diarrhea, nausea and vomiting.        Chronic diarrhea related to IBS   Endocrine: Negative.    Genitourinary: Negative.    Musculoskeletal: Negative.    Skin: Negative.    Allergic/Immunologic: Negative.    Neurological: Negative.    Psychiatric/Behavioral: Negative.     Objective:     Vital Signs (Most Recent):  Temp: 98.1 °F (36.7 °C) (12/05/22 2203)  Pulse: 79 (12/05/22 2217)  Resp: 17 (12/05/22 2203)  BP: 103/63 (12/05/22 2217)  SpO2: 100 % (12/05/22 2217) Vital Signs (24h Range):  Temp:  [98.1 °F (36.7 °C)-103.1 °F (39.5 °C)] 98.1 °F (36.7 °C)  Pulse:  [] 79  Resp:  [10-26] 17  SpO2:  [97 %-100 %] 100 %  BP: ()/(48-64) 103/63     Weight: 98 kg (216 lb)  Body mass index is 29.29 kg/m².    Physical Exam  Vitals and nursing note reviewed.   Constitutional:       General: She is not in acute distress.     Appearance: Normal appearance. She is not ill-appearing.   HENT:      Head: Normocephalic and atraumatic.      Nose: Nose normal.      Mouth/Throat:      Mouth: Mucous membranes are moist.   Eyes:      Extraocular Movements: Extraocular movements intact.   Cardiovascular:      Rate and Rhythm: Normal rate.      Pulses: Normal pulses.      Heart sounds: No murmur heard.  Pulmonary:      Effort: Pulmonary effort is normal. No respiratory distress.   Abdominal:      General: Abdomen is flat.      Palpations: Abdomen is soft.      Tenderness: There is no abdominal tenderness.   Musculoskeletal:      Right lower leg: No edema.      Left lower leg: No edema.   Skin:     General: Skin is warm.      Capillary Refill: Capillary refill takes less than 2 seconds.   Neurological:      General: No focal deficit present.      Mental Status: She is alert.   Psychiatric:         Mood and Affect: Mood normal.           Significant Labs: All pertinent labs within the past 24 hours have been reviewed.    Significant Imaging: I have reviewed all pertinent imaging results/findings within the past 24 hours.

## 2022-12-06 NOTE — ASSESSMENT & PLAN NOTE
Patient's FSGs are controlled on current medication regimen.  Last A1c reviewed-   Lab Results   Component Value Date    HGBA1C 12.2 (H) 06/05/2020     Most recent fingerstick glucose reviewed-   Recent Labs   Lab 12/05/22  1806   POCTGLUCOSE 78     Current correctional scale  Low  Maintain anti-hyperglycemic dose as follows-   Antihyperglycemics (From admission, onward)    Start     Stop Route Frequency Ordered    12/06/22 0715  insulin aspart U-100 pen 10 Units         -- SubQ 3 times daily with meals 12/05/22 2221    12/05/22 2330  insulin detemir U-100 pen 60 Units         -- SubQ 2 times daily 12/05/22 2215        Hold Oral hypoglycemics while patient is in the hospital.

## 2022-12-07 ENCOUNTER — ANESTHESIA EVENT (OUTPATIENT)
Dept: SURGERY | Facility: HOSPITAL | Age: 27
DRG: 474 | End: 2022-12-07
Payer: MEDICARE

## 2022-12-07 PROBLEM — B95.4 BACTERIAL INFECTION DUE TO STREPTOCOCCUS, GROUP G: Status: ACTIVE | Noted: 2022-12-07

## 2022-12-07 LAB
ANION GAP SERPL CALC-SCNC: 5 MMOL/L (ref 8–16)
ASCENDING AORTA: 2.63 CM
AV INDEX (PROSTH): 0.81
AV MEAN GRADIENT: 3 MMHG
AV PEAK GRADIENT: 5 MMHG
AV VALVE AREA: 3.46 CM2
AV VELOCITY RATIO: 0.78
BSA FOR ECHO PROCEDURE: 2.23 M2
BUN SERPL-MCNC: 28 MG/DL (ref 6–20)
CALCIUM SERPL-MCNC: 8.2 MG/DL (ref 8.7–10.5)
CHLORIDE SERPL-SCNC: 104 MMOL/L (ref 95–110)
CO2 SERPL-SCNC: 23 MMOL/L (ref 23–29)
CREAT SERPL-MCNC: 2.9 MG/DL (ref 0.5–1.4)
CV ECHO LV RWT: 0.45 CM
DOP CALC AO PEAK VEL: 1.07 M/S
DOP CALC AO VTI: 22.4 CM
DOP CALC LVOT AREA: 4.3 CM2
DOP CALC LVOT DIAMETER: 2.33 CM
DOP CALC LVOT PEAK VEL: 0.83 M/S
DOP CALC LVOT STROKE VOLUME: 77.56 CM3
DOP CALCLVOT PEAK VEL VTI: 18.2 CM
E WAVE DECELERATION TIME: 80.75 MSEC
E/A RATIO: 1.4
E/E' RATIO: 9.25 M/S
ECHO LV POSTERIOR WALL: 1.2 CM (ref 0.6–1.1)
EJECTION FRACTION: 55 %
EST. GFR  (NO RACE VARIABLE): 29 ML/MIN/1.73 M^2
FRACTIONAL SHORTENING: 24 % (ref 28–44)
GLUCOSE SERPL-MCNC: 97 MG/DL (ref 70–110)
HBV CORE AB SERPL QL IA: NORMAL
HBV SURFACE AB SER-ACNC: 20.79 MIU/ML
HBV SURFACE AB SER-ACNC: REACTIVE M[IU]/ML
HBV SURFACE AG SERPL QL IA: NORMAL
HCV AB SERPL QL IA: NORMAL
HIV 1+2 AB+HIV1 P24 AG SERPL QL IA: NORMAL
INTERVENTRICULAR SEPTUM: 1.12 CM (ref 0.6–1.1)
IVC DIAMETER: 2.2 CM
IVRT: 176.97 MSEC
LA MAJOR: 5.59 CM
LA MINOR: 5.06 CM
LEFT ATRIUM SIZE: 4.22 CM
LEFT INTERNAL DIMENSION IN SYSTOLE: 4.08 CM (ref 2.1–4)
LEFT VENTRICLE DIASTOLIC VOLUME INDEX: 63.11 ML/M2
LEFT VENTRICLE DIASTOLIC VOLUME: 138.84 ML
LEFT VENTRICLE MASS INDEX: 113 G/M2
LEFT VENTRICLE SYSTOLIC VOLUME INDEX: 33.4 ML/M2
LEFT VENTRICLE SYSTOLIC VOLUME: 73.54 ML
LEFT VENTRICULAR INTERNAL DIMENSION IN DIASTOLE: 5.36 CM (ref 3.5–6)
LEFT VENTRICULAR MASS: 249.36 G
LV LATERAL E/E' RATIO: 8.22 M/S
LV SEPTAL E/E' RATIO: 10.57 M/S
LVOT MG: 1.69 MMHG
LVOT MV: 0.61 CM/S
MV PEAK A VEL: 0.53 M/S
MV PEAK E VEL: 0.74 M/S
MV STENOSIS PRESSURE HALF TIME: 23.42 MS
MV VALVE AREA P 1/2 METHOD: 9.39 CM2
PISA TR MAX VEL: 2.77 M/S
POCT GLUCOSE: 102 MG/DL (ref 70–110)
POCT GLUCOSE: 165 MG/DL (ref 70–110)
POTASSIUM SERPL-SCNC: 4.2 MMOL/L (ref 3.5–5.1)
PULM VEIN S/D RATIO: 1.1
PV PEAK D VEL: 0.31 M/S
PV PEAK S VEL: 0.34 M/S
PV PEAK VELOCITY: 0.87 CM/S
RA MAJOR: 4.87 CM
RA PRESSURE: 3 MMHG
RIGHT VENTRICULAR END-DIASTOLIC DIMENSION: 3.92 CM
RPR SER QL: NORMAL
RV TISSUE DOPPLER FREE WALL SYSTOLIC VELOCITY 1 (APICAL 4 CHAMBER VIEW): 0.01 CM/S
SINUS: 3.16 CM
SODIUM SERPL-SCNC: 132 MMOL/L (ref 136–145)
STJ: 2.11 CM
TDI LATERAL: 0.09 M/S
TDI SEPTAL: 0.07 M/S
TDI: 0.08 M/S
TR MAX PG: 31 MMHG
TRICUSPID ANNULAR PLANE SYSTOLIC EXCURSION: 1.81 CM
TV PEAK GRADIENT: 1.63 MMHG
TV REST PULMONARY ARTERY PRESSURE: 34 MMHG
VANCOMYCIN TROUGH SERPL-MCNC: 30.9 UG/ML (ref 10–22)

## 2022-12-07 PROCEDURE — 99233 PR SUBSEQUENT HOSPITAL CARE,LEVL III: ICD-10-PCS | Mod: ,,, | Performed by: INTERNAL MEDICINE

## 2022-12-07 PROCEDURE — 99233 SBSQ HOSP IP/OBS HIGH 50: CPT | Mod: ,,, | Performed by: INTERNAL MEDICINE

## 2022-12-07 PROCEDURE — 87340 HEPATITIS B SURFACE AG IA: CPT | Performed by: INTERNAL MEDICINE

## 2022-12-07 PROCEDURE — 63600175 PHARM REV CODE 636 W HCPCS: Performed by: STUDENT IN AN ORGANIZED HEALTH CARE EDUCATION/TRAINING PROGRAM

## 2022-12-07 PROCEDURE — 27000207 HC ISOLATION

## 2022-12-07 PROCEDURE — 36415 COLL VENOUS BLD VENIPUNCTURE: CPT | Performed by: INTERNAL MEDICINE

## 2022-12-07 PROCEDURE — 86803 HEPATITIS C AB TEST: CPT | Performed by: INTERNAL MEDICINE

## 2022-12-07 PROCEDURE — 86706 HEP B SURFACE ANTIBODY: CPT | Mod: 91 | Performed by: INTERNAL MEDICINE

## 2022-12-07 PROCEDURE — 99900035 HC TECH TIME PER 15 MIN (STAT)

## 2022-12-07 PROCEDURE — 63600175 PHARM REV CODE 636 W HCPCS: Performed by: INTERNAL MEDICINE

## 2022-12-07 PROCEDURE — 36415 COLL VENOUS BLD VENIPUNCTURE: CPT | Performed by: EMERGENCY MEDICINE

## 2022-12-07 PROCEDURE — 87389 HIV-1 AG W/HIV-1&-2 AB AG IA: CPT | Performed by: INTERNAL MEDICINE

## 2022-12-07 PROCEDURE — 21400001 HC TELEMETRY ROOM

## 2022-12-07 PROCEDURE — 80202 ASSAY OF VANCOMYCIN: CPT | Performed by: EMERGENCY MEDICINE

## 2022-12-07 PROCEDURE — 80048 BASIC METABOLIC PNL TOTAL CA: CPT | Performed by: INTERNAL MEDICINE

## 2022-12-07 PROCEDURE — 25000003 PHARM REV CODE 250: Performed by: INTERNAL MEDICINE

## 2022-12-07 PROCEDURE — 25000003 PHARM REV CODE 250: Performed by: STUDENT IN AN ORGANIZED HEALTH CARE EDUCATION/TRAINING PROGRAM

## 2022-12-07 PROCEDURE — 86704 HEP B CORE ANTIBODY TOTAL: CPT | Performed by: INTERNAL MEDICINE

## 2022-12-07 PROCEDURE — 86592 SYPHILIS TEST NON-TREP QUAL: CPT | Performed by: INTERNAL MEDICINE

## 2022-12-07 RX ORDER — HYDROCODONE BITARTRATE AND ACETAMINOPHEN 10; 325 MG/1; MG/1
1 TABLET ORAL EVERY 6 HOURS PRN
Status: DISCONTINUED | OUTPATIENT
Start: 2022-12-07 | End: 2022-12-12 | Stop reason: HOSPADM

## 2022-12-07 RX ORDER — CEFEPIME HYDROCHLORIDE 1 G/50ML
2 INJECTION, SOLUTION INTRAVENOUS
Status: DISCONTINUED | OUTPATIENT
Start: 2022-12-07 | End: 2022-12-07

## 2022-12-07 RX ORDER — HYDROMORPHONE HYDROCHLORIDE 1 MG/ML
1 INJECTION, SOLUTION INTRAMUSCULAR; INTRAVENOUS; SUBCUTANEOUS ONCE
Status: COMPLETED | OUTPATIENT
Start: 2022-12-07 | End: 2022-12-07

## 2022-12-07 RX ORDER — CEFTRIAXONE 2 G/50ML
2 INJECTION, SOLUTION INTRAVENOUS
Status: DISCONTINUED | OUTPATIENT
Start: 2022-12-07 | End: 2022-12-12

## 2022-12-07 RX ORDER — SODIUM CHLORIDE 9 MG/ML
INJECTION, SOLUTION INTRAVENOUS CONTINUOUS
Status: DISCONTINUED | OUTPATIENT
Start: 2022-12-07 | End: 2022-12-10

## 2022-12-07 RX ORDER — VANCOMYCIN HCL IN 5 % DEXTROSE 1G/250ML
1000 PLASTIC BAG, INJECTION (ML) INTRAVENOUS
Status: DISCONTINUED | OUTPATIENT
Start: 2022-12-07 | End: 2022-12-07

## 2022-12-07 RX ADMIN — CEFTRIAXONE SODIUM 2 G: 2 INJECTION, SOLUTION INTRAVENOUS at 11:12

## 2022-12-07 RX ADMIN — SODIUM CHLORIDE: 0.9 INJECTION, SOLUTION INTRAVENOUS at 10:12

## 2022-12-07 RX ADMIN — ESTRADIOL 1 MG: 1 TABLET ORAL at 09:12

## 2022-12-07 RX ADMIN — SPIRONOLACTONE 25 MG: 25 TABLET, FILM COATED ORAL at 09:12

## 2022-12-07 RX ADMIN — CEFEPIME 2 G: 2 INJECTION, POWDER, FOR SOLUTION INTRAVENOUS at 11:12

## 2022-12-07 RX ADMIN — PIPERACILLIN AND TAZOBACTAM 4.5 G: 4; .5 INJECTION, POWDER, LYOPHILIZED, FOR SOLUTION INTRAVENOUS; PARENTERAL at 06:12

## 2022-12-07 RX ADMIN — INSULIN DETEMIR 60 UNITS: 100 INJECTION, SOLUTION SUBCUTANEOUS at 09:12

## 2022-12-07 RX ADMIN — HYDROMORPHONE HYDROCHLORIDE 1 MG: 1 INJECTION, SOLUTION INTRAMUSCULAR; INTRAVENOUS; SUBCUTANEOUS at 04:12

## 2022-12-07 RX ADMIN — INSULIN ASPART 10 UNITS: 100 INJECTION, SOLUTION INTRAVENOUS; SUBCUTANEOUS at 05:12

## 2022-12-07 RX ADMIN — SODIUM CHLORIDE: 0.9 INJECTION, SOLUTION INTRAVENOUS at 11:12

## 2022-12-07 NOTE — PROGRESS NOTES
Pharmacist Renal Dose Adjustment Note    Pratik Torres is a 27 y.o. adult being treated with the medication cefepime    Patient Data:    Vital Signs (Most Recent):  Temp: 98.1 °F (36.7 °C) (12/07/22 0412)  Pulse: 80 (12/07/22 0412)  Resp: 18 (12/07/22 0412)  BP: 106/64 (12/07/22 0933)  SpO2: 100 % (12/07/22 0412) Vital Signs (72h Range):  Temp:  [97.3 °F (36.3 °C)-103.1 °F (39.5 °C)]   Pulse:  []   Resp:  [10-26]   BP: ()/(48-85)   SpO2:  [82 %-100 %]      Recent Labs   Lab 12/05/22  1646 12/06/22  0020 12/06/22  0954   CREATININE 2.1* 2.3* 2.5*     Serum creatinine: 2.5 mg/dL (H) 12/06/22 0954  Hover for info  Estimated creatinine clearance: 44.3 mL/min (A) (Female)  Estimated creatinine clearance: 53.8 mL/min (A) (Male)    Medication:cefepime dose: 2 gm frequency q8h will be changed to medication:cefepime dose:2 gm frequency:q12h    Pharmacist's Name: Tulio Mckeon  Pharmacist's Extension: [phone]

## 2022-12-07 NOTE — NURSING
Called to patient's room, pt stated that I need to page the ortho physician and get them to come see him now or he is going to eat regardless.     0807: Valentina Aleman, ortho surgeon notified.

## 2022-12-07 NOTE — PROGRESS NOTES
"[facility] Medicine  Progress Note    Patient Name: Pratik Torres  MRN: [id number]  Patient Class: IP- Inpatient   Admission Date: 12/5/2022  Length of Stay: 2 days  Attending Physician: Delfin Sanchez MD  Primary Care Provider: Felipe Summers MD        Subjective:     Principal Problem:Sepsis        HPI:  This is a 27 year old transgender female with a PMHx of T1DM c/b RLE cellulitis s/p right BKA, iron deficiency anemia, IBS-D who presents with fevers.     Patient reports developing fevers, chills, multiple episodes of non bloody emesis and decreased po intake 2 days prior to presentation. Associated symptoms include cough, and SOB that started a day prior. The patient has a chronic right stump wound and reports that he has "a bone infection" that she's following with wound care for. In the ED, he was febrile (39.5), tachycardic (140), tachypnic. Labs showed no leukocytosis (8.7), microcytic anemia (5.9), hyponatremia (128), elevated creatinine (2.1 baseline of 1.2), negative lactic acid (1.0), elevated procalcitonin (25.0), negative troponin (0.017). CT CAP showed multifocal patchy opacities seen within the lower lobes, left greater than right (aspiration versus infectious process). Xray right tib/fib showed osteomyelitis at the distal aspect of the tibia and fibula amputation site. The patient was given fluids, vancomycin, zosyn and was admitted for further management.       Overview/Hospital Course:  Patient admitted for osteo of R stump wound. ID consulted.  Patient had + blood cultures. Ortho consulted for I and D and stump revision.       Interval History: No new issues     Review of Systems   Constitutional:  Negative for activity change, appetite change and chills.   HENT:  Negative for congestion and dental problem.    Eyes:  Negative for discharge and itching.   Respiratory:  Negative for apnea and chest tightness.    Cardiovascular:  Negative for chest pain. "   Gastrointestinal:  Negative for abdominal distention and abdominal pain.   Genitourinary:  Negative for difficulty urinating and dyspareunia.   Neurological:  Negative for dizziness and facial asymmetry.   Hematological:  Negative for adenopathy.   Psychiatric/Behavioral:  Negative for agitation.    Objective:     Vital Signs (Most Recent):  Temp: 98.1 °F (36.7 °C) (12/07/22 0412)  Pulse: 80 (12/07/22 0412)  Resp: 18 (12/07/22 0412)  BP: 106/64 (12/07/22 0933)  SpO2: 100 % (12/07/22 0412) Vital Signs (24h Range):  Temp:  [97.5 °F (36.4 °C)-98.1 °F (36.7 °C)] 98.1 °F (36.7 °C)  Pulse:  [75-88] 80  Resp:  [16-18] 18  SpO2:  [99 %-100 %] 100 %  BP: ()/(54-85) 106/64     Weight: 97.9 kg (215 lb 13.3 oz)  Body mass index is 29.27 kg/m².    Intake/Output Summary (Last 24 hours) at 12/7/2022 1058  Last data filed at 12/6/2022 1842  Gross per 24 hour   Intake 319.94 ml   Output --   Net 319.94 ml      Physical Exam  Vitals and nursing note reviewed.   Constitutional:       General: She is not in acute distress.     Appearance: Normal appearance. She is not ill-appearing, toxic-appearing or diaphoretic.   HENT:      Head: Normocephalic and atraumatic.      Nose: Nose normal.   Eyes:      Conjunctiva/sclera: Conjunctivae normal.   Cardiovascular:      Rate and Rhythm: Normal rate and regular rhythm.   Pulmonary:      Effort: No respiratory distress.      Breath sounds: No stridor.   Abdominal:      General: There is no distension.   Skin:     General: Skin is warm and dry.   Neurological:      Mental Status: She is alert and oriented to person, place, and time.   Psychiatric:         Mood and Affect: Mood normal.         Behavior: Behavior normal.         Thought Content: Thought content normal.       Significant Labs: All pertinent labs within the past 24 hours have been reviewed.  BMP:   Recent Labs   Lab 12/06/22  0954   GLU 87   *   K 3.8      CO2 22*   BUN 22*   CREATININE 2.5*   CALCIUM 8.7   MG 1.6      CBC:   Recent Labs   Lab 12/05/22  1646 12/06/22  0020 12/06/22  0954   WBC 8.75 10.76 10.43   HGB 5.9* 5.9* 8.2*   HCT 19.5* 19.2* 25.9*    198 233       Significant Imaging:       Assessment/Plan:      * Sepsis  This patient does have evidence of infective focus  My overall impression is sepsis. Vital signs were reviewed and noted in progress note.  Antibiotics given-   Antibiotics (From admission, onward)    Start     Stop Route Frequency Ordered    12/07/22 1200  cefepime in dextrose 5 % IVPB 2 g         -- IV Every 8 hours (non-standard times) 12/07/22 1057    12/06/22 1800  vancomycin 1.5 g in dextrose 5 % 250 mL IVPB (ready to mix)         -- IV Every 24 hours (non-standard times) 12/05/22 1801    12/05/22 1741  vancomycin - pharmacy to dose  (vancomycin IVPB)        [facility].    -- IV pharmacy to manage frequency 12/05/22 1642        Cultures were taken-   Microbiology Results (last 7 days)     Procedure Component Value Units Date/Time    Clostridium difficile EIA [[id number]] Collected: 12/06/22 2256    Order Status: Sent Specimen: Stool Updated: 12/06/22 2256    Blood culture [[id number]] Collected: 12/06/22 0954    Order Status: Completed Specimen: Blood from Peripheral, Right Hand Updated: 12/06/22 1712     Blood Culture, Routine No Growth to date    Blood culture [[id number]] Collected: 12/06/22 0954    Order Status: Completed Specimen: Blood from Antecubital, Right Updated: 12/06/22 1712     Blood Culture, Routine No Growth to date    Blood culture x two cultures. Draw prior to antibiotics. [[id number]] Collected: 12/05/22 1646    Order Status: Completed Specimen: Blood from Peripheral, Antecubital, Left Updated: 12/06/22 0908     Blood Culture, Routine Gram stain aer bottle: Gram positive cocci in chains resembling Strep      Gram stain leonard bottle: Gram positive cocci in chains resembling Strep      Positive results previously called    Narrative:       Aerobic and anaerobic    Blood culture x two cultures. Draw prior to antibiotics. [[id number]] Collected: 12/05/22 1654    Order Status: Completed Specimen: Blood from Peripheral, Forearm, Left Updated: 12/06/22 0343     Blood Culture, Routine Gram stain leonard bottle: Gram positive cocci in chains resembling Strep      Results called to and read back by:Sindi ALATORRE at 0243 aw      Gram stain aer bottle: Gram positive cocci in chains resembling Strep      Positive results previously called    Narrative:      Aerobic and anaerobic    Clostridium difficile EIA [[id number]]     Order Status: Canceled Specimen: Stool     Culture, Respiratory with Gram Stain [[id number]]     Order Status: No result Specimen: Respiratory from Sputum         Latest lactate reviewed, they are-  Recent Labs   Lab 12/05/22  1646 12/06/22  0020   LACTATE 1.0 0.7       Organ dysfunction indicated by Acute kidney injury  Source- Skin/soft tissue versus respiratory     Source control Achieved by-    Continue vancomycin/zosyn for now   F/U blood cultures and de-escalate appropriately   Infectious diease consult   Podiatry consult     Sepsis from osteo of R lower leg/Possible pneumonia and possible bacteremia. Continue Abx. ID consult  Cefepime and vanc.  Ortho consulted for I and D and stump revision        Bacteremia  Strep from cultures on 12/5. Not sure if real.  Repeated cultures today- 12/6.  ID consulted       Transgender woman on hormone therapy  Resume estrace     Osteomyelitis of tibia  Presented with fevers   Xray right tib/fib showed osteomyelitis at the distal aspect of the tibia and fibula amputation site    Plan:   - Vancomycin/zosyn as patient presented septic w/ soft BP  - Podiatry/ID consult   - F/U cultures  - NPO at MN     Pneumonia  Presented with fevers, SOB, and cough   CT CAP showed multifocal patchy opacities seen within the lower lobes, left greater than right (aspiration versus infectious process)    Plan:   - Vanc/Zosyn for  now   - ID consult   - Respiratory cultures  - Legionella urine antigen   - Monitor respiratory symptoms     THEE (acute kidney injury)  Patient with acute kidney injury likely due to pre-renal azotemia THEE is currently stable. Labs reviewed- Renal function/electrolytes with Estimated Creatinine Clearance (based on SCr of 2.5 mg/dL (H))  Female: 44.3 mL/min (A)  Male: 53.8 mL/min (A)  Hover for info according to latest data. Monitor urine output and serial BMP and adjust therapy as needed. Avoid nephrotoxins and renally dose meds for GFR listed above.     IVF   Monitor BMPs    Microcytic anemia  Unclear etiology   No signs/symptoms of bleeding, patient reports having prior similar episodes where transfusions were needed without any signs of bleeding   Transfuse to keep Hb > 7   Outpatient follow up with GI     Diarrhea  Likely related to IBS-D  R/O C.diff     Type 1 diabetes mellitus with hyperglycemia  Patient's FSGs are controlled on current medication regimen.  Last A1c reviewed-   Lab Results   Component Value Date    HGBA1C 12.2 (H) 06/05/2020     Most recent fingerstick glucose reviewed-   Recent Labs   Lab 12/05/22  1806   POCTGLUCOSE 78     Current correctional scale  Low  Maintain anti-hyperglycemic dose as follows-   Antihyperglycemics (From admission, onward)    Start     Stop Route Frequency Ordered    12/06/22 0715  insulin aspart U-100 pen 10 Units         -- SubQ 3 times daily with meals 12/05/22 2221 12/05/22 2330  insulin detemir U-100 pen 60 Units         -- SubQ 2 times daily 12/05/22 2215        Hold Oral hypoglycemics while patient is in the hospital.    Acute pancreatitis without infection or necrosis          VTE Risk Mitigation (From admission, onward)         Ordered     heparin (porcine) injection 5,000 Units  Every 8 hours         12/05/22 2215     IP VTE HIGH RISK PATIENT  Once         12/05/22 2215     Place sequential compression device  Until discontinued         12/05/22 2215                 Discharge Planning   HUSSEIN:      Code Status: Full Code   Is the patient medically ready for discharge?:     Reason for patient still in hospital (select all that apply): Patient unstable and Patient trending condition  Discharge Plan A: Home          I and D and stump revision tomorrow         Delfin Pierre MD  Department of [facility] Medicine   [facility]

## 2022-12-07 NOTE — PLAN OF CARE
Problem: Diabetes Comorbidity  Goal: Blood Glucose Level Within Targeted Range  Outcome: Ongoing, Progressing  Intervention: Monitor and Manage Glycemia  Flowsheets (Taken 12/7/2022 0042)  Glycemic Management:   blood glucose monitored   supplemental insulin given   oral hydration promoted

## 2022-12-07 NOTE — NURSING
"1135: Pt refusing his lab.     Pt complaining of L shoulder pain. Norco pulled and offered to patient. Pt stated "that big white pill does not help him, and he doesn't want that."     Dr. Sanchez notified.  "

## 2022-12-07 NOTE — CONSULTS
Consult received. NPO at midnight in case surgery is indicated. Chart and xrays reviewed today with Dr Tolliver. Will see patient; full note to follow.

## 2022-12-07 NOTE — SUBJECTIVE & OBJECTIVE
Interval History: No new issues     Review of Systems   Constitutional:  Negative for activity change, appetite change and chills.   HENT:  Negative for congestion and dental problem.    Eyes:  Negative for discharge and itching.   Respiratory:  Negative for apnea and chest tightness.    Cardiovascular:  Negative for chest pain.   Gastrointestinal:  Negative for abdominal distention and abdominal pain.   Genitourinary:  Negative for difficulty urinating and dyspareunia.   Neurological:  Negative for dizziness and facial asymmetry.   Hematological:  Negative for adenopathy.   Psychiatric/Behavioral:  Negative for agitation.    Objective:     Vital Signs (Most Recent):  Temp: 98.1 °F (36.7 °C) (12/07/22 0412)  Pulse: 80 (12/07/22 0412)  Resp: 18 (12/07/22 0412)  BP: 106/64 (12/07/22 0933)  SpO2: 100 % (12/07/22 0412) Vital Signs (24h Range):  Temp:  [97.5 °F (36.4 °C)-98.1 °F (36.7 °C)] 98.1 °F (36.7 °C)  Pulse:  [75-88] 80  Resp:  [16-18] 18  SpO2:  [99 %-100 %] 100 %  BP: ()/(54-85) 106/64     Weight: 97.9 kg (215 lb 13.3 oz)  Body mass index is 29.27 kg/m².    Intake/Output Summary (Last 24 hours) at 12/7/2022 1058  Last data filed at 12/6/2022 1842  Gross per 24 hour   Intake 319.94 ml   Output --   Net 319.94 ml      Physical Exam  Vitals and nursing note reviewed.   Constitutional:       General: She is not in acute distress.     Appearance: Normal appearance. She is not ill-appearing, toxic-appearing or diaphoretic.   HENT:      Head: Normocephalic and atraumatic.      Nose: Nose normal.   Eyes:      Conjunctiva/sclera: Conjunctivae normal.   Cardiovascular:      Rate and Rhythm: Normal rate and regular rhythm.   Pulmonary:      Effort: No respiratory distress.      Breath sounds: No stridor.   Abdominal:      General: There is no distension.   Skin:     General: Skin is warm and dry.   Neurological:      Mental Status: She is alert and oriented to person, place, and time.   Psychiatric:         Mood and  Affect: Mood normal.         Behavior: Behavior normal.         Thought Content: Thought content normal.       Significant Labs: All pertinent labs within the past 24 hours have been reviewed.  BMP:   Recent Labs   Lab 12/06/22  0954   GLU 87   *   K 3.8      CO2 22*   BUN 22*   CREATININE 2.5*   CALCIUM 8.7   MG 1.6     CBC:   Recent Labs   Lab 12/05/22  1646 12/06/22  0020 12/06/22  0954   WBC 8.75 10.76 10.43   HGB 5.9* 5.9* 8.2*   HCT 19.5* 19.2* 25.9*    198 233       Significant Imaging:

## 2022-12-07 NOTE — ASSESSMENT & PLAN NOTE
Patient with acute kidney injury likely due to pre-renal azotemia THEE is currently stable. Labs reviewed- Renal function/electrolytes with Estimated Creatinine Clearance (based on SCr of 2.5 mg/dL (H))  Female: 44.3 mL/min (A)  Male: 53.8 mL/min (A)  Hover for info according to latest data. Monitor urine output and serial BMP and adjust therapy as needed. Avoid nephrotoxins and renally dose meds for GFR listed above.     IVF   Monitor BMPs

## 2022-12-07 NOTE — CONSULTS
"[facility] - Telemetry  Wound Care    Patient Name:  Pratik Torres   MRN:  [id number]  Date: 12/7/2022  Diagnosis: Sepsis    History:     Past Medical History:   Diagnosis Date    Diabetes mellitus     IBS (irritable bowel syndrome)        Social History     Socioeconomic History    Marital status: Single   Tobacco Use    Smoking status: Never    Smokeless tobacco: Never   Substance and Sexual Activity    Alcohol use: Yes     Comment: occasionally    Drug use: Not Currently       Precautions:     Allergies as of 12/05/2022 - Reviewed 12/05/2022   Allergen Reaction Noted    Shrimp  08/31/2018       [facility] Assessment Details/Treatment   Consulted for altered skin integrity stump  A 27 year old transgender admitted 12/5/22 from home with sepsis; transgender woman on hormone therapy; osteomyelitis of tibia; pneumonia; THEE; microcytic anemia; diarrhea; DM I with hyperglycemia  12/6 WBC 10.43 Hgb 8.2 Hct 25.9 A1C 10.2  12/5 Alb 1.6 Weight 215 lb   On Isoflex mattress; Koby score 19  According to notes, patient followed by wound care at [facility]  12/7 Ortho consult- Right BKA with dehiscence with osteomyelitis; best treatment likely I&D with revision BKA- not emergent or urgent; plan for OR tomorrow  Assessment:  Photodocumentation    Right BKA- Bone + tissue protruding through skin    Right lateral stump- Full thickness opening 3 cm with red base and serous drainage.  Treatment:  Cleansed stump with Vashe. Applied Aquacel hydrofiber to openings. Covered with dry gauze and wrapped with soft roll, Kerlix roll and 4" Coban  For surgery tomorrow.     12/07/2022    "

## 2022-12-07 NOTE — PROGRESS NOTES
Pt rude and confrontational. States he never wanted to come to this facility and wanted to go to [facility], where he has been treated in the past. Pt is apathetic to his condition and wants to leave the hospital.  VSSAF currently    Rt LE- large open wound with exposed bone and s/s d/c. No gross purulence. Able to fully extend at the knee. Adjacent skin appears healthy    WBC 10  Hct 25.9  Xrays- air in the wound. Distal tibia and fibula with chronic appearing changes c/w osteomyelitis    A/P Rt BKA dehiscence with osteomyelitis  Current condition is stable and pt is no longer septic  Ok for transfer or discharge- f/u at [facility]. Best treatment is likely I&D with revision BKA but it is not emergent or urgent at this time

## 2022-12-07 NOTE — PLAN OF CARE
Problem: Adult Inpatient Plan of Care  Goal: Optimal Comfort and Wellbeing  Intervention: Monitor Pain and Promote Comfort  Flowsheets (Taken 12/7/2022 0556)  Pain Management Interventions:   care clustered   pillow support provided     Problem: Infection Progression (Sepsis/Septic Shock)  Goal: Absence of Infection Signs and Symptoms  Intervention: Promote Recovery  Flowsheets (Taken 12/7/2022 0556)  Sleep/Rest Enhancement:   awakenings minimized   consistent schedule promoted   regular sleep/rest pattern promoted   relaxation techniques promoted  Activity Management: Up to bedside commode - L3

## 2022-12-07 NOTE — ASSESSMENT & PLAN NOTE
This patient does have evidence of infective focus  My overall impression is sepsis. Vital signs were reviewed and noted in progress note.  Antibiotics given-   Antibiotics (From admission, onward)    Start     Stop Route Frequency Ordered    12/07/22 1200  cefepime in dextrose 5 % IVPB 2 g         -- IV Every 8 hours (non-standard times) 12/07/22 1057    12/06/22 1800  vancomycin 1.5 g in dextrose 5 % 250 mL IVPB (ready to mix)         -- IV Every 24 hours (non-standard times) 12/05/22 1801    12/05/22 1741  vancomycin - pharmacy to dose  (vancomycin IVPB)        See [facility] for full Linked Orders Report.    -- IV pharmacy to manage frequency 12/05/22 1642        Cultures were taken-   Microbiology Results (last 7 days)     Procedure Component Value Units Date/Time    Clostridium difficile EIA [[id number]] Collected: 12/06/22 2256    Order Status: Sent Specimen: Stool Updated: 12/06/22 2256    Blood culture [[id number]] Collected: 12/06/22 0954    Order Status: Completed Specimen: Blood from Peripheral, Right Hand Updated: 12/06/22 1712     Blood Culture, Routine No Growth to date    Blood culture [[id number]] Collected: 12/06/22 0954    Order Status: Completed Specimen: Blood from Antecubital, Right Updated: 12/06/22 1712     Blood Culture, Routine No Growth to date    Blood culture x two cultures. Draw prior to antibiotics. [[id number]] Collected: 12/05/22 1646    Order Status: Completed Specimen: Blood from Peripheral, Antecubital, Left Updated: 12/06/22 0908     Blood Culture, Routine Gram stain aer bottle: Gram positive cocci in chains resembling Strep      Gram stain leonard bottle: Gram positive cocci in chains resembling Strep      Positive results previously called    Narrative:      Aerobic and anaerobic    Blood culture x two cultures. Draw prior to antibiotics. [[id number]] Collected: 12/05/22 1654    Order Status: Completed Specimen: Blood from Peripheral, Forearm, Left Updated: 12/06/22 0343     Blood  Culture, Routine Gram stain leonard bottle: Gram positive cocci in chains resembling Strep      Results called to and read back by:Sindi ALATORRE at 0243 aw      Gram stain aer bottle: Gram positive cocci in chains resembling Strep      Positive results previously called    Narrative:      Aerobic and anaerobic    Clostridium difficile EIA [[id number]]     Order Status: Canceled Specimen: Stool     Culture, Respiratory with Gram Stain [[id number]]     Order Status: No result Specimen: Respiratory from Sputum         Latest lactate reviewed, they are-  Recent Labs   Lab 12/05/22  1646 12/06/22  0020   LACTATE 1.0 0.7       Organ dysfunction indicated by Acute kidney injury  Source- Skin/soft tissue versus respiratory     Source control Achieved by-    Continue vancomycin/zosyn for now   F/U blood cultures and de-escalate appropriately   Infectious diease consult   Podiatry consult     Sepsis from osteo of R lower leg/Possible pneumonia and possible bacteremia. Continue Abx. ID consult  Cefepime and vanc.  Ortho consulted for I and D and stump revision

## 2022-12-07 NOTE — PROGRESS NOTES
Called back to the pt's room for further discussion. Pt spoke with his PCP and has had a change of heart, wants to be treated here. We discussed pt/physician relationships and the need for us to be pulling in the same direction. Pt no longer wants transfer or discharge    I think the pt is somewhat volatile and needs to further demonstrate some appropriate behavior toward the staff. His change of heart will be reconsidered and if we can maintain some appropriate discussion about treatment, then we will plan for I&D, revision Rt BKA tomorrow.  If pt is belligerent, the case will be cancelled as it is not emergent or urgent and pt will be discharged to follow up with pt's existing providers.  Ok to eat. NPO after MN  Pt is aware that a new prosthesis will eventually be required and there is a chance that a revision BKA stump will be too short for a prosthesis

## 2022-12-07 NOTE — NURSING
Patient vomited approximately 100 cc of emesis. Offered patient nausea medication patient refused, and stated it was the lunch that is causing him to vomit.

## 2022-12-08 ENCOUNTER — ANESTHESIA (OUTPATIENT)
Dept: SURGERY | Facility: HOSPITAL | Age: 27
DRG: 474 | End: 2022-12-08
Payer: MEDICARE

## 2022-12-08 LAB
ABO + RH BLD: NORMAL
ANION GAP SERPL CALC-SCNC: 6 MMOL/L (ref 8–16)
ANION GAP SERPL CALC-SCNC: 6 MMOL/L (ref 8–16)
BACTERIA BLD CULT: ABNORMAL
BASOPHILS # BLD AUTO: 0.03 K/UL (ref 0–0.2)
BASOPHILS NFR BLD: 0.4 % (ref 0–1.9)
BLD GP AB SCN CELLS X3 SERPL QL: NORMAL
BLD PROD TYP BPU: NORMAL
BLD PROD TYP BPU: NORMAL
BLOOD UNIT EXPIRATION DATE: NORMAL
BLOOD UNIT EXPIRATION DATE: NORMAL
BLOOD UNIT TYPE CODE: 5100
BLOOD UNIT TYPE CODE: 5100
BLOOD UNIT TYPE: NORMAL
BLOOD UNIT TYPE: NORMAL
BUN SERPL-MCNC: 28 MG/DL (ref 6–20)
BUN SERPL-MCNC: 28 MG/DL (ref 6–20)
CALCIUM SERPL-MCNC: 8.1 MG/DL (ref 8.7–10.5)
CALCIUM SERPL-MCNC: 8.1 MG/DL (ref 8.7–10.5)
CHLORIDE SERPL-SCNC: 110 MMOL/L (ref 95–110)
CHLORIDE SERPL-SCNC: 110 MMOL/L (ref 95–110)
CO2 SERPL-SCNC: 22 MMOL/L (ref 23–29)
CO2 SERPL-SCNC: 22 MMOL/L (ref 23–29)
CODING SYSTEM: NORMAL
CODING SYSTEM: NORMAL
CREAT SERPL-MCNC: 3 MG/DL (ref 0.5–1.4)
CREAT SERPL-MCNC: 3 MG/DL (ref 0.5–1.4)
DIFFERENTIAL METHOD: ABNORMAL
DISPENSE STATUS: NORMAL
DISPENSE STATUS: NORMAL
EOSINOPHIL # BLD AUTO: 0.1 K/UL (ref 0–0.5)
EOSINOPHIL NFR BLD: 1.7 % (ref 0–8)
ERYTHROCYTE [DISTWIDTH] IN BLOOD BY AUTOMATED COUNT: 15.2 % (ref 11.5–14.5)
EST. GFR  (NO RACE VARIABLE): 28 ML/MIN/1.73 M^2
EST. GFR  (NO RACE VARIABLE): 28 ML/MIN/1.73 M^2
GLUCOSE SERPL-MCNC: 193 MG/DL (ref 70–110)
GLUCOSE SERPL-MCNC: 193 MG/DL (ref 70–110)
HCT VFR BLD AUTO: 23.1 % (ref 40–54)
HGB BLD-MCNC: 7.1 G/DL (ref 14–18)
IMM GRANULOCYTES # BLD AUTO: 0.02 K/UL (ref 0–0.04)
IMM GRANULOCYTES NFR BLD AUTO: 0.3 % (ref 0–0.5)
L PNEUMO AG UR QL IA: NEGATIVE
LYMPHOCYTES # BLD AUTO: 2.4 K/UL (ref 1–4.8)
LYMPHOCYTES NFR BLD: 32.5 % (ref 18–48)
MCH RBC QN AUTO: 23.8 PG (ref 27–31)
MCHC RBC AUTO-ENTMCNC: 30.7 G/DL (ref 32–36)
MCV RBC AUTO: 78 FL (ref 82–98)
MONOCYTES # BLD AUTO: 0.7 K/UL (ref 0.3–1)
MONOCYTES NFR BLD: 9.3 % (ref 4–15)
NEUTROPHILS # BLD AUTO: 4.1 K/UL (ref 1.8–7.7)
NEUTROPHILS NFR BLD: 55.8 % (ref 38–73)
NRBC BLD-RTO: 0 /100 WBC
PLATELET # BLD AUTO: 213 K/UL (ref 150–450)
PMV BLD AUTO: 10.4 FL (ref 9.2–12.9)
POCT GLUCOSE: 103 MG/DL (ref 70–110)
POCT GLUCOSE: 103 MG/DL (ref 70–110)
POCT GLUCOSE: 108 MG/DL (ref 70–110)
POCT GLUCOSE: 63 MG/DL (ref 70–110)
POTASSIUM SERPL-SCNC: 4.8 MMOL/L (ref 3.5–5.1)
POTASSIUM SERPL-SCNC: 4.8 MMOL/L (ref 3.5–5.1)
RBC # BLD AUTO: 2.98 M/UL (ref 4.6–6.2)
SODIUM SERPL-SCNC: 138 MMOL/L (ref 136–145)
SODIUM SERPL-SCNC: 138 MMOL/L (ref 136–145)
TRANS ERYTHROCYTES VOL PATIENT: NORMAL ML
TRANS ERYTHROCYTES VOL PATIENT: NORMAL ML
WBC # BLD AUTO: 7.24 K/UL (ref 3.9–12.7)

## 2022-12-08 PROCEDURE — 63600175 PHARM REV CODE 636 W HCPCS: Performed by: NURSE ANESTHETIST, CERTIFIED REGISTERED

## 2022-12-08 PROCEDURE — 25000003 PHARM REV CODE 250: Performed by: ORTHOPAEDIC SURGERY

## 2022-12-08 PROCEDURE — 36000710: Performed by: ORTHOPAEDIC SURGERY

## 2022-12-08 PROCEDURE — 37000008 HC ANESTHESIA 1ST 15 MINUTES: Performed by: ORTHOPAEDIC SURGERY

## 2022-12-08 PROCEDURE — 36415 COLL VENOUS BLD VENIPUNCTURE: CPT | Performed by: INTERNAL MEDICINE

## 2022-12-08 PROCEDURE — 25000003 PHARM REV CODE 250: Performed by: STUDENT IN AN ORGANIZED HEALTH CARE EDUCATION/TRAINING PROGRAM

## 2022-12-08 PROCEDURE — 85025 COMPLETE CBC W/AUTO DIFF WBC: CPT | Performed by: ANESTHESIOLOGY

## 2022-12-08 PROCEDURE — 21400001 HC TELEMETRY ROOM

## 2022-12-08 PROCEDURE — 86850 RBC ANTIBODY SCREEN: CPT | Performed by: ANESTHESIOLOGY

## 2022-12-08 PROCEDURE — P9021 RED BLOOD CELLS UNIT: HCPCS | Performed by: ANESTHESIOLOGY

## 2022-12-08 PROCEDURE — 87324 CLOSTRIDIUM AG IA: CPT | Performed by: INTERNAL MEDICINE

## 2022-12-08 PROCEDURE — 71000033 HC RECOVERY, INTIAL HOUR: Performed by: ORTHOPAEDIC SURGERY

## 2022-12-08 PROCEDURE — 86920 COMPATIBILITY TEST SPIN: CPT | Performed by: STUDENT IN AN ORGANIZED HEALTH CARE EDUCATION/TRAINING PROGRAM

## 2022-12-08 PROCEDURE — D9220A PRA ANESTHESIA: Mod: ANES,,, | Performed by: ANESTHESIOLOGY

## 2022-12-08 PROCEDURE — 80048 BASIC METABOLIC PNL TOTAL CA: CPT | Performed by: INTERNAL MEDICINE

## 2022-12-08 PROCEDURE — 36000711: Performed by: ORTHOPAEDIC SURGERY

## 2022-12-08 PROCEDURE — 37000009 HC ANESTHESIA EA ADD 15 MINS: Performed by: ORTHOPAEDIC SURGERY

## 2022-12-08 PROCEDURE — 86920 COMPATIBILITY TEST SPIN: CPT | Performed by: ANESTHESIOLOGY

## 2022-12-08 PROCEDURE — D9220A PRA ANESTHESIA: Mod: CRNA,,, | Performed by: NURSE ANESTHETIST, CERTIFIED REGISTERED

## 2022-12-08 PROCEDURE — 99900035 HC TECH TIME PER 15 MIN (STAT)

## 2022-12-08 PROCEDURE — 25000003 PHARM REV CODE 250: Performed by: INTERNAL MEDICINE

## 2022-12-08 PROCEDURE — D9220A PRA ANESTHESIA: ICD-10-PCS | Mod: ANES,,, | Performed by: ANESTHESIOLOGY

## 2022-12-08 PROCEDURE — 36430 TRANSFUSION BLD/BLD COMPNT: CPT

## 2022-12-08 PROCEDURE — 36415 COLL VENOUS BLD VENIPUNCTURE: CPT | Performed by: ANESTHESIOLOGY

## 2022-12-08 PROCEDURE — 63600175 PHARM REV CODE 636 W HCPCS: Performed by: ORTHOPAEDIC SURGERY

## 2022-12-08 PROCEDURE — 25000003 PHARM REV CODE 250: Performed by: NURSE ANESTHETIST, CERTIFIED REGISTERED

## 2022-12-08 PROCEDURE — 27201423 OPTIME MED/SURG SUP & DEVICES STERILE SUPPLY: Performed by: ORTHOPAEDIC SURGERY

## 2022-12-08 PROCEDURE — D9220A PRA ANESTHESIA: ICD-10-PCS | Mod: CRNA,,, | Performed by: NURSE ANESTHETIST, CERTIFIED REGISTERED

## 2022-12-08 PROCEDURE — 87040 BLOOD CULTURE FOR BACTERIA: CPT | Performed by: INTERNAL MEDICINE

## 2022-12-08 RX ORDER — HYDROCODONE BITARTRATE AND ACETAMINOPHEN 500; 5 MG/1; MG/1
TABLET ORAL
Status: DISCONTINUED | OUTPATIENT
Start: 2022-12-08 | End: 2022-12-12 | Stop reason: HOSPADM

## 2022-12-08 RX ORDER — HALOPERIDOL 5 MG/ML
0.5 INJECTION INTRAMUSCULAR EVERY 10 MIN PRN
Status: DISCONTINUED | OUTPATIENT
Start: 2022-12-08 | End: 2022-12-08

## 2022-12-08 RX ORDER — HYDROMORPHONE HYDROCHLORIDE 2 MG/ML
0.2 INJECTION, SOLUTION INTRAMUSCULAR; INTRAVENOUS; SUBCUTANEOUS EVERY 5 MIN PRN
Status: DISCONTINUED | OUTPATIENT
Start: 2022-12-08 | End: 2022-12-08

## 2022-12-08 RX ORDER — FENTANYL CITRATE 50 UG/ML
INJECTION, SOLUTION INTRAMUSCULAR; INTRAVENOUS
Status: DISCONTINUED | OUTPATIENT
Start: 2022-12-08 | End: 2022-12-08

## 2022-12-08 RX ORDER — CEFAZOLIN SODIUM 1 G/3ML
INJECTION, POWDER, FOR SOLUTION INTRAMUSCULAR; INTRAVENOUS
Status: DISCONTINUED | OUTPATIENT
Start: 2022-12-08 | End: 2022-12-08

## 2022-12-08 RX ORDER — SODIUM CHLORIDE 0.9 % (FLUSH) 0.9 %
10 SYRINGE (ML) INJECTION
Status: DISCONTINUED | OUTPATIENT
Start: 2022-12-08 | End: 2022-12-08

## 2022-12-08 RX ORDER — FENTANYL CITRATE 50 UG/ML
25 INJECTION, SOLUTION INTRAMUSCULAR; INTRAVENOUS EVERY 5 MIN PRN
Status: DISCONTINUED | OUTPATIENT
Start: 2022-12-08 | End: 2022-12-08

## 2022-12-08 RX ORDER — ACETAMINOPHEN 10 MG/ML
1000 INJECTION, SOLUTION INTRAVENOUS ONCE
Status: DISCONTINUED | OUTPATIENT
Start: 2022-12-08 | End: 2022-12-08

## 2022-12-08 RX ORDER — EPHEDRINE SULFATE 50 MG/ML
INJECTION, SOLUTION INTRAVENOUS
Status: DISCONTINUED | OUTPATIENT
Start: 2022-12-08 | End: 2022-12-08

## 2022-12-08 RX ORDER — LIDOCAINE HYDROCHLORIDE 20 MG/ML
INJECTION INTRAVENOUS
Status: DISCONTINUED | OUTPATIENT
Start: 2022-12-08 | End: 2022-12-08

## 2022-12-08 RX ORDER — PHENYLEPHRINE HYDROCHLORIDE 10 MG/ML
INJECTION INTRAVENOUS
Status: DISCONTINUED | OUTPATIENT
Start: 2022-12-08 | End: 2022-12-08

## 2022-12-08 RX ORDER — PROPOFOL 10 MG/ML
VIAL (ML) INTRAVENOUS
Status: DISCONTINUED | OUTPATIENT
Start: 2022-12-08 | End: 2022-12-08

## 2022-12-08 RX ORDER — MIDAZOLAM HYDROCHLORIDE 1 MG/ML
INJECTION, SOLUTION INTRAMUSCULAR; INTRAVENOUS
Status: DISCONTINUED | OUTPATIENT
Start: 2022-12-08 | End: 2022-12-08

## 2022-12-08 RX ORDER — SUCCINYLCHOLINE CHLORIDE 20 MG/ML
INJECTION INTRAMUSCULAR; INTRAVENOUS
Status: DISCONTINUED | OUTPATIENT
Start: 2022-12-08 | End: 2022-12-08

## 2022-12-08 RX ORDER — MORPHINE SULFATE 4 MG/ML
2 INJECTION, SOLUTION INTRAMUSCULAR; INTRAVENOUS
Status: DISCONTINUED | OUTPATIENT
Start: 2022-12-08 | End: 2022-12-12 | Stop reason: HOSPADM

## 2022-12-08 RX ADMIN — PHENYLEPHRINE HYDROCHLORIDE 200 MCG: 10 INJECTION INTRAVENOUS at 01:12

## 2022-12-08 RX ADMIN — SODIUM CHLORIDE: 0.9 INJECTION, SOLUTION INTRAVENOUS at 10:12

## 2022-12-08 RX ADMIN — LIDOCAINE HYDROCHLORIDE 100 MG: 20 INJECTION, SOLUTION INTRAVENOUS at 12:12

## 2022-12-08 RX ADMIN — FENTANYL CITRATE 50 MCG: 50 INJECTION, SOLUTION INTRAMUSCULAR; INTRAVENOUS at 12:12

## 2022-12-08 RX ADMIN — CEFAZOLIN SODIUM 2 G: 1 POWDER, FOR SOLUTION INTRAMUSCULAR; INTRAVENOUS at 12:12

## 2022-12-08 RX ADMIN — PROPOFOL 50 MG: 10 INJECTION, EMULSION INTRAVENOUS at 01:12

## 2022-12-08 RX ADMIN — MIDAZOLAM HYDROCHLORIDE 2 MG: 1 INJECTION, SOLUTION INTRAMUSCULAR; INTRAVENOUS at 12:12

## 2022-12-08 RX ADMIN — DEXTROSE 125 ML: 10 SOLUTION INTRAVENOUS at 10:12

## 2022-12-08 RX ADMIN — INSULIN DETEMIR 60 UNITS: 100 INJECTION, SOLUTION SUBCUTANEOUS at 10:12

## 2022-12-08 RX ADMIN — HYDROCODONE BITARTRATE AND ACETAMINOPHEN 1 TABLET: 10; 325 TABLET ORAL at 10:12

## 2022-12-08 RX ADMIN — SUCCINYLCHOLINE CHLORIDE 140 MG: 20 INJECTION, SOLUTION INTRAMUSCULAR; INTRAVENOUS at 12:12

## 2022-12-08 RX ADMIN — SODIUM CHLORIDE: 0.9 INJECTION, SOLUTION INTRAVENOUS at 08:12

## 2022-12-08 RX ADMIN — PROPOFOL 150 MG: 10 INJECTION, EMULSION INTRAVENOUS at 12:12

## 2022-12-08 RX ADMIN — EPHEDRINE SULFATE 15 MG: 50 INJECTION INTRAVENOUS at 01:12

## 2022-12-08 RX ADMIN — CEFTRIAXONE SODIUM 2 G: 2 INJECTION, SOLUTION INTRAVENOUS at 10:12

## 2022-12-08 RX ADMIN — SODIUM CHLORIDE, SODIUM LACTATE, POTASSIUM CHLORIDE, AND CALCIUM CHLORIDE: .6; .31; .03; .02 INJECTION, SOLUTION INTRAVENOUS at 12:12

## 2022-12-08 RX ADMIN — PHENYLEPHRINE HYDROCHLORIDE 100 MCG: 10 INJECTION INTRAVENOUS at 01:12

## 2022-12-08 RX ADMIN — Medication 16 G: at 11:12

## 2022-12-08 NOTE — NURSING
Called to patient's room patient stated she didn't feel good.   Patient diaphoretic, and shaking.   BS 63.  PRN dextrose infusing. Will continue to monitor.    Pt requesting to receive the glucose tablets. Explained to patient that she is NPO for surgery, so we will try the IV dextrose first, and see how she does.  Pt frustrated mumbling under her breath.

## 2022-12-08 NOTE — OP NOTE
[facility]  Surgery Department  Operative Note    SUMMARY     Date of Procedure: 12/8/2022     Procedure: Procedure(s) (LRB):  AMPUTATION, BELOW KNEE I&D /REVISION SAW/PULSA VAC (Right)     Surgeon(s) and Role:     * Henry Tolliver MD - Primary    Assisting Surgeon: Ash    Pre-Operative Diagnosis: Open wound of right lower leg, subsequent encounter [S81.786D] Osteomyelitis rt tibia, Rt BKA wound dehiscence    Post-Operative Diagnosis: Post-Op Diagnosis Codes:     * Open wound of right lower leg, subsequent encounter [S81.801D]    Anesthesia: Choice    Technical Procedures Used: Revison BKA/ I&D    Description of the Findings of the Procedure: exposed bone    Significant Surgical Tasks Conducted by the Assistant(s), if Applicable:  Position, prep, drape, retract, assist with instrumentation, assist with wound closure, dressing    Complications: No    Estimated Blood Loss (EBL): 30ml    Implants: * No implants in log *    Specimens:  Residual right tibia and fibula  Specimen (24h ago, onward)      None                    Condition: Good    Disposition: PACU - hemodynamically stable.    Attestation: I performed the procedure.    Procedure in detail:    After proper consents were obtained, patient was taken to the operating room and administered general anesthesia.  Right lower extremity was then prepped and draped in normal sterile fashion.  Wound was again inspected and was noted that the distal tibia was ruptured through the open portion of the incision.  There was a separate skin erosion with exposed fibula.  Skin edges for both wounds were then sharply excised.  Extensive pseudo around was present within the wound and this was sharply excised as well.  Subperiosteal dissection was then performed on the tibia about 3-4 cm from the distal extent.  Retractors were placed and Key elevator was placed to protect the neurovascular structures.  Osteotomy was then made with the oscillating saw and the distal  portion of the residual tibia was removed.  This was then performed on the fibula.  Further dissection was then performed on the lateral side of the wound and the distal portion of the residual fibula was exposed.  About 2 cm proximal to the tibial resection osteotomy was made through the fibula and the distal portion of the fibula was removed.  Pseudo around around the tibia and fibula were then debulked.  Hemostasis was then achieved.  3 L normal saline were then irrigated through the wound with the Pulsavac.  No purulence was encountered throughout this procedure.  Once debridement and irrigation were completed closure was performed.  Deep layer was closed to the anterior periosteum with 0 Vicryl.  Subcutaneous tissues closed with 2-0 Vicryl.  Skin incision was closed with staples.  The ulceration of the fibula was then closed with 2-0 Vicryl and 2-0 nylon.  Sterile dressings were then applied.  Tourniquet deflated.  Patient was aroused in the operating room and transferred to recovery in stable condition

## 2022-12-08 NOTE — SUBJECTIVE & OBJECTIVE
Interval History: No new issues     Review of Systems   Constitutional:  Negative for activity change, appetite change and chills.   HENT:  Negative for congestion and dental problem.    Eyes:  Negative for discharge and itching.   Respiratory:  Negative for apnea and chest tightness.    Cardiovascular:  Negative for chest pain.   Gastrointestinal:  Negative for abdominal distention and abdominal pain.   Genitourinary:  Negative for difficulty urinating and dyspareunia.   Neurological:  Negative for dizziness and facial asymmetry.   Hematological:  Negative for adenopathy.   Psychiatric/Behavioral:  Negative for agitation.    Objective:     Vital Signs (Most Recent):  Temp: 97.7 °F (36.5 °C) (12/08/22 0700)  Pulse: 79 (12/08/22 0700)  Resp: 18 (12/08/22 0700)  BP: 132/87 (12/08/22 0700)  SpO2: 100 % (12/08/22 0700) Vital Signs (24h Range):  Temp:  [97.5 °F (36.4 °C)-97.7 °F (36.5 °C)] 97.7 °F (36.5 °C)  Pulse:  [78-90] 79  Resp:  [18] 18  SpO2:  [100 %] 100 %  BP: (100-132)/(62-87) 132/87     Weight: 97.9 kg (215 lb 13.3 oz)  Body mass index is 29.27 kg/m².    Intake/Output Summary (Last 24 hours) at 12/8/2022 1022  Last data filed at 12/8/2022 0800  Gross per 24 hour   Intake 640 ml   Output 400 ml   Net 240 ml      Physical Exam  Vitals and nursing note reviewed.   Constitutional:       General: She is not in acute distress.     Appearance: Normal appearance. She is not ill-appearing, toxic-appearing or diaphoretic.   HENT:      Head: Normocephalic and atraumatic.      Nose: Nose normal.   Eyes:      Conjunctiva/sclera: Conjunctivae normal.   Cardiovascular:      Rate and Rhythm: Normal rate and regular rhythm.   Pulmonary:      Effort: No respiratory distress.      Breath sounds: No stridor.   Abdominal:      General: There is no distension.   Skin:     General: Skin is warm and dry.   Neurological:      Mental Status: She is alert and oriented to person, place, and time.   Psychiatric:         Mood and Affect:  Mood normal.         Behavior: Behavior normal.         Thought Content: Thought content normal.       Significant Labs: All pertinent labs within the past 24 hours have been reviewed.  BMP:   Recent Labs   Lab 12/08/22  0430   *  193*     138   K 4.8  4.8     110   CO2 22*  22*   BUN 28*  28*   CREATININE 3.0*  3.0*   CALCIUM 8.1*  8.1*     CBC:   Recent Labs   Lab 12/08/22  0430   WBC 7.24   HGB 7.1*   HCT 23.1*          Significant Imaging:

## 2022-12-08 NOTE — PROGRESS NOTES
VANCOMYCIN DOSING BY PHARMACY DISCONTINUATION NOTE    Pratik Torres is a 27 y.o. adult who had been consulted for vancomycin dosing.    The pharmacy consult for vancomycin dosing has been discontinued.     Vancomycin Dosing by Pharmacy Consult will sign-off. Please reconsult if necessary. Thank you for allowing us to participate in this patient's care.       Germaine Shah, PharmD  [phone]

## 2022-12-08 NOTE — TRANSFER OF CARE
Anesthesia Transfer of Care Note    Patient: Pratik Torres    Procedure(s) Performed: Procedure(s) (LRB):  AMPUTATION, BELOW KNEE I&D /REVISION SAW/PULSA VAC (Right)    Patient location: PACU    Anesthesia Type: general    Transport from OR: Transported from OR on room air with adequate spontaneous ventilation    Post pain: adequate analgesia    Post assessment: no apparent anesthetic complications and tolerated procedure well    Post vital signs: stable    Level of consciousness: sedated    Nausea/Vomiting: no nausea/vomiting    Complications: none    Transfer of care protocol was followed      Last vitals:   Visit Vitals  BP (!) 113/59 (BP Location: Right arm, Patient Position: Lying)   Pulse 88   Temp 36 °C (96.8 °F) (Skin)   Resp 17   Ht 6' (1.829 m)   Wt 97.9 kg (215 lb 13.3 oz)   SpO2 100%   BMI 29.27 kg/m²

## 2022-12-08 NOTE — ASSESSMENT & PLAN NOTE
Strep from cultures on 12/5. Not sure if real.  Repeated cultures today- 12/6.  ID consulted     See above- sepsis

## 2022-12-08 NOTE — ANESTHESIA PROCEDURE NOTES
Intubation    Date/Time: 12/8/2022 12:54 PM  Performed by: Collins Acosta CRNA  Authorized by: Felicia Recio MD     Intubation:     Induction:  Intravenous    Intubated:  Postinduction    Mask Ventilation:  Easy mask    Attempts:  1    Attempted By:  CRNA    Method of Intubation:  Video laryngoscopy    Blade:  Franco 3    Laryngeal View Grade: Grade I - full view of cords      Difficult Airway Encountered?: No

## 2022-12-08 NOTE — NURSING
Patient returned to unit with recovery nurse. Patient with PRBC's, and LR infusing. Patient able to scoot from stretcher to bed, and dressing CDI.  PCT attempted to get vitals, and patient refused. Patient instructed to call whenever she is ready for her tray.    1534: Charge nurse notified that patient is refusing vitals post surgery, and arrival to floor. Will attempt to try when PRBC's is complete.

## 2022-12-08 NOTE — PROGRESS NOTES
"[facility] Medicine  Progress Note    Patient Name: Pratik Torres  MRN: [id number]  Patient Class: IP- Inpatient   Admission Date: 12/5/2022  Length of Stay: 3 days  Attending Physician: Delfin Sanchez MD  Primary Care Provider: Felipe Summers MD        Subjective:     Principal Problem:Sepsis        HPI:  This is a 27 year old transgender female with a PMHx of T1DM c/b RLE cellulitis s/p right BKA, iron deficiency anemia, IBS-D who presents with fevers.     Patient reports developing fevers, chills, multiple episodes of non bloody emesis and decreased po intake 2 days prior to presentation. Associated symptoms include cough, and SOB that started a day prior. The patient has a chronic right stump wound and reports that he has "a bone infection" that she's following with wound care for. In the ED, he was febrile (39.5), tachycardic (140), tachypnic. Labs showed no leukocytosis (8.7), microcytic anemia (5.9), hyponatremia (128), elevated creatinine (2.1 baseline of 1.2), negative lactic acid (1.0), elevated procalcitonin (25.0), negative troponin (0.017). CT CAP showed multifocal patchy opacities seen within the lower lobes, left greater than right (aspiration versus infectious process). Xray right tib/fib showed osteomyelitis at the distal aspect of the tibia and fibula amputation site. The patient was given fluids, vancomycin, zosyn and was admitted for further management.       Overview/Hospital Course:  Patient admitted for osteo of R stump wound. ID consulted.  Patient had + blood cultures. Ortho consulted for I and D and stump revision planned for 12/8. Patient grew out strep G on blood cultures. ID started on Ceftriaxone.  Blood cultures repeated on 12/8. Echo without vegetations. Source likely dental.      Interval History: No new issues     Review of Systems   Constitutional:  Negative for activity change, appetite change and chills.   HENT:  Negative for congestion and dental " problem.    Eyes:  Negative for discharge and itching.   Respiratory:  Negative for apnea and chest tightness.    Cardiovascular:  Negative for chest pain.   Gastrointestinal:  Negative for abdominal distention and abdominal pain.   Genitourinary:  Negative for difficulty urinating and dyspareunia.   Neurological:  Negative for dizziness and facial asymmetry.   Hematological:  Negative for adenopathy.   Psychiatric/Behavioral:  Negative for agitation.    Objective:     Vital Signs (Most Recent):  Temp: 97.7 °F (36.5 °C) (12/08/22 0700)  Pulse: 79 (12/08/22 0700)  Resp: 18 (12/08/22 0700)  BP: 132/87 (12/08/22 0700)  SpO2: 100 % (12/08/22 0700) Vital Signs (24h Range):  Temp:  [97.5 °F (36.4 °C)-97.7 °F (36.5 °C)] 97.7 °F (36.5 °C)  Pulse:  [78-90] 79  Resp:  [18] 18  SpO2:  [100 %] 100 %  BP: (100-132)/(62-87) 132/87     Weight: 97.9 kg (215 lb 13.3 oz)  Body mass index is 29.27 kg/m².    Intake/Output Summary (Last 24 hours) at 12/8/2022 1022  Last data filed at 12/8/2022 0800  Gross per 24 hour   Intake 640 ml   Output 400 ml   Net 240 ml      Physical Exam  Vitals and nursing note reviewed.   Constitutional:       General: She is not in acute distress.     Appearance: Normal appearance. She is not ill-appearing, toxic-appearing or diaphoretic.   HENT:      Head: Normocephalic and atraumatic.      Nose: Nose normal.   Eyes:      Conjunctiva/sclera: Conjunctivae normal.   Cardiovascular:      Rate and Rhythm: Normal rate and regular rhythm.   Pulmonary:      Effort: No respiratory distress.      Breath sounds: No stridor.   Abdominal:      General: There is no distension.   Skin:     General: Skin is warm and dry.   Neurological:      Mental Status: She is alert and oriented to person, place, and time.   Psychiatric:         Mood and Affect: Mood normal.         Behavior: Behavior normal.         Thought Content: Thought content normal.       Significant Labs: All pertinent labs within the past 24 hours have been  reviewed.  BMP:   Recent Labs   Lab 12/08/22  0430   *  193*     138   K 4.8  4.8     110   CO2 22*  22*   BUN 28*  28*   CREATININE 3.0*  3.0*   CALCIUM 8.1*  8.1*     CBC:   Recent Labs   Lab 12/08/22  0430   WBC 7.24   HGB 7.1*   HCT 23.1*          Significant Imaging:       Assessment/Plan:      * Sepsis  This patient does have evidence of infective focus  My overall impression is sepsis. Vital signs were reviewed and noted in progress note.  Antibiotics given-   Antibiotics (From admission, onward)    Start     Stop Route Frequency Ordered    12/07/22 2300  cefTRIAXone 2 gram/50 mL IVPB 2 g         -- IV Every 24 hours (non-standard times) 12/07/22 2155        Cultures were taken-   Microbiology Results (last 7 days)     Procedure Component Value Units Date/Time    Blood culture [[id number]]     Order Status: Sent Specimen: Blood     Blood culture [[id number]]     Order Status: Sent Specimen: Blood     Blood culture x two cultures. Draw prior to antibiotics. [[id number]]  (Abnormal) Collected: 12/05/22 1646    Order Status: Completed Specimen: Blood from Peripheral, Antecubital, Left Updated: 12/08/22 0856     Blood Culture, Routine Gram stain aer bottle: Gram positive cocci in chains resembling Strep      Gram stain leonard bottle: Gram positive cocci in chains resembling Strep      Positive results previously called      STREPTOCOCCUS GROUP G  Beta-hemolytic streptococci are routinely susceptible to   penicillins,cephalosporins and carbapenems.  For susceptibility see order # [id number]      Narrative:      Aerobic and anaerobic    Blood culture x two cultures. Draw prior to antibiotics. [[id number]]  (Abnormal)  (Susceptibility) Collected: 12/05/22 1654    Order Status: Completed Specimen: Blood from Peripheral, Forearm, Left Updated: 12/08/22 0855     Blood Culture, Routine Gram stain leonard bottle: Gram positive cocci in chains resembling Strep      Results called to and read back  by:Sindi ALATORRE at 0243 aw      Gram stain aer bottle: Gram positive cocci in chains resembling Strep      Positive results previously called      GROUP G STREPTOCOCCUS  Beta-hemolytic streptococci are routinely susceptible to   penicillins,cephalosporins and carbapenems.  Beta-hemolytic streptococci are routinely susceptible to   penicillins,cephalosporins and carbapenems.      Narrative:      Aerobic and anaerobic    Blood culture [[id number]] Collected: 12/06/22 0954    Order Status: Completed Specimen: Blood from Peripheral, Right Hand Updated: 12/08/22 0842     Blood Culture, Routine Gram stain aer bottle: Gram positive cocci in chains resembling Strep      Results called to and read back by: Tahmina FLOWER 12/08/2022      08:40    Clostridium difficile EIA [[id number]] Collected: 12/08/22 0100    Order Status: Sent Specimen: Stool Updated: 12/08/22 0157    Blood culture [[id number]] Collected: 12/06/22 0954    Order Status: Completed Specimen: Blood from Antecubital, Right Updated: 12/07/22 1103     Blood Culture, Routine No Growth to date      No Growth to date    Clostridium difficile EIA [[id number]] Collected: 12/06/22 2256    Order Status: Canceled Specimen: Stool     Clostridium difficile EIA [[id number]]     Order Status: Canceled Specimen: Stool     Culture, Respiratory with Gram Stain [[id number]]     Order Status: No result Specimen: Respiratory from Sputum         Latest lactate reviewed, they are-  Recent Labs   Lab 12/05/22  1646 12/06/22  0020   LACTATE 1.0 0.7       Organ dysfunction indicated by Acute kidney injury  Source- Skin/soft tissue versus respiratory     Source control Achieved by-    Strep G bacteremia from cultures on 12/6. Repeat sent 12/8.   Ceftriaxone for 4 weeks followed by Levaquin for 2.    Echo negative for vegetations.      Continue vancomycin/zosyn for now   F/U blood cultures and de-escalate appropriately   Infectious diease consult   Podiatry consult     Sepsis from  osteo of R lower leg/Possible pneumonia and possible bacteremia. Continue Abx. ID consult  Cefepime and vanc.  Ortho consulted for I and D and stump revision      Source- likely dental      Bacteremia  Strep from cultures on 12/5. Not sure if real.  Repeated cultures today- 12/6.  ID consulted     See above- sepsis       Transgender woman on hormone therapy  Resume estrace     Osteomyelitis of tibia  Presented with fevers   Xray right tib/fib showed osteomyelitis at the distal aspect of the tibia and fibula amputation site    Plan:   - Vancomycin/zosyn as patient presented septic w/ soft BP  - Podiatry/ID consult   - F/U cultures  - NPO at [facility]     Pneumonia  Presented with fevers, SOB, and cough   CT CAP showed multifocal patchy opacities seen within the lower lobes, left greater than right (aspiration versus infectious process)    Plan:   - Vanc/Zosyn for now   - ID consult   - Respiratory cultures  - Legionella urine antigen   - Monitor respiratory symptoms     THEE (acute kidney injury)  Patient with acute kidney injury likely due to pre-renal azotemia THEE is currently stable. Labs reviewed- Renal function/electrolytes with Estimated Creatinine Clearance (based on SCr of 2.5 mg/dL (H))  Female: 44.3 mL/min (A)  Male: 53.8 mL/min (A)  Hover for info according to latest data. Monitor urine output and serial BMP and adjust therapy as needed. Avoid nephrotoxins and renally dose meds for GFR listed above.     IVF   Monitor BMPs    Microcytic anemia  Unclear etiology   No signs/symptoms of bleeding, patient reports having prior similar episodes where transfusions were needed without any signs of bleeding   Transfuse to keep Hb > 7   Outpatient follow up with GI     Diarrhea  Likely related to IBS-D  R/O C.diff     Type 1 diabetes mellitus with hyperglycemia  Patient's FSGs are controlled on current medication regimen.  Last A1c reviewed-   Lab Results   Component Value Date    HGBA1C 12.2 (H) 06/05/2020     Most recent  fingerstick glucose reviewed-   Recent Labs   Lab 12/05/22  1806   POCTGLUCOSE 78     Current correctional scale  Low  Maintain anti-hyperglycemic dose as follows-   Antihyperglycemics (From admission, onward)    Start     Stop Route Frequency Ordered    12/06/22 0715  insulin aspart U-100 pen 10 Units         -- SubQ 3 times daily with meals 12/05/22 2221 12/05/22 2330  insulin detemir U-100 pen 60 Units         -- SubQ 2 times daily 12/05/22 2215        Hold Oral hypoglycemics while patient is in the hospital.    Acute pancreatitis without infection or necrosis          VTE Risk Mitigation (From admission, onward)         Ordered     heparin (porcine) injection 5,000 Units  Every 8 hours         12/05/22 2215     IP VTE HIGH RISK PATIENT  Once         12/05/22 2215     Place sequential compression device  Until discontinued         12/05/22 2215                Discharge Planning   HUSSEIN:      Code Status: Full Code   Is the patient medically ready for discharge?:     Reason for patient still in hospital (select all that apply): Patient unstable  Discharge Plan A: Home                  Delfin Pierre MD  Department of Hospital Medicine   [facility] [place] - Telemetry

## 2022-12-08 NOTE — PLAN OF CARE
Patient went for I&D and revision of R stump. Patient returned to room with no complaints of pain. Patient appears to be resting comfortably asleep in bed. This nurse was able to get patient's vitals, and blood sugar. Dressing clean, dry, and intact.   Problem: Skin Injury Risk Increased  Goal: Skin Health and Integrity  Outcome: Ongoing, Progressing  Intervention: Optimize Skin Protection  Flowsheets (Taken 12/8/2022 1711)  Pressure Reduction Techniques: frequent weight shift encouraged  Pressure Reduction Devices: positioning supports utilized  Skin Protection:   adhesive use limited   drying agents applied   incontinence pads utilized  Head of Bed (HOB) Positioning: HOB elevated

## 2022-12-08 NOTE — NURSING
"Went into patient's room to give morning medication patient was very confrontational stating, "why do ya'll keep coming in my room? Ya'll keep waking me up. Ya'll are getting on fucking nerves. This hospital is so stupid. Who keeps waking me up to take my vitals."  Educated patient on who PCT was, and her role. How often she would be to take vitals. Patient still very agitated, and upset. Patient pulled covers over head.   "

## 2022-12-08 NOTE — PLAN OF CARE
Problem: Adult Inpatient Plan of Care  Goal: Optimal Comfort and Wellbeing  Intervention: Monitor Pain and Promote Comfort  Flowsheets (Taken 12/8/2022 0522)  Pain Management Interventions:   care clustered   medication offered but refused   pillow support provided     Problem: Glycemic Control Impaired (Sepsis/Septic Shock)  Goal: Blood Glucose Level Within Desired Range  Intervention: Optimize Glycemic Control  Flowsheets (Taken 12/8/2022 0522)  Glycemic Management:   blood glucose monitored   supplemental insulin given

## 2022-12-08 NOTE — ASSESSMENT & PLAN NOTE
This patient does have evidence of infective focus  My overall impression is sepsis. Vital signs were reviewed and noted in progress note.  Antibiotics given-   Antibiotics (From admission, onward)    Start     Stop Route Frequency Ordered    12/07/22 2300  cefTRIAXone 2 gram/50 mL IVPB 2 g         -- IV Every 24 hours (non-standard times) 12/07/22 2155        Cultures were taken-   Microbiology Results (last 7 days)     Procedure Component Value Units Date/Time    Blood culture [[id number]]     Order Status: Sent Specimen: Blood     Blood culture [[id number]]     Order Status: Sent Specimen: Blood     Blood culture x two cultures. Draw prior to antibiotics. [[id number]]  (Abnormal) Collected: 12/05/22 1646    Order Status: Completed Specimen: Blood from Peripheral, Antecubital, Left Updated: 12/08/22 0856     Blood Culture, Routine Gram stain aer bottle: Gram positive cocci in chains resembling Strep      Gram stain leonard bottle: Gram positive cocci in chains resembling Strep      Positive results previously called      STREPTOCOCCUS GROUP G  Beta-hemolytic streptococci are routinely susceptible to   penicillins,cephalosporins and carbapenems.  For susceptibility see order # [id number]      Narrative:      Aerobic and anaerobic    Blood culture x two cultures. Draw prior to antibiotics. [[id number]]  (Abnormal)  (Susceptibility) Collected: 12/05/22 1654    Order Status: Completed Specimen: Blood from Peripheral, Forearm, Left Updated: 12/08/22 0855     Blood Culture, Routine Gram stain leonard bottle: Gram positive cocci in chains resembling Strep      Results called to and read back by:Sindi ALATORRE at 0243 aw      Gram stain aer bottle: Gram positive cocci in chains resembling Strep      Positive results previously called      GROUP G STREPTOCOCCUS  Beta-hemolytic streptococci are routinely susceptible to   penicillins,cephalosporins and carbapenems.  Beta-hemolytic streptococci are routinely susceptible to    penicillins,cephalosporins and carbapenems.      Narrative:      Aerobic and anaerobic    Blood culture [[id number]] Collected: 12/06/22 0954    Order Status: Completed Specimen: Blood from Peripheral, Right Hand Updated: 12/08/22 0842     Blood Culture, Routine Gram stain aer bottle: Gram positive cocci in chains resembling Strep      Results called to and read back by: Tahmina FLOWER 12/08/2022      08:40    Clostridium difficile EIA [[id number]] Collected: 12/08/22 0100    Order Status: Sent Specimen: Stool Updated: 12/08/22 0157    Blood culture [[id number]] Collected: 12/06/22 0954    Order Status: Completed Specimen: Blood from Antecubital, Right Updated: 12/07/22 1103     Blood Culture, Routine No Growth to date      No Growth to date    Clostridium difficile EIA [[id number]] Collected: 12/06/22 2256    Order Status: Canceled Specimen: Stool     Clostridium difficile EIA [[id number]]     Order Status: Canceled Specimen: Stool     Culture, Respiratory with Gram Stain [[id number]]     Order Status: No result Specimen: Respiratory from Sputum         Latest lactate reviewed, they are-  Recent Labs   Lab 12/05/22  1646 12/06/22  0020   LACTATE 1.0 0.7       Organ dysfunction indicated by Acute kidney injury  Source- Skin/soft tissue versus respiratory     Source control Achieved by-    Strep G bacteremia from cultures on 12/6. Repeat sent 12/8.   Ceftriaxone for 4 weeks followed by Levaquin for 2.    Echo negative for vegetations.      Continue vancomycin/zosyn for now   F/U blood cultures and de-escalate appropriately   Infectious diease consult   Podiatry consult     Sepsis from osteo of R lower leg/Possible pneumonia and possible bacteremia. Continue Abx. ID consult  Cefepime and vanc.  Ortho consulted for I and D and stump revision      Source- likely dental

## 2022-12-08 NOTE — NURSING
Pt refusing vitals to be taken. Pt also refusing blood sugar checked. Pt educated on importance of having blood sugar checked after having a low reading. Pt continues to refuse.   Ortho at bedside getting consent now.     Will continue to monitor.

## 2022-12-08 NOTE — ANESTHESIA PREPROCEDURE EVALUATION
12/08/2022  Pratik Torres is a 27 y.o., adult.      Pre-op Assessment    I have reviewed the Patient Summary Reports.     I have reviewed the Nursing Notes. I have reviewed the NPO Status.   I have reviewed the Medications.     Review of Systems  Anesthesia Hx:  No problems with previous Anesthesia  History of prior surgery of interest to airway management or planning: Denies Family Hx of Anesthesia complications.   Denies Personal Hx of Anesthesia complications.   Social:  Non-Smoker HX drug use   Hematology/Oncology:         -- Anemia: Hematology Comments: Unclear etiology for anemia; has received 2u PRBCs this admission; no reactions    Bacteremia->dental caries as possible source per ID note   Cardiovascular:   Exercise tolerance: good Denies Hypertension.  Denies MI.    Denies Angina. 12/7/2022 Echo: EF 55%; PAP 34; no evidence of endocarditis   Pulmonary:   Pneumonia Bilateral lobes opacities; denies any respiratory symptoms   Renal/:   Chronic Renal Disease, ARF    Hepatic/GI:   IBS   Musculoskeletal:   Open wound of right BKA   Neurological:  Neurology Normal    Endocrine:   Diabetes, type 2        Physical Exam  General: Well nourished, Cooperative, Alert and Oriented    Airway:  Mallampati: II   Mouth Opening: Normal  TM Distance: > 6 cm  Tongue: Normal  Neck ROM: Normal ROM    Dental:Very poor dentition; many missing and partially broken    Wt Readings from Last 3 Encounters:   12/06/22 97.9 kg (215 lb 13.3 oz)   06/10/20 83 kg (182 lb 15.7 oz)   06/08/20 83.9 kg (185 lb)     Temp Readings from Last 3 Encounters:   12/07/22 36.4 °C (97.5 °F) (Oral)   11/13/20 36.8 °C (98.3 °F) (Oral)   06/14/20 36.9 °C (98.4 °F) (Oral)     BP Readings from Last 3 Encounters:   12/07/22 100/62   11/13/20 (!) 140/70   06/14/20 (!) 104/59     Pulse Readings from Last 3 Encounters:   12/07/22 90   11/13/20 90    06/14/20 95     Lab Results   Component Value Date    WBC 10.43 12/06/2022    HGB 8.2 (L) 12/06/2022    HCT 25.9 (L) 12/06/2022    MCV 76 (L) 12/06/2022     12/06/2022       CMP  Sodium   Date Value Ref Range Status   12/07/2022 132 (L) 136 - 145 mmol/L Final     Potassium   Date Value Ref Range Status   12/07/2022 4.2 3.5 - 5.1 mmol/L Final     Chloride   Date Value Ref Range Status   12/07/2022 104 95 - 110 mmol/L Final     CO2   Date Value Ref Range Status   12/07/2022 23 23 - 29 mmol/L Final     Glucose   Date Value Ref Range Status   12/07/2022 97 70 - 110 mg/dL Final     BUN   Date Value Ref Range Status   12/07/2022 28 (H) 6 - 20 mg/dL Final     Creatinine   Date Value Ref Range Status   12/07/2022 2.9 (H) 0.5 - 1.4 mg/dL Final     Calcium   Date Value Ref Range Status   12/07/2022 8.2 (L) 8.7 - 10.5 mg/dL Final     Total Protein   Date Value Ref Range Status   12/05/2022 8.9 (H) 6.0 - 8.4 g/dL Final     Albumin   Date Value Ref Range Status   12/05/2022 1.6 (L) 3.5 - 5.2 g/dL Final     Total Bilirubin   Date Value Ref Range Status   12/05/2022 0.8 0.1 - 1.0 mg/dL Final     Comment:     For infants and newborns, interpretation of results should be based  on gestational age, weight and in agreement with clinical  observations.    Premature Infant recommended reference ranges:  Up to 24 hours.............<8.0 mg/dL  Up to 48 hours............<12.0 mg/dL  3-5 days..................<15.0 mg/dL  6-29 days.................<15.0 mg/dL       Alkaline Phosphatase   Date Value Ref Range Status   12/05/2022 116 55 - 135 U/L Final     AST   Date Value Ref Range Status   12/05/2022 26 10 - 40 U/L Final     ALT   Date Value Ref Range Status   12/05/2022 9 (L) 10 - 44 U/L Final     Anion Gap   Date Value Ref Range Status   12/07/2022 5 (L) 8 - 16 mmol/L Final     eGFR   Date Value Ref Range Status   12/07/2022 29 (A) >60 mL/min/1.73 m^2 Final     12/6/2022 COVID negative    Anesthesia Plan  Type of Anesthesia,  risks & benefits discussed:    Anesthesia Type: Gen ETT, Gen Supraglottic Airway  Intra-op Monitoring Plan: Standard ASA Monitors  Post Op Pain Control Plan: multimodal analgesia and IV/PO Opioids PRN  Induction:  IV  Airway Plan: Video, Post-Induction  Informed Consent: Informed consent signed with the Patient and all parties understand the risks and agree with anesthesia plan.  All questions answered. Patient consented to blood products? Yes  ASA Score: 3  Anesthesia Plan Notes: Paper consents placed in chart.     Ready For Surgery From Anesthesia Perspective.     .

## 2022-12-08 NOTE — ASSESSMENT & PLAN NOTE
"27 transgender female with h/o T1DM, R BKA, IBS, dental caries admitted 12/5 with fatigue/fall. Reports chronic GI symptoms and stump wound. Notably reports h/o strep bacteremia ~2020. CT L>R opacity. Bcx 12/5 with both sets positive for group G strep. Repeat 12/6 NGTD. 2d echo negative for vegetations.ID consulted for "osteomyelitis"    Source unclear- dental caries? Endocarditis? Osteomyelitis? Pneumonia? Stump wound seems chronic and no active drainage or deep tunneling or exposed bone. Given that his teeth are necrotic and he reports they are breaking apart, high suspicion that could be source. No respiratory symptoms, so pneumonia seems less likely    Recommendations:   - de-escalate to ceftriaxone  - would consult SW to help get micro records form [facility] (pt reports strep bacteremia ~2020)  - needs outpatient dental f/u to get teeth pulled.   - consider bone biopsy at stump for path/culture at time of wound closure.   - tentatively planning on 4 weeks iv ceftriaxone followed by 2 weeks levaquin [for treatment of bacteremia, can not r/o IE without KEL and can not r/o OM without bone biopsy]      "

## 2022-12-08 NOTE — NURSING
Received report from PACU nurse, pt vitals WNL. Patient's PRBC have been infusing for 25-30 minutes now. Patient with no complaints of pain at this time.  Awaiting patients arrival.

## 2022-12-08 NOTE — ANESTHESIA POSTPROCEDURE EVALUATION
Anesthesia Post Evaluation    Patient: Pratik Torres    Procedure(s) Performed: Procedure(s) (LRB):  AMPUTATION, BELOW KNEE I&D /REVISION SAW/PULSA VAC (Right)    Final Anesthesia Type: general      Patient location during evaluation: PACU  Patient participation: Yes- Able to Participate  Level of consciousness: awake and alert  Post-procedure vital signs: reviewed and stable  Pain management: adequate  Airway patency: patent  BERNABE mitigation strategies: Multimodal analgesia and Extubation while patient is awake  PONV status at discharge: No PONV  Anesthetic complications: no      Cardiovascular status: blood pressure returned to baseline  Respiratory status: unassisted and spontaneous ventilation  Hydration status: euvolemic  Follow-up not needed.          Vitals Value Taken Time   /66 12/08/22 1430   Temp 36.1 °C (97 °F) 12/08/22 1430   Pulse 88 12/08/22 1430   Resp 18 12/08/22 1430   SpO2 100 % 12/08/22 1430         No case tracking events are documented in the log.      Pain/Linda Score: Pain Rating Prior to Med Admin: 10 (12/7/2022  4:14 PM)  Pain Rating Post Med Admin: 0 (12/7/2022  4:44 PM)

## 2022-12-08 NOTE — CONSULTS
"[facility] [place]  Infectious Disease  Consult Note    Patient Name: Pratik Torres  MRN: [id number]  Admission Date: 12/5/2022  Hospital Length of Stay: 2 days  Attending Physician: Delfin Sanchez MD  Primary Care Provider: Felipe Summers MD     Isolation Status: Special Contact    Patient information was obtained from patient and ER records.      Consults  Assessment/Plan:     Bacterial infection due to Streptococcus, group G  27 transgender female with h/o T1DM, R BKA, IBS, dental caries admitted 12/5 with fatigue/fall. Reports chronic GI symptoms and stump wound. Notably reports h/o strep bacteremia ~2020. CT L>R opacity. Bcx 12/5 with both sets positive for group G strep. Repeat 12/6 NGTD. 2d echo negative for vegetations.ID consulted for "osteomyelitis"    Source unclear- dental caries? Endocarditis? Osteomyelitis? Pneumonia? Stump wound seems chronic and no active drainage or deep tunneling or exposed bone. Given that his teeth are necrotic and he reports they are breaking apart, high suspicion that could be source. No respiratory symptoms, so pneumonia seems less likely    Recommendations:   - de-escalate to ceftriaxone  - would consult  to help get micro records form [facility] (pt reports strep bacteremia ~2020)  - needs outpatient dental f/u to get teeth pulled.   - consider bone biopsy at stump for path/culture at time of wound closure.   - tentatively planning on 4 weeks iv ceftriaxone followed by 2 weeks levaquin [for treatment of bacteremia, can not r/o IE without KEL and can not r/o OM without bone biopsy]            Thank you for your consult. I will follow-up with patient. Please contact us if you have any additional questions.    Ami Gillis MD  Infectious Disease  [facility]    Subjective:     Principal Problem: Sepsis    HPI:   27 transgender female with h/o T1DM, R BKA, IBS admitted 12/5 with fatigue/fall. Says fever didn't start till hospital. Reports injury to L " "shoulder during fal. reports chronic nausea/intermittent loose stools c/w IBS. Reports chronic issues with stump and says there isn't enough skin for wound to heal. Seeing outpatiet wound care. Reports having strep bacteremia in the past (admit at [facility] ~2020) and says he was treated for about 6 weeks with iv abx. Reports that his teeth have been falling out. Says he's been having trouble getting into dental clinic.      CT  1. Multifocal patchy opacities seen within the lower lobes, left greater than right.  Findings may be seen with aspiration or infectious process/developing pneumonia in the right clinical setting.  No confluent consolidation seen.  2. Scattered liquid stool seen within the colon which may be seen with diarrheal illness.  3. Hepatosplenomegaly.    Bcx 12/5 with both sets positive for group G strep.   Blood Culture, Routine Gram stain leonard bottle: Gram positive cocci in chains resembling Strep P    Blood Culture, Routine Results called to and read back by:Sindi ALATORRE at 0243 aw P    Blood Culture, Routine Gram stain aer bottle: Gram positive cocci in chains resembling Strep P    Blood Culture, Routine Positive results previously called P    Blood Culture, Routine  Abnormal   STREPTOCOCCUS GROUP G   Susceptibility pending   Beta-hemolytic streptococci are routinely susceptible to   penicillins,cephalosporins and carbapenems.        Repeat 12/6 NGTD    2d echo without obvious vegetations.      Febrile 103 on arrival    Day #2 vanc/zosyn --> ceftriaxone     Xray right tib/fib showed osteomyelitis at the distal aspect of the tibia and fibula amputation site. The patient was given fluids, vancomycin, zosyn and was admitted for further management.     ID consulted for "osteomyelitis"      Interval history: NAEO. Reports fatigue and a fall, landing on L shoulder prior to arrival. Reports chronic nausea/intermittent loose stools c/w IBS. Reports chronic issues with stump and says there isn't enough skin " for wound to heal. Reports having strep bacteremia in the past (admit at [facility] ~2020) and says he was treated for about 6 weeks with iv abx. Reports that his teeth have been falling out. Says he's been having trouble getting into dental clinic.            Past Surgical History:   Procedure Laterality Date    COLONOSCOPY      INCISION OF PERIRECTAL ABSCESS N/A 6/10/2020    Procedure: INCISION, ABSCESS, PERIRECTAL;  Surgeon: Ronald Santo MD;  Location: [facility] OR [address] [place] [address];  Service: Colon and Rectal;  Laterality: N/A;  PATIENT IS COVID POSITIVE    TOE SURGERY      WOUND DEBRIDEMENT Right 6/10/2020    Procedure: DEBRIDEMENT, WOUND ISCHIORECTAL;  Surgeon: Ronald Santo MD;  Location: [facility] OR [address] [place] [address];  Service: Colon and Rectal;  Laterality: Right;       Review of patient's allergies indicates:   Allergen Reactions    Shrimp        No current facility-administered medications on file prior to encounter.     Current Outpatient Medications on File Prior to Encounter   Medication Sig    DESCOVY 200-25 mg Tab Take 1 tablet by mouth once daily.    insulin aspart U-100 (NOVOLOG) 100 unit/mL injection Inject 10 Units into the skin 3 (three) times daily before meals.    insulin glargine 100 units/mL (3mL) SubQ pen Inject 50 Units into the skin every evening. (Patient taking differently: Inject 60 Units into the skin every evening.)    spironolactone (ALDACTONE) 25 MG tablet Take 25 mg by mouth once daily.    estradioL (ESTRACE) 1 MG tablet Take 1 mg by mouth once daily.     Family History    None       Tobacco Use    Smoking status: Never    Smokeless tobacco: Never   Substance and Sexual Activity    Alcohol use: Yes     Comment: occasionally    Drug use: Not Currently    Sexual activity: Not on file     Review of Systems   Constitutional:  Positive for fever.   HENT: Negative.     Eyes: Negative.    Respiratory:  Positive for cough and shortness of breath.    Cardiovascular: Negative.    Gastrointestinal:   Positive for diarrhea, nausea and vomiting.        Chronic diarrhea related to IBS   Endocrine: Negative.    Genitourinary: Negative.    Musculoskeletal: Negative.    Skin: Negative.    Allergic/Immunologic: Negative.    Neurological: Negative.    Psychiatric/Behavioral: Negative.     Objective:     Vital Signs (Most Recent):  Temp: 97.5 °F (36.4 °C) (12/07/22 1603)  Pulse: 90 (12/07/22 1603)  Resp: 18 (12/07/22 1614)  BP: 100/62 (12/07/22 1603)  SpO2: 100 % (12/07/22 1603) Vital Signs (24h Range):  Temp:  [97.5 °F (36.4 °C)-98.1 °F (36.7 °C)] 97.5 °F (36.4 °C)  Pulse:  [75-90] 90  Resp:  [18] 18  SpO2:  [100 %] 100 %  BP: (100-125)/(62-85) 100/62     Weight: 97.9 kg (215 lb 13.3 oz)  Body mass index is 29.27 kg/m².    Physical Exam  Vitals and nursing note reviewed.   Constitutional:       General: She is not in acute distress.     Appearance: Normal appearance. She is not ill-appearing.   HENT:      Head: Normocephalic and atraumatic.      Nose: Nose normal.      Mouth/Throat:      Mouth: Mucous membranes are moist.   Eyes:      Extraocular Movements: Extraocular movements intact.   Cardiovascular:      Rate and Rhythm: Normal rate.      Pulses: Normal pulses.      Heart sounds: No murmur heard.  Pulmonary:      Effort: Pulmonary effort is normal. No respiratory distress.   Abdominal:      General: Abdomen is flat.      Palpations: Abdomen is soft.      Tenderness: There is no abdominal tenderness.   Musculoskeletal:      Right lower leg: No edema.      Left lower leg: No edema.      Comments: Stump without acute signs of infection. No drainage or deep tunneling    Pain to palpation and movement of L shoulder. No erythema or warmth   Skin:     General: Skin is warm.      Capillary Refill: Capillary refill takes less than 2 seconds.   Neurological:      General: No focal deficit present.      Mental Status: She is alert.   Psychiatric:         Mood and Affect: Mood normal.           Significant Labs: All pertinent  labs within the past 24 hours have been reviewed.    Significant Imaging: I have reviewed all pertinent imaging results/findings within the past 24 hours.

## 2022-12-08 NOTE — PROGRESS NOTES
Pharmacokinetic Assessment Follow Up: IV Vancomycin    Vancomycin serum concentration assessment(s):    The trough level was drawn correctly and can be used to guide therapy at this time. The measurement is above the desired definitive target range of 10 to 20 mcg/mL.    Vancomycin Regimen Plan:    Discontinue the scheduled vancomycin regimen and re-dose when the random level is less than 20 mcg/mL, next level to be drawn at 04:00 on 12/8/22.    Drug levels (last 3 results):  Recent Labs   Lab Result Units 12/07/22  1707   Vancomycin-Trough ug/mL 30.9*       Pharmacy will continue to follow and monitor vancomycin.    Please contact pharmacy at extension [phone] for questions regarding this assessment.    Thank you for the consult,   Lisseth Huggins       Patient brief summary:  Pratik Torres is a 27 y.o. adult initiated on antimicrobial therapy with IV Vancomycin for treatment of skin & soft tissue infection    The patient's current regimen is vancomycin 1.5 g every 24 hours.    Drug Allergies:   Review of patient's allergies indicates:   Allergen Reactions    Shrimp        Actual Body Weight:   97.9 kg    Renal Function:   Estimated Creatinine Clearance (based on SCr of 2.9 mg/dL (H))  Female: 38.2 mL/min (A)  Male: 46.4 mL/min (A)  Hover for info,     Dialysis Method (if applicable):  N/A    CBC (last 72 hours):  Recent Labs   Lab Result Units 12/05/22  1646 12/06/22  0020 12/06/22  0954   WBC K/uL 8.75 10.76 10.43   Hemoglobin g/dL 5.9* 5.9* 8.2*   Hemoglobin A1C %  --  10.2*  --    Hematocrit % 19.5* 19.2* 25.9*   Platelets K/uL 209 198 233   Gran % % 85.3* 72.6 73.9*   Lymph % % 8.7* 13.2* 14.9*   Mono % % 5.0 13.2 9.4   Eosinophil % % 0.0 0.1 1.2   Basophil % % 0.1 0.1 0.2   Differential Method  Automated Automated Automated       Metabolic Panel (last 72 hours):  Recent Labs   Lab Result Units 12/05/22  1646 12/06/22  0020 12/06/22  0408 12/06/22  0954 12/07/22  1410   Sodium mmol/L 128* 127*  --  130* 132*    Potassium mmol/L 4.3 4.4  --  3.8 4.2   Chloride mmol/L 99 99  --  100 104   CO2 mmol/L 22* 21*  --  22* 23   Glucose mg/dL 62* 108  --  87 97   Glucose, UA   --   --  Negative  --   --    BUN mg/dL 24* 24*  --  22* 28*   Creatinine mg/dL 2.1* 2.3*  --  2.5* 2.9*   Albumin g/dL 1.6*  --   --   --   --    Total Bilirubin mg/dL 0.8  --   --   --   --    Alkaline Phosphatase U/L 116  --   --   --   --    AST U/L 26  --   --   --   --    ALT U/L 9*  --   --   --   --    Magnesium mg/dL  --   --   --  1.6  --    Phosphorus mg/dL  --   --   --  3.9  --        Vancomycin Administrations:  vancomycin given in the last 96 hours                     vancomycin 1.5 g in dextrose 5 % 250 mL IVPB (ready to mix) (mg) 1,500 mg New Bag 12/06/22 1824    vancomycin (VANCOCIN) 2,000 mg in dextrose 5 % 500 mL IVPB (mg) 2,000 mg New Bag 12/05/22 1752                    Microbiologic Results:  Microbiology Results (last 7 days)       Procedure Component Value Units Date/Time    Blood culture [[id number]] Collected: 12/06/22 0954    Order Status: Completed Specimen: Blood from Peripheral, Right Hand Updated: 12/07/22 1103     Blood Culture, Routine No Growth to date      No Growth to date    Blood culture [[id number]] Collected: 12/06/22 0954    Order Status: Completed Specimen: Blood from Antecubital, Right Updated: 12/07/22 1103     Blood Culture, Routine No Growth to date      No Growth to date    Blood culture x two cultures. Draw prior to antibiotics. [[id number]]  (Abnormal) Collected: 12/05/22 1646    Order Status: Completed Specimen: Blood from Peripheral, Antecubital, Left Updated: 12/07/22 1101     Blood Culture, Routine Gram stain aer bottle: Gram positive cocci in chains resembling Strep      Gram stain leonard bottle: Gram positive cocci in chains resembling Strep      Positive results previously called      STREPTOCOCCUS GROUP G  Beta-hemolytic streptococci are routinely susceptible to   penicillins,cephalosporins and  carbapenems.      Narrative:      Aerobic and anaerobic    Blood culture x two cultures. Draw prior to antibiotics. [[id number]]  (Abnormal) Collected: 12/05/22 1654    Order Status: Completed Specimen: Blood from Peripheral, Forearm, Left Updated: 12/07/22 1059     Blood Culture, Routine Gram stain leonard bottle: Gram positive cocci in chains resembling Strep      Results called to and read back by:Sindi ALATORRE at 0243 aw      Gram stain aer bottle: Gram positive cocci in chains resembling Strep      Positive results previously called      STREPTOCOCCUS GROUP G  Susceptibility pending  Beta-hemolytic streptococci are routinely susceptible to   penicillins,cephalosporins and carbapenems.      Narrative:      Aerobic and anaerobic    Clostridium difficile EIA [[id number]] Collected: 12/06/22 2256    Order Status: Sent Specimen: Stool Updated: 12/06/22 2256    Clostridium difficile EIA [[id number]]     Order Status: Canceled Specimen: Stool     Culture, Respiratory with Gram Stain [[id number]]     Order Status: No result Specimen: Respiratory from Sputum

## 2022-12-08 NOTE — SUBJECTIVE & OBJECTIVE
Interval history: NAEO. Reports fatigue and a fall, landing on L shoulder prior to arrival. Reports chronic nausea/intermittent loose stools c/w IBS. Reports chronic issues with stump and says there isn't enough skin for wound to heal. Reports having strep bacteremia in the past (admit at [facility] ~2020) and says he was treated for about 6 weeks with iv abx. Reports that his teeth have been falling out. Says he's been having trouble getting into dental clinic.            Past Surgical History:   Procedure Laterality Date    COLONOSCOPY      INCISION OF PERIRECTAL ABSCESS N/A 6/10/2020    Procedure: INCISION, ABSCESS, PERIRECTAL;  Surgeon: Ronald Santo MD;  Location: [facility] OR [address] [place] [address];  Service: Colon and Rectal;  Laterality: N/A;  PATIENT IS COVID POSITIVE    TOE SURGERY      WOUND DEBRIDEMENT Right 6/10/2020    Procedure: DEBRIDEMENT, WOUND ISCHIORECTAL;  Surgeon: Ronald Santo MD;  Location: [facility] OR [address] [place] [address];  Service: Colon and Rectal;  Laterality: Right;       Review of patient's allergies indicates:   Allergen Reactions    Shrimp        No current facility-administered medications on file prior to encounter.     Current Outpatient Medications on File Prior to Encounter   Medication Sig    DESCOVY 200-25 mg Tab Take 1 tablet by mouth once daily.    insulin aspart U-100 (NOVOLOG) 100 unit/mL injection Inject 10 Units into the skin 3 (three) times daily before meals.    insulin glargine 100 units/mL (3mL) SubQ pen Inject 50 Units into the skin every evening. (Patient taking differently: Inject 60 Units into the skin every evening.)    spironolactone (ALDACTONE) 25 MG tablet Take 25 mg by mouth once daily.    estradioL (ESTRACE) 1 MG tablet Take 1 mg by mouth once daily.     Family History    None       Tobacco Use    Smoking status: Never    Smokeless tobacco: Never   Substance and Sexual Activity    Alcohol use: Yes     Comment: occasionally    Drug use: Not Currently    Sexual activity: Not on file     Review of  Systems   Constitutional:  Positive for fever.   HENT: Negative.     Eyes: Negative.    Respiratory:  Positive for cough and shortness of breath.    Cardiovascular: Negative.    Gastrointestinal:  Positive for diarrhea, nausea and vomiting.        Chronic diarrhea related to IBS   Endocrine: Negative.    Genitourinary: Negative.    Musculoskeletal: Negative.    Skin: Negative.    Allergic/Immunologic: Negative.    Neurological: Negative.    Psychiatric/Behavioral: Negative.     Objective:     Vital Signs (Most Recent):  Temp: 97.5 °F (36.4 °C) (12/07/22 1603)  Pulse: 90 (12/07/22 1603)  Resp: 18 (12/07/22 1614)  BP: 100/62 (12/07/22 1603)  SpO2: 100 % (12/07/22 1603) Vital Signs (24h Range):  Temp:  [97.5 °F (36.4 °C)-98.1 °F (36.7 °C)] 97.5 °F (36.4 °C)  Pulse:  [75-90] 90  Resp:  [18] 18  SpO2:  [100 %] 100 %  BP: (100-125)/(62-85) 100/62     Weight: 97.9 kg (215 lb 13.3 oz)  Body mass index is 29.27 kg/m².    Physical Exam  Vitals and nursing note reviewed.   Constitutional:       General: She is not in acute distress.     Appearance: Normal appearance. She is not ill-appearing.   HENT:      Head: Normocephalic and atraumatic.      Nose: Nose normal.      Mouth/Throat:      Mouth: Mucous membranes are moist.   Eyes:      Extraocular Movements: Extraocular movements intact.   Cardiovascular:      Rate and Rhythm: Normal rate.      Pulses: Normal pulses.      Heart sounds: No murmur heard.  Pulmonary:      Effort: Pulmonary effort is normal. No respiratory distress.   Abdominal:      General: Abdomen is flat.      Palpations: Abdomen is soft.      Tenderness: There is no abdominal tenderness.   Musculoskeletal:      Right lower leg: No edema.      Left lower leg: No edema.      Comments: Stump without acute signs of infection. No drainage or deep tunneling    Pain to palpation and movement of L shoulder. No erythema or warmth   Skin:     General: Skin is warm.      Capillary Refill: Capillary refill takes less than  2 seconds.   Neurological:      General: No focal deficit present.      Mental Status: She is alert.   Psychiatric:         Mood and Affect: Mood normal.           Significant Labs: All pertinent labs within the past 24 hours have been reviewed.    Significant Imaging: I have reviewed all pertinent imaging results/findings within the past 24 hours.

## 2022-12-09 PROBLEM — B95.5 STREPTOCOCCAL BACTEREMIA: Status: ACTIVE | Noted: 2022-12-06

## 2022-12-09 LAB
ANION GAP SERPL CALC-SCNC: 4 MMOL/L (ref 8–16)
BUN SERPL-MCNC: 20 MG/DL (ref 6–20)
CALCIUM SERPL-MCNC: 8 MG/DL (ref 8.7–10.5)
CHLORIDE SERPL-SCNC: 107 MMOL/L (ref 95–110)
CO2 SERPL-SCNC: 22 MMOL/L (ref 23–29)
CREAT SERPL-MCNC: 2.1 MG/DL (ref 0.5–1.4)
EST. GFR  (NO RACE VARIABLE): 43 ML/MIN/1.73 M^2
GLUCOSE SERPL-MCNC: 189 MG/DL (ref 70–110)
POCT GLUCOSE: 172 MG/DL (ref 70–110)
POCT GLUCOSE: 179 MG/DL (ref 70–110)
POCT GLUCOSE: 216 MG/DL (ref 70–110)
POCT GLUCOSE: 47 MG/DL (ref 70–110)
POCT GLUCOSE: 99 MG/DL (ref 70–110)
POTASSIUM SERPL-SCNC: 4.5 MMOL/L (ref 3.5–5.1)
SODIUM SERPL-SCNC: 133 MMOL/L (ref 136–145)

## 2022-12-09 PROCEDURE — 97161 PT EVAL LOW COMPLEX 20 MIN: CPT

## 2022-12-09 PROCEDURE — 80048 BASIC METABOLIC PNL TOTAL CA: CPT | Performed by: STUDENT IN AN ORGANIZED HEALTH CARE EDUCATION/TRAINING PROGRAM

## 2022-12-09 PROCEDURE — 97165 OT EVAL LOW COMPLEX 30 MIN: CPT

## 2022-12-09 PROCEDURE — 63600175 PHARM REV CODE 636 W HCPCS: Performed by: ORTHOPAEDIC SURGERY

## 2022-12-09 PROCEDURE — 99233 SBSQ HOSP IP/OBS HIGH 50: CPT | Mod: ,,, | Performed by: INTERNAL MEDICINE

## 2022-12-09 PROCEDURE — 21400001 HC TELEMETRY ROOM

## 2022-12-09 PROCEDURE — 99233 PR SUBSEQUENT HOSPITAL CARE,LEVL III: ICD-10-PCS | Mod: ,,, | Performed by: INTERNAL MEDICINE

## 2022-12-09 PROCEDURE — 25000003 PHARM REV CODE 250: Performed by: STUDENT IN AN ORGANIZED HEALTH CARE EDUCATION/TRAINING PROGRAM

## 2022-12-09 PROCEDURE — 36415 COLL VENOUS BLD VENIPUNCTURE: CPT | Performed by: STUDENT IN AN ORGANIZED HEALTH CARE EDUCATION/TRAINING PROGRAM

## 2022-12-09 PROCEDURE — 25000003 PHARM REV CODE 250: Performed by: ORTHOPAEDIC SURGERY

## 2022-12-09 RX ADMIN — Medication 24 G: at 11:12

## 2022-12-09 RX ADMIN — INSULIN ASPART 10 UNITS: 100 INJECTION, SOLUTION INTRAVENOUS; SUBCUTANEOUS at 08:12

## 2022-12-09 RX ADMIN — INSULIN ASPART 2 UNITS: 100 INJECTION, SOLUTION INTRAVENOUS; SUBCUTANEOUS at 08:12

## 2022-12-09 RX ADMIN — SODIUM CHLORIDE: 0.9 INJECTION, SOLUTION INTRAVENOUS at 10:12

## 2022-12-09 RX ADMIN — CEFTRIAXONE SODIUM 2 G: 2 INJECTION, SOLUTION INTRAVENOUS at 11:12

## 2022-12-09 RX ADMIN — INSULIN DETEMIR 50 UNITS: 100 INJECTION, SOLUTION SUBCUTANEOUS at 09:12

## 2022-12-09 RX ADMIN — SPIRONOLACTONE 25 MG: 25 TABLET, FILM COATED ORAL at 08:12

## 2022-12-09 RX ADMIN — ESTRADIOL 1 MG: 1 TABLET ORAL at 08:12

## 2022-12-09 RX ADMIN — INSULIN DETEMIR 60 UNITS: 100 INJECTION, SOLUTION SUBCUTANEOUS at 08:12

## 2022-12-09 RX ADMIN — SODIUM CHLORIDE: 0.9 INJECTION, SOLUTION INTRAVENOUS at 07:12

## 2022-12-09 NOTE — PLAN OF CARE
Problem: Adult Inpatient Plan of Care  Goal: Plan of Care Review  Outcome: Ongoing, Progressing  Goal: Patient-Specific Goal (Individualized)  Outcome: Ongoing, Progressing  Goal: Absence of Hospital-Acquired Illness or Injury  Outcome: Ongoing, Progressing  Goal: Optimal Comfort and Wellbeing  Outcome: Ongoing, Progressing  Goal: Readiness for Transition of Care  Outcome: Ongoing, Progressing     Problem: Skin Injury Risk Increased  Goal: Skin Health and Integrity  Outcome: Ongoing, Progressing     Problem: Diabetes Comorbidity  Goal: Blood Glucose Level Within Targeted Range  Outcome: Ongoing, Progressing     Problem: Adjustment to Illness (Sepsis/Septic Shock)  Goal: Optimal Coping  Outcome: Ongoing, Progressing     Problem: Bleeding (Sepsis/Septic Shock)  Goal: Absence of Bleeding  Outcome: Ongoing, Progressing     Problem: Glycemic Control Impaired (Sepsis/Septic Shock)  Goal: Blood Glucose Level Within Desired Range  Outcome: Ongoing, Progressing     Problem: Infection Progression (Sepsis/Septic Shock)  Goal: Absence of Infection Signs and Symptoms  Outcome: Ongoing, Progressing     Problem: Nutrition Impaired (Sepsis/Septic Shock)  Goal: Optimal Nutrition Intake  Outcome: Ongoing, Progressing     Problem: Fluid and Electrolyte Imbalance (Acute Kidney Injury/Impairment)  Goal: Fluid and Electrolyte Balance  Outcome: Ongoing, Progressing     Problem: Oral Intake Inadequate (Acute Kidney Injury/Impairment)  Goal: Optimal Nutrition Intake  Outcome: Ongoing, Progressing     Problem: Renal Function Impairment (Acute Kidney Injury/Impairment)  Goal: Effective Renal Function  Outcome: Ongoing, Progressing     Problem: Fluid Imbalance (Pneumonia)  Goal: Fluid Balance  Outcome: Ongoing, Progressing     Problem: Infection (Pneumonia)  Goal: Resolution of Infection Signs and Symptoms  Outcome: Ongoing, Progressing     Problem: Respiratory Compromise (Pneumonia)  Goal: Effective Oxygenation and Ventilation  Outcome:  Ongoing, Progressing     Problem: Infection  Goal: Absence of Infection Signs and Symptoms  Outcome: Ongoing, Progressing     Problem: Impaired Wound Healing  Goal: Optimal Wound Healing  Outcome: Ongoing, Progressing     Problem: Fall Injury Risk  Goal: Absence of Fall and Fall-Related Injury  Outcome: Ongoing, Progressing

## 2022-12-09 NOTE — PLAN OF CARE
Problem: Physical Therapy  Goal: Physical Therapy Goal  Description: Goals to be met by: 22     Patient will increase functional independence with mobility by performin. Supine to sit with Modified Jennings  2. Rolling to Left and Right with Modified Jennings  3. Sit to stand transfer with Modified Jennings using RW  4. Bed to chair transfer with Modified Jennings using Rolling Walker  5. Gait x50 feet with Modified Jennings using Rolling Walker   6. Wheelchair propulsion x150 feet with Modified Jennings using bilateral upper extremities  7. Lower extremity exercise program 2 sets x15 reps per handout, with independence    Outcome: Ongoing, Progressing     Pt ambulated ~6 ft with CGA using RW.

## 2022-12-09 NOTE — CONSULTS
"[facility] [place] - Telemetry  Infectious Disease  Consult Note    Patient Name: Pratik Torres  MRN: [id number]  Admission Date: 12/5/2022  Hospital Length of Stay: 4 days  Attending Physician: Ernie Townsend MD  Primary Care Provider: Felipe Summers MD     Isolation Status: No active isolations    Patient information was obtained from patient and ER records.      Consults  Assessment/Plan:     * Bacterial infection due to Streptococcus, group G  27 transgender female with h/o T1DM, R BKA, IBS, dental caries admitted 12/5 with fatigue/fall. Reports chronic GI symptoms and stump wound. Notably reports h/o strep bacteremia ~2020. CT L>R opacity. Bcx 12/5 with both sets positive for group G strep. Repeat 12/6 NGTD. 2d echo negative for vegetations.ID consulted for "osteomyelitis"    Source unclear- dental caries? Endocarditis? Osteomyelitis? Pneumonia? Stump wound seems chronic and no active drainage. Bone was exposed and he underwent additional amputation and wound closure. Unfortunately no path/cultures were sent.  Given that his teeth are necrotic and he reports they are breaking apart, high suspicion that could be source. No respiratory symptoms, so pneumonia seems less likely. Can not exclude infective endocarditis without KEL, but patient refuses.     Recommendations:   - de-escalate to ceftriaxone  - would consult SW to help get micro records form [facility] (pt reports strep bacteremia ~2020)  - needs outpatient dental f/u to get teeth pulled.   - continue HIV PreP per home meds (ok to subsitute truvada for descovy while inpatient)  - awaiting call back from ortho to discuss if they think residual osteomyelitis is present.   - tentatively planning on 4 weeks iv ceftriaxone +/-  2 weeks levaquin TBD outpatient [for treatment of bacteremia, can not r/o IE without KEL and can not r/o OM without bone biopsy/path]    Outpatient Antibiotic Therapy Plan:    Please send referral to [facility].    1) Infection: strep bacteremia    2) Discharge Antibiotics:    Intravenous antibiotics:   Ceftriaxone 2gm iv daily    3) Therapy Duration:  4 weeks    Estimated end date of IV antibiotics: 1/1/22    4) Outpatient Weekly Labs:    Order the following labs to be drawn on Mondays:    CBC   CMP    CRP    5) Fax Lab Results to Infectious Diseases Provider: Dr Gillis    [facility] Fax Number: [phone]    6) Outpatient Infectious Diseases Follow-up     Follow-up appointment will be arranged by the ID clinic and will be found in the patient's appointments tab.     Prior to discharge, please ensure the patient's follow-up has been scheduled.     If there is still no follow-up scheduled prior to discharge, please send an EPIC message to Mirna Rizvi in Infectious Diseases.                      Thank you for your consult. I will sign off. Please contact us if you have any additional questions.    Ami Gillis MD  Infectious Disease  [facility] - Telemetry    Subjective:     Principal Problem: Bacterial infection due to Streptococcus, group G    HPI:   27 transgender female with h/o T1DM, R BKA, IBS admitted 12/5 with fatigue/fall. Says fever didn't start till hospital. Reports injury to L shoulder during fal. reports chronic nausea/intermittent loose stools c/w IBS. Reports chronic issues with stump and says there isn't enough skin for wound to heal. Seeing outpatiet wound care. Reports having strep bacteremia in the past (admit at [facility] ~2020) and says he was treated for about 6 weeks with iv abx. Reports that his teeth have been falling out. Says he's been having trouble getting into dental clinic.      CT  1. Multifocal patchy opacities seen within the lower lobes, left greater than right.  Findings may be seen with aspiration or infectious process/developing pneumonia in the right clinical setting.  No confluent consolidation seen.  2. Scattered liquid stool seen within the colon which may  "be seen with diarrheal illness.  3. Hepatosplenomegaly.    Bcx 12/5 with both sets positive for group G strep.   Blood Culture, Routine Gram stain leonard bottle: Gram positive cocci in chains resembling Strep P    Blood Culture, Routine Results called to and read back by:Sinid ALATORRE at 0243 aw P    Blood Culture, Routine Gram stain aer bottle: Gram positive cocci in chains resembling Strep P    Blood Culture, Routine Positive results previously called P    Blood Culture, Routine  Abnormal   STREPTOCOCCUS GROUP G   Susceptibility pending   Beta-hemolytic streptococci are routinely susceptible to   penicillins,cephalosporins and carbapenems.        Repeat 12/6 NGTD    2d echo without obvious vegetations.      Febrile 103 on arrival    Day #2 vanc/zosyn --> ceftriaxone     Xray right tib/fib showed osteomyelitis at the distal aspect of the tibia and fibula amputation site. The patient was given fluids, vancomycin, zosyn and was admitted for further management.     ID consulted for "osteomyelitis"      Interval history: NAEO. S/p stump closure yesterday. Hiding under covers and refusing to be examined. Refuses KEL, but agreeable to prolonged iv abx at discharge.            Past Surgical History:   Procedure Laterality Date    BELOW KNEE AMPUTATION OF LOWER EXTREMITY Right 12/8/2022    Procedure: AMPUTATION, BELOW KNEE I&D /REVISION SAW/PULSA VAC;  Surgeon: Henry Tolliver MD;  Location: [facility] OR;  Service: Orthopedics;  Laterality: Right;    COLONOSCOPY      INCISION OF PERIRECTAL ABSCESS N/A 6/10/2020    Procedure: INCISION, ABSCESS, PERIRECTAL;  Surgeon: Ronald Santo MD;  Location: [facility] OR [address] [place] [address];  Service: Colon and Rectal;  Laterality: N/A;  PATIENT IS COVID POSITIVE    TOE SURGERY      WOUND DEBRIDEMENT Right 6/10/2020    Procedure: DEBRIDEMENT, WOUND ISCHIORECTAL;  Surgeon: Ronald Santo MD;  Location: [facility] OR [address] [place] [address];  Service: Colon and Rectal;  Laterality: Right;       Review of patient's allergies " indicates:   Allergen Reactions    Shrimp        No current facility-administered medications on file prior to encounter.     Current Outpatient Medications on File Prior to Encounter   Medication Sig    DESCOVY 200-25 mg Tab Take 1 tablet by mouth once daily.    insulin aspart U-100 (NOVOLOG) 100 unit/mL injection Inject 10 Units into the skin 3 (three) times daily before meals.    insulin glargine 100 units/mL (3mL) SubQ pen Inject 50 Units into the skin every evening. (Patient taking differently: Inject 60 Units into the skin every evening.)    spironolactone (ALDACTONE) 25 MG tablet Take 25 mg by mouth once daily.    estradioL (ESTRACE) 1 MG tablet Take 1 mg by mouth once daily.     Family History    None       Tobacco Use    Smoking status: Never    Smokeless tobacco: Never   Substance and Sexual Activity    Alcohol use: Yes     Comment: occasionally    Drug use: Not Currently    Sexual activity: Not on file     Review of Systems   Constitutional:  Positive for fever.   HENT: Negative.     Eyes: Negative.    Respiratory:  Positive for cough and shortness of breath.    Cardiovascular: Negative.    Gastrointestinal:  Positive for diarrhea, nausea and vomiting.        Chronic diarrhea related to IBS   Endocrine: Negative.    Genitourinary: Negative.    Musculoskeletal: Negative.    Skin: Negative.    Allergic/Immunologic: Negative.    Neurological: Negative.    Psychiatric/Behavioral: Negative.     Objective:     Vital Signs (Most Recent):  Temp: 97.4 °F (36.3 °C) (12/09/22 1140)  Pulse: 99 (12/09/22 1140)  Resp: 18 (12/09/22 1140)  BP: 124/69 (12/09/22 1140)  SpO2: 100 % (12/09/22 1140) Vital Signs (24h Range):  Temp:  [97.4 °F (36.3 °C)-98.3 °F (36.8 °C)] 97.4 °F (36.3 °C)  Pulse:  [82-99] 99  Resp:  [17-20] 18  SpO2:  [95 %-100 %] 100 %  BP: (124-142)/(69-89) 124/69     Weight: 97.9 kg (215 lb 13.3 oz)  Body mass index is 29.27 kg/m².    Physical Exam  Vitals and nursing note reviewed.    Constitutional:       General: She is not in acute distress.     Appearance: Normal appearance. She is not ill-appearing.   HENT:      Head: Normocephalic and atraumatic.      Nose: Nose normal.   Skin:     Capillary Refill: Capillary refill takes less than 2 seconds.      Comments: Stump with dressings c/d/i   Neurological:      General: No focal deficit present.      Mental Status: She is alert.           Significant Labs: All pertinent labs within the past 24 hours have been reviewed.    Significant Imaging: I have reviewed all pertinent imaging results/findings within the past 24 hours.

## 2022-12-09 NOTE — ASSESSMENT & PLAN NOTE
"27 transgender female with h/o T1DM, R BKA, IBS, dental caries admitted 12/5 with fatigue/fall. Reports chronic GI symptoms and stump wound. Notably reports h/o strep bacteremia ~2020. CT L>R opacity. Bcx 12/5 with both sets positive for group G strep. Repeat 12/6 NGTD. 2d echo negative for vegetations.ID consulted for "osteomyelitis"    Source unclear- dental caries? Endocarditis? Osteomyelitis? Pneumonia? Stump wound seems chronic and no active drainage. Bone was exposed and he underwent additional amputation and wound closure. Unfortunately no path/cultures were sent.  Given that his teeth are necrotic and he reports they are breaking apart, high suspicion that could be source. No respiratory symptoms, so pneumonia seems less likely. Can not exclude infective endocarditis without KEL, but patient refuses.     Recommendations:   - de-escalate to ceftriaxone  - would consult SW to help get micro records form [facility] (pt reports strep bacteremia ~2020)  - needs outpatient dental f/u to get teeth pulled.   - continue HIV PreP per home meds (ok to subsitute truvada for descovy while inpatient)  - awaiting call back from ortho to discuss if they think residual osteomyelitis is present.   - tentatively planning on 4 weeks iv ceftriaxone +/-  2 weeks levaquin TBD outpatient [for treatment of bacteremia, can not r/o IE without KEL and can not r/o OM without bone biopsy/path]    Outpatient Antibiotic Therapy Plan:    Please send referral to [facility].    1) Infection: strep bacteremia    2) Discharge Antibiotics:    Intravenous antibiotics:   Ceftriaxone 2gm iv daily    3) Therapy Duration:  4 weeks    Estimated end date of IV antibiotics: 1/1/22    4) Outpatient Weekly Labs:    Order the following labs to be drawn on Mondays:    CBC   CMP    CRP    5) Fax Lab Results to Infectious Diseases Provider: Dr Gillis    [facility] Fax Number: [phone]    6) Outpatient Infectious " Diseases Follow-up     Follow-up appointment will be arranged by the ID clinic and will be found in the patient's appointments tab.     Prior to discharge, please ensure the patient's follow-up has been scheduled.     If there is still no follow-up scheduled prior to discharge, please send an EPIC message to Mirna Rizvi in Infectious Diseases.

## 2022-12-09 NOTE — PT/OT/SLP EVAL
"Occupational Therapy   Evaluation    Name: Pratik Torres; preferred name "Ana", she/her pronouns  MRN: [id number]  Admitting Diagnosis: Sepsis  Recent Surgery: Procedure(s) (LRB):  AMPUTATION, BELOW KNEE I&D /REVISION SAW/PULSA VAC (Right) 1 Day Post-Op    Recommendations:     Discharge Recommendations: home health OT (with caregiver assistance)  Discharge Equipment Recommendations:  none  Barriers to discharge:  None    Assessment:     Pratik Torres is a 27 y.o. adult with a medical diagnosis of Sepsis. Performance deficits affecting function: pain, orthopedic precautions, impaired skin, impaired functional mobility, impaired self care skills, decreased safety awareness, impaired balance, gait instability, weakness, decreased lower extremity function, decreased upper extremity function, decreased ROM.      OT rec HHOT with caregiver assistance at d/c in order to increase safety and independence with ADLs and functional mobility.      Pt c/o L shoulder pain since she passed out just prior to admission. Nurse, Stefany, notified of pt's main complaint    Rehab Prognosis: Good; patient would benefit from acute skilled OT services to address these deficits and reach maximum level of function.       Plan:     Patient to be seen 3 x/week to address the above listed problems via self-care/home management, therapeutic activities, therapeutic exercises  Plan of Care Expires: 12/23/22  Plan of Care Reviewed with: patient    Subjective     Chief Complaint: L shoulder pain   Patient/Family Comments/goals: declined therapy providing a chair to promote OOB    Occupational Profile:  Living Environment: pt lives with 5 cousins in a 1st floor apartment with no steps at entry.   Previous level of function: pt was MOD I with use of prosthetic and no DME. When wound developed, pt was MOD I with use of RW vs w/c in the home without prosthesis. MOD I with ADLs.   Roles and Routines: has transportation services through insurance   Equipment " Used at Home: walker, rolling, wheelchair  Assistance upon Discharge: cousins; mother     Pain/Comfort:  Pain Rating 1:  (Pt with no pain to RLE, c/o L shoulder pain)  Pain Addressed 1: Reposition, Distraction, Cessation of Activity, Nurse notified    Patients cultural, spiritual, Cheondoism conflicts given the current situation: no    Objective:     Communicated with: nurseStefany, prior to session.  Patient found HOB elevated with peripheral IV, telemetry upon OT entry to room.    General Precautions: Standard, fall, diabetic  Orthopedic Precautions:  (RLE NWB 2* BKA I+D and revision)  Braces: N/A  Respiratory Status: Room air    Occupational Performance:    Bed Mobility:    Patient completed Scooting with supervision  Patient completed Supine to Sit with supervision    Functional Mobility/Transfers:  Patient completed Sit <> Stand Transfer with contact guard assistance  with  rolling walker   Functional Mobility: Gait belt donned prior to transfer for safety during mobility/transfers. Pt completed ~6 ft using RW with CGA; further distance limited 2* L shoulder pain.     Activities of Daily Living:  Upper Body Dressing: modified independence with back gown seated unsupported EOB   Lower Body Dressing: supervision seated EOB to adjust L sock     Cognitive/Visual Perceptual:  Cognitive/Psychosocial Skills:     -       Follows Commands/attention:Follows multistep  commands  -       Communication: clear/fluent  -       Memory: No Deficits noted  -       Safety awareness/insight to disability: mildly impaired   -       Mood/Affect/Coping skills/emotional control: Appropriate to situation  Visual/Perceptual:      -Intact  R/L discrimination      Physical Exam:  Balance:    -       seated: SUP; standing: CGA with RW   Postural examination/scapula alignment:    -       No postural abnormalities identified  Skin integrity: Visible skin intact and dressing intact to RLE  Edema:  no BUE edema noted  Sensation:    -        Intact  light/touch BUE  Dominant hand:    -       Right  Upper Extremity Range of Motion:     -       Right Upper Extremity: WFL  -       Left Upper Extremity: WFL except pain with shoulder flexion- AROM limited to ~partial range; PROM WFL   Upper Extremity Strength:    -       Right Upper Extremity: WFL  -       Left Upper Extremity: WFL   Strength:    -       Right Upper Extremity: WFL  -       Left Upper Extremity: WFL  Fine Motor Coordination:    -       Intact  Left hand, manipulation of objects and Right hand, manipulation of objects  Gross motor coordination:   WFL    AMPAC 6 Click ADL:  AMPAC Total Score: 24    Treatment & Education:  Pt educated on OT role/POC.   Importance of OOB activity with staff supervision.   Safety during functional t/f and mobility   Self-care tasks/functional mobility completed- assistance level noted above   Encouraged LUE AROM pain free ROM  All questions/concerns answered within OT scope of practice       Patient left sitting edge of bed with all lines intact, call button in reach, nurse, Stefany, notified, and all needs met/within reach     GOALS:   Multidisciplinary Problems       Occupational Therapy Goals          Problem: Occupational Therapy    Goal Priority Disciplines Outcome Interventions   Occupational Therapy Goal     OT, PT/OT Ongoing, Progressing    Description: Goals to be met by: 12/23/22     Patient will increase functional independence with ADLs by performing:    LE Dressing with Modified Pike.  Toileting from toilet with Modified Pike for hygiene and clothing management.   Step transfer with Modified Pike  Toilet transfer to toilet with Modified Pike.  Upper extremity exercise program x15 reps per handout, with independence.                         History:     Past Medical History:   Diagnosis Date    Diabetes mellitus     IBS (irritable bowel syndrome)          Past Surgical History:   Procedure Laterality Date    BELOW  KNEE AMPUTATION OF LOWER EXTREMITY Right 12/8/2022    Procedure: AMPUTATION, BELOW KNEE I&D /REVISION SAW/PULSA VAC;  Surgeon: Henry Tolliver MD;  Location: [facility] OR;  Service: Orthopedics;  Laterality: Right;    COLONOSCOPY      INCISION OF PERIRECTAL ABSCESS N/A 6/10/2020    Procedure: INCISION, ABSCESS, PERIRECTAL;  Surgeon: Ronald Santo MD;  Location: [facility] OR 2ND FLR;  Service: Colon and Rectal;  Laterality: N/A;  PATIENT IS COVID POSITIVE    TOE SURGERY      WOUND DEBRIDEMENT Right 6/10/2020    Procedure: DEBRIDEMENT, WOUND ISCHIORECTAL;  Surgeon: Ronald Santo MD;  Location: [facility] OR 2ND FLR;  Service: Colon and Rectal;  Laterality: Right;       Time Tracking:     OT Date of Treatment: 12/09/22  OT Start Time: 0958  OT Stop Time: 1013  OT Total Time (min): 15 min    Billable Minutes:Evaluation 15 min (co-eval with PT)    12/9/2022

## 2022-12-09 NOTE — SUBJECTIVE & OBJECTIVE
Interval History: Pt upset with medical care this AM but refusing to elaborate further. Denies complaints in a perfunctory manner.    Review of Systems   Constitutional:  Negative for chills and fever.   Respiratory:  Negative for cough and shortness of breath.    Cardiovascular:  Negative for chest pain and leg swelling.   Gastrointestinal:  Negative for abdominal distention and abdominal pain.   Objective:     Vital Signs (Most Recent):  Temp: 97.4 °F (36.3 °C) (12/09/22 1140)  Pulse: 99 (12/09/22 1140)  Resp: 18 (12/09/22 1140)  BP: 124/69 (12/09/22 1140)  SpO2: 100 % (12/09/22 1140) Vital Signs (24h Range):  Temp:  [96.8 °F (36 °C)-98.3 °F (36.8 °C)] 97.4 °F (36.3 °C)  Pulse:  [82-99] 99  Resp:  [17-20] 18  SpO2:  [95 %-100 %] 100 %  BP: (113-142)/(59-89) 124/69     Weight: 97.9 kg (215 lb 13.3 oz)  Body mass index is 29.27 kg/m².    Intake/Output Summary (Last 24 hours) at 12/9/2022 1301  Last data filed at 12/9/2022 0830  Gross per 24 hour   Intake 2280.42 ml   Output 1700 ml   Net 580.42 ml      Physical Exam  Constitutional:       General: She is not in acute distress.     Appearance: She is ill-appearing. She is not toxic-appearing or diaphoretic.   Cardiovascular:      Rate and Rhythm: Normal rate and regular rhythm.      Heart sounds: No murmur heard.    No gallop.   Pulmonary:      Effort: Pulmonary effort is normal. No respiratory distress.      Breath sounds: Normal breath sounds. No wheezing.   Abdominal:      General: Bowel sounds are normal. There is no distension.      Palpations: Abdomen is soft.      Tenderness: There is no abdominal tenderness.   Musculoskeletal:      Comments: Wound stump wrapped       Significant Labs: All pertinent labs within the past 24 hours have been reviewed.    Significant Imaging: I have reviewed all pertinent imaging results/findings within the past 24 hours.

## 2022-12-09 NOTE — SUBJECTIVE & OBJECTIVE
Interval history: NAEO. S/p stump closure yesterday. Hiding under covers and refusing to be examined. Refuses KEL, but agreeable to prolonged iv abx at discharge.            Past Surgical History:   Procedure Laterality Date    BELOW KNEE AMPUTATION OF LOWER EXTREMITY Right 12/8/2022    Procedure: AMPUTATION, BELOW KNEE I&D /REVISION SAW/PULSA VAC;  Surgeon: Henry Tolliver MD;  Location: [facility] OR;  Service: Orthopedics;  Laterality: Right;    COLONOSCOPY      INCISION OF PERIRECTAL ABSCESS N/A 6/10/2020    Procedure: INCISION, ABSCESS, PERIRECTAL;  Surgeon: Ronald Santo MD;  [facility];  Service: Colon and Rectal;  Laterality: N/A;  PATIENT IS COVID POSITIVE    TOE SURGERY      WOUND DEBRIDEMENT Right 6/10/2020    Procedure: DEBRIDEMENT, WOUND ISCHIORECTAL;  Surgeon: Ronald Santo MD;  [facility];  Service: Colon and Rectal;  Laterality: Right;       Review of patient's allergies indicates:   Allergen Reactions    Shrimp        No current facility-administered medications on file prior to encounter.     Current Outpatient Medications on File Prior to Encounter   Medication Sig    DESCOVY 200-25 mg Tab Take 1 tablet by mouth once daily.    insulin aspart U-100 (NOVOLOG) 100 unit/mL injection Inject 10 Units into the skin 3 (three) times daily before meals.    insulin glargine 100 units/mL (3mL) SubQ pen Inject 50 Units into the skin every evening. (Patient taking differently: Inject 60 Units into the skin every evening.)    spironolactone (ALDACTONE) 25 MG tablet Take 25 mg by mouth once daily.    estradioL (ESTRACE) 1 MG tablet Take 1 mg by mouth once daily.     Family History    None       Tobacco Use    Smoking status: Never    Smokeless tobacco: Never   Substance and Sexual Activity    Alcohol use: Yes     Comment: occasionally    Drug use: Not Currently    Sexual activity: Not on file     Review of Systems   Constitutional:  Positive for fever.   HENT: Negative.     Eyes:  Negative.    Respiratory:  Positive for cough and shortness of breath.    Cardiovascular: Negative.    Gastrointestinal:  Positive for diarrhea, nausea and vomiting.        Chronic diarrhea related to IBS   Endocrine: Negative.    Genitourinary: Negative.    Musculoskeletal: Negative.    Skin: Negative.    Allergic/Immunologic: Negative.    Neurological: Negative.    Psychiatric/Behavioral: Negative.     Objective:     Vital Signs (Most Recent):  Temp: 97.4 °F (36.3 °C) (12/09/22 1140)  Pulse: 99 (12/09/22 1140)  Resp: 18 (12/09/22 1140)  BP: 124/69 (12/09/22 1140)  SpO2: 100 % (12/09/22 1140) Vital Signs (24h Range):  Temp:  [97.4 °F (36.3 °C)-98.3 °F (36.8 °C)] 97.4 °F (36.3 °C)  Pulse:  [82-99] 99  Resp:  [17-20] 18  SpO2:  [95 %-100 %] 100 %  BP: (124-142)/(69-89) 124/69     Weight: 97.9 kg (215 lb 13.3 oz)  Body mass index is 29.27 kg/m².    Physical Exam  Vitals and nursing note reviewed.   Constitutional:       General: She is not in acute distress.     Appearance: Normal appearance. She is not ill-appearing.   HENT:      Head: Normocephalic and atraumatic.      Nose: Nose normal.   Skin:     Capillary Refill: Capillary refill takes less than 2 seconds.      Comments: Stump with dressings c/d/i   Neurological:      General: No focal deficit present.      Mental Status: She is alert.           Significant Labs: All pertinent labs within the past 24 hours have been reviewed.    Significant Imaging: I have reviewed all pertinent imaging results/findings within the past 24 hours.

## 2022-12-09 NOTE — ASSESSMENT & PLAN NOTE
PEDIATRIC ACUTE VISIT    CHIEF COMPLAINT:   Chief Complaint   Patient presents with   â¢ Follow-up        HISTORY OF PRESENT ILLNESS: Estefany Richardson is an 16year old male who presents with Mother with the following chief complaint   Chief Complaint   Patient presents with   â¢ Follow-up   . Estefany Richardson is here for weight check and labs    Doing hybrid learning with [employer]. Goes 2 days per week; Sarahi Bales Mom drives him. Does remote other 3 year days. In the fall will go in person 5 days per week. Sometimes he gets a little anxious. School handles it well. In a class with 5 other students. 1:1 adult to student. Walks every day with mom or dad at least one mile. Much calmer, sleeping well. Mom says last week he had a chest pain; lasted just a couple of seconds. Has some new skin lesions. Mom is wondering if they are psoriasis. REVIEW OF SYSTEMS:   Review of systems is negative except what is noted in the HPI. Past medical, family, and social history reviewed and updated per family. Current Outpatient Medications   Medication Sig Dispense Refill   â¢ metFORMIN (GLUCOPHAGE) 500 MG tablet Take 1 tablet by mouth 2 times daily (with meals). 30 tablet 3   â¢ OLANZapine (ZyPREXA) 10 MG tablet Take 10 mg by mouth 2 times daily. â¢ fluocinonide (LIDEX) 0.05 % ointment Apply thin layer to AA on elbows 1-2 times per day 60 g 3   â¢ gentamicin (GARAMYCIN) 0.1 % ointment Apply thin layer bid to pimple type sores 30 g 1   â¢ ammonium lactate (LAC-HYDRIN) 12 % cream Apply topically as needed for Dry Skin. 385 g 11   â¢ Chromium 200 MCG Cap Take 200 mcg by mouth daily. 30 capsule 3   â¢ Vitamin D, Ergocalciferol, 1.25 mg (50,000 units) capsule Take 1 capsule by mouth 1 day a week. 4 capsule 3   â¢ busPIRone (BUSPAR) 15 MG tablet Take 15 mg by mouth 2 times daily.      â¢ Cholecalciferol (Vitamin D3) 50 mcg (2,000 units) tablet      â¢ mometasone (ELOCON) 0.1 % ointment Apply thin layer to thickest psoriasis on trunk/ Resume estrace    "extremities 45 g 2   â¢ ketoconazole (NIZORAL) 2 % shampoo Use qotime you wash his hair. Work into scalp and allow to soak x 5 minutes then rinse out. 120 mL 11   â¢ fluocinonide 0.1 % Cream Apply thin layer qd to AA on scalp 60 g 2   â¢ halobetasol (ULTRAVATE) 0.05 % ointment Apply thin layer to thickest eczema on hands and wrists qd 50 g 2   â¢ nystatin (MYCOSTATIN) 777298 UNIT/GM cream Apply thin layer bid to AA on neck. (can mix with triamcinolone cream) 30 g 2   â¢ betamethasone dipropionate (DIPROSONE) 0.05 % ointment Apply thin layer to AA on elbows 1-2 times per day 50 g 2   â¢ triamcinolone (ARISTOCORT) 0.1 % cream Apply thin layer to red, rough itchy areas on face and around ears 80 g 2   â¢ betamethasone dipropionate (DIPROSONE) 0.05 % cream Apply thin layer to AA on scalp qd 45 g 3   â¢ guanFACINE (TENEX) 1 MG tablet TK 1 T PO  TID     â¢ terbinafine (LAMISIL) 250 MG tablet Take 1 tablet daily 30 tablet 1     No current facility-administered medications for this visit. ALLERGIES:  No Known Allergies     PHYSICAL EXAM:   Visit Vitals  /69   Pulse 73   Temp 97.8 Â°F (36.6 Â°C)   Ht 6' 1"" (1.854 m)   Wt (!) 134.8 kg (297 lb 2.9 oz)   SpO2 97%   BMI 39.21 kg/mÂ²      Physical Exam  Vitals and nursing note reviewed. Constitutional:       Appearance: Normal appearance. HENT:      Head: Normocephalic and atraumatic. Right Ear: External ear normal. There is impacted cerumen. Left Ear: External ear normal. There is impacted cerumen. Nose: Nose normal.      Mouth/Throat:      Mouth: Mucous membranes are moist.      Pharynx: Oropharynx is clear. Eyes:      Extraocular Movements: Extraocular movements intact. Conjunctiva/sclera: Conjunctivae normal.   Cardiovascular:      Rate and Rhythm: Normal rate and regular rhythm. Heart sounds: Normal heart sounds. Pulmonary:      Effort: Pulmonary effort is normal.      Breath sounds: Normal breath sounds.    Musculoskeletal:         General: " Normal range of motion. Cervical back: Normal range of motion and neck supple. Skin:     General: Skin is warm and dry. Comments: Hypopigmented circular macule to center of forehead; depigmentation to elbows. Acanthosis to neck and axillae. Neurological:      General: No focal deficit present. Mental Status: He is alert. Psychiatric:         Mood and Affect: Mood normal.         IMPRESSION:  Will defer skin issues to dermatology  Doing well on metformin    ASSESSMENT:   Diagnoses and all orders for this visit:  Follow-up exam  Severe obesity due to excess calories without serious comorbidity with body mass index (BMI) greater than 99th percentile for age in pediatric patient (CMS/[facility])  -     metFORMIN (GLUCOPHAGE) 500 MG tablet; Take 1 tablet by mouth 2 times daily (with meals). Hyperinsulinemia  -     metFORMIN (GLUCOPHAGE) 500 MG tablet; Take 1 tablet by mouth 2 times daily (with meals).   Need for meningococcus vaccine  -     MENINGOCOCCAL CONJUGATE MCV4O VACC (Alhaji Castrejon)  Autism spectrum disorder  Anxiety disorder, unspecified type  Hypotonia  Acanthosis nigricans  Keratosis pilaris  Dermatitis, unspecified  Low HDL (under 40)      PLAN:  Labs to be obtained 7/2 prior to psychiatrist appointment  Continue present dose of metformin  F/U with derm for skin concerns  F/U sooner if needed  [address] [facility] Sunita VILLAGRAN CNP   5/4/2021   11:00 AM

## 2022-12-09 NOTE — ASSESSMENT & PLAN NOTE
Xray right tib/fib showed osteomyelitis at the distal aspect of the tibia and fibula amputation site. S/p revision on 12/8. Unfortunately no cultures or pathology taken at site at time of revision

## 2022-12-09 NOTE — PROGRESS NOTES
POD#1  Patient in bed on the phone. States she is feeling ok.   VSSAF    Right LE: Surgical dsg c/d/I.  Hct: 23.8    A/P: POD#1 Right BKA revision and I&D  1) leave dsg on for 3-4 more days  2) will cont to monitor H&H  3) discharge planning

## 2022-12-09 NOTE — NURSING
Pt refusing heparin injections. Education given on med and pt continues to refuse med. Will continue to monitor

## 2022-12-09 NOTE — PROGRESS NOTES
"[facility] Medicine  Progress Note    Patient Name: Pratik Torres  MRN: [id number]  Patient Class: IP- Inpatient   Admission Date: 12/5/2022  Length of Stay: 4 days  Attending Physician: Ernie Townsend MD  Primary Care Provider: Felipe Summers MD        Subjective:     Principal Problem:Bacterial infection due to Streptococcus, group G        HPI:  This is a 27 year old transgender female with a PMHx of T1DM c/b RLE cellulitis s/p right BKA, iron deficiency anemia, IBS-D who presents with fevers.     Patient reports developing fevers, chills, multiple episodes of non bloody emesis and decreased po intake 2 days prior to presentation. Associated symptoms include cough, and SOB that started a day prior. The patient has a chronic right stump wound and reports that he has "a bone infection" that she's following with wound care for. In the ED, he was febrile (39.5), tachycardic (140), tachypnic. Labs showed no leukocytosis (8.7), microcytic anemia (5.9), hyponatremia (128), elevated creatinine (2.1 baseline of 1.2), negative lactic acid (1.0), elevated procalcitonin (25.0), negative troponin (0.017). CT CAP showed multifocal patchy opacities seen within the lower lobes, left greater than right (aspiration versus infectious process). Xray right tib/fib showed osteomyelitis at the distal aspect of the tibia and fibula amputation site. The patient was given fluids, vancomycin, zosyn and was admitted for further management.       Overview/Hospital Course:  Patient admitted for osteo of R stump wound. ID consulted.  Patient had + blood cultures. Ortho consulted for I and D and stump revision planned for 12/8. Patient grew out strep G on blood cultures. ID started on Ceftriaxone.  Blood cultures repeated on 12/8. Echo without vegetations. Source likely dental.      Interval History: Pt upset with medical care this AM but refusing to elaborate further. Denies complaints in a perfunctory " manner.    Review of Systems   Constitutional:  Negative for chills and fever.   Respiratory:  Negative for cough and shortness of breath.    Cardiovascular:  Negative for chest pain and leg swelling.   Gastrointestinal:  Negative for abdominal distention and abdominal pain.   Objective:     Vital Signs (Most Recent):  Temp: 97.4 °F (36.3 °C) (12/09/22 1140)  Pulse: 99 (12/09/22 1140)  Resp: 18 (12/09/22 1140)  BP: 124/69 (12/09/22 1140)  SpO2: 100 % (12/09/22 1140) Vital Signs (24h Range):  Temp:  [96.8 °F (36 °C)-98.3 °F (36.8 °C)] 97.4 °F (36.3 °C)  Pulse:  [82-99] 99  Resp:  [17-20] 18  SpO2:  [95 %-100 %] 100 %  BP: (113-142)/(59-89) 124/69     Weight: 97.9 kg (215 lb 13.3 oz)  Body mass index is 29.27 kg/m².    Intake/Output Summary (Last 24 hours) at 12/9/2022 1301  Last data filed at 12/9/2022 0830  Gross per 24 hour   Intake 2280.42 ml   Output 1700 ml   Net 580.42 ml      Physical Exam  Constitutional:       General: She is not in acute distress.     Appearance: She is ill-appearing. She is not toxic-appearing or diaphoretic.   Cardiovascular:      Rate and Rhythm: Normal rate and regular rhythm.      Heart sounds: No murmur heard.    No gallop.   Pulmonary:      Effort: Pulmonary effort is normal. No respiratory distress.      Breath sounds: Normal breath sounds. No wheezing.   Abdominal:      General: Bowel sounds are normal. There is no distension.      Palpations: Abdomen is soft.      Tenderness: There is no abdominal tenderness.   Musculoskeletal:      Comments: Wound stump wrapped       Significant Labs: All pertinent labs within the past 24 hours have been reviewed.    Significant Imaging: I have reviewed all pertinent imaging results/findings within the past 24 hours.      Assessment/Plan:      * Bacterial infection due to Streptococcus, group G  Potential sources include wound stump, pneumonia, or dental caries.   - ID consulted  - most recent cx NGTD  - cont CTX, plan for six weeks of  treatment      Streptococcal bacteremia  Strep G bacteremia      Transgender woman on hormone therapy  Resume estrace     Osteomyelitis of tibia  Xray right tib/fib showed osteomyelitis at the distal aspect of the tibia and fibula amputation site. S/p revision on 12/8. Unfortunately no cultures or pathology taken at site at time of revision      Pneumonia  Presented with fevers, SOB, and cough. CT CAP showed multifocal patchy opacities seen within the lower lobes, left greater than right   - continue ceftriaxone    Sepsis  Sepsis physiology resolved. See osteomyelitis, pneumonia, and bacteremia.    THEE (acute kidney injury)  Creatinine appears to have peaked at 3.0, now downtrending, suggestive of ATN    Microcytic anemia  Unclear etiology   No signs/symptoms of bleeding, patient reports having prior similar episodes where transfusions were needed without any signs of bleeding   Transfuse to keep Hb > 7       Diarrhea  Likely related to IBS-D      Type 1 diabetes mellitus with hyperglycemia  Reduced insulin dosing due to hypoglycemia    Acute pancreatitis without infection or necrosis          VTE Risk Mitigation (From admission, onward)         Ordered     heparin (porcine) injection 5,000 Units  Every 8 hours         12/05/22 2215     IP VTE HIGH RISK PATIENT  Once         12/05/22 2215     Place sequential compression device  Until discontinued         12/05/22 2215                Discharge Planning   HUSSEIN: 12/13/2022     Code Status: Full Code   Is the patient medically ready for discharge?:     Reason for patient still in hospital (select all that apply): Patient trending condition, Laboratory test, Treatment, Consult recommendations and Pending disposition  Discharge Plan A: Home Health   Discharge Delays: None known at this time              Ernie Townsend MD  Department of Hospital Medicine   [facility] [place] [facility]

## 2022-12-09 NOTE — PLAN OF CARE
Problem: Occupational Therapy  Goal: Occupational Therapy Goal  Description: Goals to be met by: 12/23/22     Patient will increase functional independence with ADLs by performing:    LE Dressing with Modified [place].  Toileting from toilet with Modified [place] for hygiene and clothing management.   Step transfer with Modified [place]  Toilet transfer to toilet with Modified [place].  Upper extremity exercise program x15 reps per handout, with independence.    Outcome: Ongoing, Progressing     OT rec HHOT with caregiver assistance at d/c in order to increase safety and independence with ADLs and functional mobility.     Pt c/o L shoulder pain since she passed out just prior to admission. Nurse, Stefany, notified of pt's main complaint.

## 2022-12-09 NOTE — PLAN OF CARE
Problem: Impaired Wound Healing  Goal: Optimal Wound Healing  Intervention: Promote Wound Healing  Flowsheets (Taken 12/9/2022 1734)  Oral Nutrition Promotion: rest periods promoted  Sleep/Rest Enhancement: regular sleep/rest pattern promoted  Activity Management: Rolling - L1  Pain Management Interventions: relaxation techniques promoted

## 2022-12-09 NOTE — NURSING
End of shift report given to night nurse Adwoa. Patient appears to be asleep in bed. No distress noted.  Pt on RA, NS infusing @ 150 cc/hr.

## 2022-12-09 NOTE — NURSING
Report received from off going nurse, ALEXANDREA Orona. Patient AAO. No signs of distress noted. Call light in reach. Bed low and locked. Will continue plan of care.

## 2022-12-09 NOTE — PT/OT/SLP EVAL
Physical Therapy Evaluation    Patient Name:  Pratik Torres   MRN:  [id number]    Recommendations:     Discharge Recommendations: home health PT (with family assistance)   Discharge Equipment Recommendations: none   Barriers to discharge: None    Assessment:     Pratik Torres is a 27 y.o. adult admitted with a medical diagnosis of Sepsis.  She presents with the following impairments/functional limitations: weakness, impaired endurance, impaired sensation, impaired functional mobility, gait instability, impaired balance, decreased lower extremity function, decreased upper extremity function, decreased safety awareness, pain, decreased ROM, impaired skin, edema, orthopedic precautions.    Rehab Prognosis: Good; patient would benefit from acute skilled PT services to address these deficits and reach maximum level of function.    Recent Surgery: Procedure(s) (LRB):  AMPUTATION, BELOW KNEE I&D /REVISION SAW/PULSA VAC (Right) 1 Day Post-Op    Plan:     During this hospitalization, patient to be seen 6 x/week to address the identified rehab impairments via gait training, therapeutic activities, therapeutic exercises, wheelchair management/training and progress toward the following goals:    Plan of Care Expires:  12/23/22    Subjective     Chief Complaint: L shoulder pain  Patient/Family Comments/goals: Pt agreeable to PT eval.  Pt interested in  services.   Pain/Comfort:  Pain Rating 1:  (Pt with no pain to RLE, c/o L shoulder pain.)  Pain Addressed 1: Reposition, Distraction, Cessation of Activity, Nurse notified      Living Environment:  Pt lives with 5 cousins on the 1st floor apartment with no concerns.  Pt's mother is available for assistance.   Prior to admission, patients level of function was mod I with ambulation using RLE prosthesis prior to R stump wound.  Since R stump wound, pt has not been able to use RLE prothesis and has been hoping with RW or using w/c.  Pt has transportation set-up.  Equipment at home:  walker, rolling, wheelchair.  Upon discharge, patient will have assistance from family.    Objective:     Communicated with nurse Mccall prior to session.  Patient found HOB elevated with peripheral IV, telemetry  upon PT entry to room.    General Precautions: Standard, fall, diabetic  Orthopedic Precautions: (RLE NWB 2* BKA I+D and revision)   Braces: N/A  Respiratory Status: Room air    Exams:  Cognitive Exam:  Patient was able to follow multiple commands.   Gross Motor Coordination:  WFL  Postural Exam:  Patient presented with the following abnormalities:    -       No postural abnormalities identified  Sensation:    -       Intact  light/touch BLE; reported lack of sensation to R stump due to all the dressings  Skin Integrity/Edema:      -       Skin integrity: R stump dressings intact/dry  -       Edema: Mild RLE  BLE ROM: RLE WNL for hip flex, hip abd/add, knee flex/ext; LLE WNL  BLE Strength: WNL    Functional Mobility:  Bed Mobility:     Scooting: supervision  Supine to Sit: supervision  Transfers:     Sit to Stand: contact guard assistance with rolling walker  Bed to Chair: Pt declined to sit up in bedside chair, preferred to sit EOB.   Gait: Pt ambulated ~6 ft with CGA using RW, reported limited by L shoulder pain at this time.  Balance: Pt with fair dynamic standing balance.       AM-PAC 6 CLICK MOBILITY  Total Score:18       Treatment & Education:  Pt reported familiar with acute and home health PT services in the past.  Pt able to verbalize seated/supine LE therex (SLR, hip ad/add, knee flex/ext, and L AP) that pt was performing at home.  Pt encouraged to continue supine/seated LE therex while in the hospital.  Pt also encouraged to call nursing assistance for OOB activities, however reported that pt has been performing OOB>bedside commode transfers without nursing assistance since admit.  Pt verbalized good understanding of all teachings.     Patient left sitting edge of bed with all lines intact,  call button in reach, and nurse Stefany notified.  Tray table close by.     GOALS:   Multidisciplinary Problems       Physical Therapy Goals          Problem: Physical Therapy    Goal Priority Disciplines Outcome Goal Variances Interventions   Physical Therapy Goal     PT, PT/OT Ongoing, Progressing     Description: Goals to be met by: 22     Patient will increase functional independence with mobility by performin. Supine to sit with Modified [place]  2. Rolling to Left and Right with Modified [place]  3. Sit to stand transfer with Modified [place] using RW  4. Bed to chair transfer with Modified [place] using Rolling Walker  5. Gait x50 feet with Modified [place] using Rolling Walker   6. Wheelchair propulsion x150 feet with Modified [place] using bilateral upper extremities  7. Lower extremity exercise program 2 sets x15 reps per handout, with independence                         History:     Past Medical History:   Diagnosis Date    Diabetes mellitus     IBS (irritable bowel syndrome)        Past Surgical History:   Procedure Laterality Date    BELOW KNEE AMPUTATION OF LOWER EXTREMITY Right 2022    Procedure: AMPUTATION, BELOW KNEE I&D /REVISION SAW/PULSA VAC;  Surgeon: Henry Tolliver MD;  Location: [facility] OR;  Service: Orthopedics;  Laterality: Right;    COLONOSCOPY      INCISION OF PERIRECTAL ABSCESS N/A 6/10/2020    Procedure: INCISION, ABSCESS, PERIRECTAL;  Surgeon: Ronald Santo MD;  [facility];  Service: Colon and Rectal;  Laterality: N/A;  PATIENT IS COVID POSITIVE    TOE SURGERY      WOUND DEBRIDEMENT Right 6/10/2020    Procedure: DEBRIDEMENT, WOUND ISCHIORECTAL;  Surgeon: Ronald Santo MD;  Location: [facility] OR [facility];  Service: Colon and Rectal;  Laterality: Right;       Time Tracking:     PT Received On: 22  PT Start Time: 958     PT Stop Time: 4  PT Total Time (min): 16 min     Billable Minutes: Evaluation 16 min co-eval with  OT      12/09/2022

## 2022-12-09 NOTE — PLAN OF CARE
[place] - Telemetry  Discharge Reassessment    Primary Care Provider: Felipe Summers MD    Expected Discharge Date: 12/13/2022    Reassessment (most recent)       Discharge Reassessment - 12/09/22 1145          Discharge Reassessment    Assessment Type Discharge Planning Reassessment     Discharge Plan discussed with: Patient     Communicated HUSSEIN with patient/caregiver Yes     Discharge Plan A Home Health     Discharge Plan B Home Health     DME Needed Upon Discharge  other (see comments)   TBD    Discharge Barriers Identified None     Why the patient remains in the hospital Requires continued medical care        Post-Acute Status    Discharge Delays None known at this time                   SW met with patient at bedside. Patient stated he plans to return home and request for HH services at discharge. Patient's stated he had [facility] in the past and he is ok with using there HH services again.

## 2022-12-09 NOTE — ASSESSMENT & PLAN NOTE
Unclear etiology   No signs/symptoms of bleeding, patient reports having prior similar episodes where transfusions were needed without any signs of bleeding   Transfuse to keep Hb > 7

## 2022-12-09 NOTE — ASSESSMENT & PLAN NOTE
Potential sources include wound stump, pneumonia, or dental caries.   - ID consulted  - most recent cx NGTD  - cont CTX, plan for six weeks of treatment

## 2022-12-09 NOTE — ASSESSMENT & PLAN NOTE
Presented with fevers, SOB, and cough. CT CAP showed multifocal patchy opacities seen within the lower lobes, left greater than right   - continue ceftriaxone

## 2022-12-10 LAB
BACTERIA BLD CULT: ABNORMAL
BACTERIA BLD CULT: NORMAL
BASOPHILS # BLD AUTO: 0.01 K/UL (ref 0–0.2)
BASOPHILS NFR BLD: 0.1 % (ref 0–1.9)
BLD PROD TYP BPU: NORMAL
BLOOD UNIT EXPIRATION DATE: NORMAL
BLOOD UNIT TYPE CODE: 5100
BLOOD UNIT TYPE: NORMAL
CODING SYSTEM: NORMAL
DIFFERENTIAL METHOD: ABNORMAL
DISPENSE STATUS: NORMAL
EOSINOPHIL # BLD AUTO: 0.1 K/UL (ref 0–0.5)
EOSINOPHIL NFR BLD: 1.6 % (ref 0–8)
ERYTHROCYTE [DISTWIDTH] IN BLOOD BY AUTOMATED COUNT: 15.6 % (ref 11.5–14.5)
HCT VFR BLD AUTO: 20.6 % (ref 40–54)
HGB BLD-MCNC: 6.4 G/DL (ref 14–18)
IMM GRANULOCYTES # BLD AUTO: 0.05 K/UL (ref 0–0.04)
IMM GRANULOCYTES NFR BLD AUTO: 0.6 % (ref 0–0.5)
LYMPHOCYTES # BLD AUTO: 2.2 K/UL (ref 1–4.8)
LYMPHOCYTES NFR BLD: 24.8 % (ref 18–48)
MCH RBC QN AUTO: 24.3 PG (ref 27–31)
MCHC RBC AUTO-ENTMCNC: 31.1 G/DL (ref 32–36)
MCV RBC AUTO: 78 FL (ref 82–98)
MONOCYTES # BLD AUTO: 0.8 K/UL (ref 0.3–1)
MONOCYTES NFR BLD: 8.6 % (ref 4–15)
NEUTROPHILS # BLD AUTO: 5.8 K/UL (ref 1.8–7.7)
NEUTROPHILS NFR BLD: 64.3 % (ref 38–73)
NRBC BLD-RTO: 0 /100 WBC
PLATELET # BLD AUTO: 224 K/UL (ref 150–450)
PMV BLD AUTO: 10.2 FL (ref 9.2–12.9)
POCT GLUCOSE: 104 MG/DL (ref 70–110)
POCT GLUCOSE: 121 MG/DL (ref 70–110)
POCT GLUCOSE: 179 MG/DL (ref 70–110)
POCT GLUCOSE: 190 MG/DL (ref 70–110)
POCT GLUCOSE: 55 MG/DL (ref 70–110)
POCT GLUCOSE: 87 MG/DL (ref 70–110)
RBC # BLD AUTO: 2.63 M/UL (ref 4.6–6.2)
TRANS ERYTHROCYTES VOL PATIENT: NORMAL ML
WBC # BLD AUTO: 9.03 K/UL (ref 3.9–12.7)

## 2022-12-10 PROCEDURE — 94761 N-INVAS EAR/PLS OXIMETRY MLT: CPT

## 2022-12-10 PROCEDURE — 99900035 HC TECH TIME PER 15 MIN (STAT)

## 2022-12-10 PROCEDURE — 25000003 PHARM REV CODE 250: Performed by: ORTHOPAEDIC SURGERY

## 2022-12-10 PROCEDURE — 36430 TRANSFUSION BLD/BLD COMPNT: CPT

## 2022-12-10 PROCEDURE — P9021 RED BLOOD CELLS UNIT: HCPCS | Performed by: STUDENT IN AN ORGANIZED HEALTH CARE EDUCATION/TRAINING PROGRAM

## 2022-12-10 PROCEDURE — 21400001 HC TELEMETRY ROOM

## 2022-12-10 PROCEDURE — 85025 COMPLETE CBC W/AUTO DIFF WBC: CPT | Performed by: STUDENT IN AN ORGANIZED HEALTH CARE EDUCATION/TRAINING PROGRAM

## 2022-12-10 PROCEDURE — 25000003 PHARM REV CODE 250: Performed by: STUDENT IN AN ORGANIZED HEALTH CARE EDUCATION/TRAINING PROGRAM

## 2022-12-10 PROCEDURE — 63600175 PHARM REV CODE 636 W HCPCS: Performed by: ORTHOPAEDIC SURGERY

## 2022-12-10 PROCEDURE — 36415 COLL VENOUS BLD VENIPUNCTURE: CPT | Performed by: STUDENT IN AN ORGANIZED HEALTH CARE EDUCATION/TRAINING PROGRAM

## 2022-12-10 RX ORDER — DICLOFENAC SODIUM 10 MG/G
4 GEL TOPICAL 3 TIMES DAILY
Status: DISCONTINUED | OUTPATIENT
Start: 2022-12-10 | End: 2022-12-12 | Stop reason: HOSPADM

## 2022-12-10 RX ADMIN — DICLOFENAC SODIUM 4 G: 10 GEL TOPICAL at 10:12

## 2022-12-10 RX ADMIN — CEFTRIAXONE SODIUM 2 G: 2 INJECTION, SOLUTION INTRAVENOUS at 11:12

## 2022-12-10 RX ADMIN — SODIUM CHLORIDE: 0.9 INJECTION, SOLUTION INTRAVENOUS at 06:12

## 2022-12-10 RX ADMIN — Medication 16 G: at 05:12

## 2022-12-10 RX ADMIN — SPIRONOLACTONE 25 MG: 25 TABLET, FILM COATED ORAL at 08:12

## 2022-12-10 RX ADMIN — DICLOFENAC SODIUM 4 G: 10 GEL TOPICAL at 12:12

## 2022-12-10 RX ADMIN — ESTRADIOL 1 MG: 1 TABLET ORAL at 08:12

## 2022-12-10 RX ADMIN — INSULIN ASPART 10 UNITS: 100 INJECTION, SOLUTION INTRAVENOUS; SUBCUTANEOUS at 12:12

## 2022-12-10 NOTE — PLAN OF CARE
Problem: Adult Inpatient Plan of Care  Goal: Plan of Care Review  Outcome: Ongoing, Progressing     Problem: Adult Inpatient Plan of Care  Goal: Absence of Hospital-Acquired Illness or Injury  Outcome: Ongoing, Progressing     Problem: Skin Injury Risk Increased  Goal: Skin Health and Integrity  Outcome: Ongoing, Progressing

## 2022-12-10 NOTE — NURSING
Patient awake, alert, oriented resting comfortably. No signs of distress observed. bed low and locked. Call light in reach. Report given to oncoming nurse, ALEXANDREA Souza. IVF infusing. 12hour chart check complete. Will continue plan of care.

## 2022-12-10 NOTE — ASSESSMENT & PLAN NOTE
Chronic anemia of unknown etiology w/ expected postoperative anemia due to blood loss, inflammation, and hemodilution. No active bleeding noted  Transfuse to keep Hb > 7

## 2022-12-10 NOTE — NURSING
Blood transfusion complete.  No suspected reaction noted at the present time.  VSS.  No concerns voiced.  Call bell in reach.  Will continue to monitor.

## 2022-12-10 NOTE — NURSING
Called to patient's room, states she wants her glucose level checked.  CBG noted 87.  Patient given juice and has snacks at bedside.  No additional concerns voiced.

## 2022-12-10 NOTE — SUBJECTIVE & OBJECTIVE
Interval History: C/o L shoulder pain, otherwise without complaint    Review of Systems   Constitutional:  Negative for chills and fever.   Respiratory:  Negative for cough and shortness of breath.    Cardiovascular:  Negative for chest pain and leg swelling.   Gastrointestinal:  Negative for abdominal distention and abdominal pain.   Musculoskeletal:  Positive for arthralgias.   Objective:     Vital Signs (Most Recent):  Temp: 98.1 °F (36.7 °C) (12/10/22 1000)  Pulse: 95 (12/10/22 1050)  Resp: 20 (12/10/22 1050)  BP: 137/85 (12/10/22 1050)  SpO2: 100 % (12/10/22 1050) Vital Signs (24h Range):  Temp:  [97.4 °F (36.3 °C)-98.2 °F (36.8 °C)] 98.1 °F (36.7 °C)  Pulse:  [86-99] 95  Resp:  [18-20] 20  SpO2:  [97 %-100 %] 100 %  BP: (124-173)/() 137/85     Weight: 96.3 kg (212 lb 4.9 oz)  Body mass index is 28.79 kg/m².    Intake/Output Summary (Last 24 hours) at 12/10/2022 1115  Last data filed at 12/10/2022 0520  Gross per 24 hour   Intake 240 ml   Output 1000 ml   Net -760 ml      Physical Exam  Constitutional:       General: She is not in acute distress.     Appearance: She is ill-appearing. She is not toxic-appearing or diaphoretic.   Cardiovascular:      Rate and Rhythm: Normal rate and regular rhythm.      Heart sounds: No murmur heard.    No gallop.   Pulmonary:      Effort: Pulmonary effort is normal. No respiratory distress.      Breath sounds: Normal breath sounds. No wheezing.   Abdominal:      General: Bowel sounds are normal. There is no distension.      Palpations: Abdomen is soft.      Tenderness: There is no abdominal tenderness.   Musculoskeletal:         General: No swelling or deformity.      Comments: Pain with passive/active movement of R shoulder    R AKA wrapped       Significant Labs: All pertinent labs within the past 24 hours have been reviewed.    Significant Imaging: I have reviewed all pertinent imaging results/findings within the past 24 hours.

## 2022-12-10 NOTE — NURSING
Orders noted for 1UPRBC.  Consent verified on chart.  Second nurse verified orders and consent.  Armband valid, IV patent.  Educated patient on potential signs and symptoms in which to notify the nurse of.  Transfusion initiated, this nurse remains at the bedside.

## 2022-12-10 NOTE — PT/OT/SLP PROGRESS
Physical Therapy      Patient Name:  Pratik Torres   MRN:  [id number]    Patient not seen today secondary to Other (pt receiving blood transfusion ). Will follow-up as able later hour/day .  Second attempt, 1454 PM pt declined to participate in therapy , stated she just woke up and she is tired . Nurse Hooks notified.

## 2022-12-10 NOTE — PROGRESS NOTES
"[facility] Medicine  Progress Note    Patient Name: Pratik Torres  MRN: [id number]  Patient Class: IP- Inpatient   Admission Date: 12/5/2022  Length of Stay: 5 days  Attending Physician: Ernie Townsend MD  Primary Care Provider: Felipe Summers MD        Subjective:     Principal Problem:Bacterial infection due to Streptococcus, group G        HPI:  This is a 27 year old transgender female with a PMHx of T1DM c/b RLE cellulitis s/p right BKA, iron deficiency anemia, IBS-D who presents with fevers.     Patient reports developing fevers, chills, multiple episodes of non bloody emesis and decreased po intake 2 days prior to presentation. Associated symptoms include cough, and SOB that started a day prior. The patient has a chronic right stump wound and reports that he has "a bone infection" that she's following with wound care for. In the ED, he was febrile (39.5), tachycardic (140), tachypnic. Labs showed no leukocytosis (8.7), microcytic anemia (5.9), hyponatremia (128), elevated creatinine (2.1 baseline of 1.2), negative lactic acid (1.0), elevated procalcitonin (25.0), negative troponin (0.017). CT CAP showed multifocal patchy opacities seen within the lower lobes, left greater than right (aspiration versus infectious process). Xray right tib/fib showed osteomyelitis at the distal aspect of the tibia and fibula amputation site. The patient was given fluids, vancomycin, zosyn and was admitted for further management.       Overview/Hospital Course:  Patient admitted for osteo of R stump wound. ID consulted.  Patient had + blood cultures. Ortho consulted for I and D and stump revision planned for 12/8. Patient grew out strep G on blood cultures. ID started on Ceftriaxone.  Blood cultures repeated on 12/8. Echo without vegetations. Source likely dental.      Interval History: C/o L shoulder pain, otherwise without complaint    Review of Systems   Constitutional:  Negative for chills and fever. "   Respiratory:  Negative for cough and shortness of breath.    Cardiovascular:  Negative for chest pain and leg swelling.   Gastrointestinal:  Negative for abdominal distention and abdominal pain.   Musculoskeletal:  Positive for arthralgias.   Objective:     Vital Signs (Most Recent):  Temp: 98.1 °F (36.7 °C) (12/10/22 1000)  Pulse: 95 (12/10/22 1050)  Resp: 20 (12/10/22 1050)  BP: 137/85 (12/10/22 1050)  SpO2: 100 % (12/10/22 1050) Vital Signs (24h Range):  Temp:  [97.4 °F (36.3 °C)-98.2 °F (36.8 °C)] 98.1 °F (36.7 °C)  Pulse:  [86-99] 95  Resp:  [18-20] 20  SpO2:  [97 %-100 %] 100 %  BP: (124-173)/() 137/85     Weight: 96.3 kg (212 lb 4.9 oz)  Body mass index is 28.79 kg/m².    Intake/Output Summary (Last 24 hours) at 12/10/2022 1115  Last data filed at 12/10/2022 0520  Gross per 24 hour   Intake 240 ml   Output 1000 ml   Net -760 ml      Physical Exam  Constitutional:       General: She is not in acute distress.     Appearance: She is ill-appearing. She is not toxic-appearing or diaphoretic.   Cardiovascular:      Rate and Rhythm: Normal rate and regular rhythm.      Heart sounds: No murmur heard.    No gallop.   Pulmonary:      Effort: Pulmonary effort is normal. No respiratory distress.      Breath sounds: Normal breath sounds. No wheezing.   Abdominal:      General: Bowel sounds are normal. There is no distension.      Palpations: Abdomen is soft.      Tenderness: There is no abdominal tenderness.   Musculoskeletal:         General: No swelling or deformity.      Comments: Pain with passive/active movement of R shoulder    R AKA wrapped       Significant Labs: All pertinent labs within the past 24 hours have been reviewed.    Significant Imaging: I have reviewed all pertinent imaging results/findings within the past 24 hours.      Assessment/Plan:      * Bacterial infection due to Streptococcus, group G  Potential sources include wound stump, pneumonia, or dental caries. TTE without vegetation, pt  refuses KEL  - ID consulted  - most recent cx NGTD  - cont CTX, plan for six weeks of treatment      Streptococcal bacteremia  Strep G bacteremia      Transgender woman on hormone therapy  Resume estrace     Osteomyelitis of tibia  Xray right tib/fib showed osteomyelitis at the distal aspect of the tibia and fibula amputation site. S/p revision on 12/8. Unfortunately no cultures or pathology taken at site at time of revision      Pneumonia  Presented with fevers, SOB, and cough. CT CAP showed multifocal patchy opacities seen within the lower lobes, left greater than right   - continue ceftriaxone    Sepsis  Sepsis physiology resolved. See osteomyelitis, pneumonia, and bacteremia.    THEE (acute kidney injury)  Creatinine appears to have peaked at 3.0, now downtrending, suggestive of ATN  - trend BMP    Microcytic anemia  Chronic anemia of unknown etiology w/ expected postoperative anemia due to blood loss, inflammation, and hemodilution. No active bleeding noted  Transfuse to keep Hb > 7       Diarrhea  Likely related to IBS-D      Type 1 diabetes mellitus with hyperglycemia  Reduced insulin dosing due to hypoglycemia    Acute pancreatitis without infection or necrosis          VTE Risk Mitigation (From admission, onward)         Ordered     heparin (porcine) injection 5,000 Units  Every 8 hours         12/05/22 2215     IP VTE HIGH RISK PATIENT  Once         12/05/22 2215     Place sequential compression device  Until discontinued         12/05/22 2215                Discharge Planning   HUSSEIN: 12/13/2022     Code Status: Full Code   Is the patient medically ready for discharge?:     Reason for patient still in hospital (select all that apply): Patient trending condition, Laboratory test, Treatment, Consult recommendations and Pending disposition  Discharge Plan A: Home Health   Discharge Delays: None known at this time              Ernie Townsend MD  Department of Hospital Medicine   [facility] [place] [facility]

## 2022-12-10 NOTE — ASSESSMENT & PLAN NOTE
Potential sources include wound stump, pneumonia, or dental caries. TTE without vegetation, pt refuses KEL  - ID consulted  - most recent cx NGTD  - cont CTX, plan for six weeks of treatment

## 2022-12-10 NOTE — PROGRESS NOTES
POD#2  Patient sitting up in bed getting his bed sheets changed. No new complaints.   BPmax 173/109    Right stump: dressing c/d/I.   Hct: 20.6    A/P: POD#2 Right revision BKA  1) up with PT for transfers  2) receiving blood today  3) leave current dsg in place  4) dishcharge planning

## 2022-12-11 LAB
ANION GAP SERPL CALC-SCNC: 3 MMOL/L (ref 8–16)
BASOPHILS # BLD AUTO: 0.04 K/UL (ref 0–0.2)
BASOPHILS NFR BLD: 0.4 % (ref 0–1.9)
BUN SERPL-MCNC: 18 MG/DL (ref 6–20)
CALCIUM SERPL-MCNC: 8.7 MG/DL (ref 8.7–10.5)
CHLORIDE SERPL-SCNC: 112 MMOL/L (ref 95–110)
CO2 SERPL-SCNC: 19 MMOL/L (ref 23–29)
CREAT SERPL-MCNC: 2 MG/DL (ref 0.5–1.4)
DIFFERENTIAL METHOD: ABNORMAL
EOSINOPHIL # BLD AUTO: 0.2 K/UL (ref 0–0.5)
EOSINOPHIL NFR BLD: 2.3 % (ref 0–8)
ERYTHROCYTE [DISTWIDTH] IN BLOOD BY AUTOMATED COUNT: 15.2 % (ref 11.5–14.5)
EST. GFR  (NO RACE VARIABLE): 46 ML/MIN/1.73 M^2
GLUCOSE SERPL-MCNC: 146 MG/DL (ref 70–110)
HCT VFR BLD AUTO: 24 % (ref 40–54)
HGB BLD-MCNC: 7.5 G/DL (ref 14–18)
IMM GRANULOCYTES # BLD AUTO: 0.07 K/UL (ref 0–0.04)
IMM GRANULOCYTES NFR BLD AUTO: 0.7 % (ref 0–0.5)
LYMPHOCYTES # BLD AUTO: 2.3 K/UL (ref 1–4.8)
LYMPHOCYTES NFR BLD: 24 % (ref 18–48)
MCH RBC QN AUTO: 24.8 PG (ref 27–31)
MCHC RBC AUTO-ENTMCNC: 31.3 G/DL (ref 32–36)
MCV RBC AUTO: 80 FL (ref 82–98)
MONOCYTES # BLD AUTO: 0.7 K/UL (ref 0.3–1)
MONOCYTES NFR BLD: 7.1 % (ref 4–15)
NEUTROPHILS # BLD AUTO: 6.2 K/UL (ref 1.8–7.7)
NEUTROPHILS NFR BLD: 65.5 % (ref 38–73)
NRBC BLD-RTO: 0 /100 WBC
PLATELET # BLD AUTO: 271 K/UL (ref 150–450)
PMV BLD AUTO: 9.9 FL (ref 9.2–12.9)
POCT GLUCOSE: 121 MG/DL (ref 70–110)
POCT GLUCOSE: 152 MG/DL (ref 70–110)
POCT GLUCOSE: 154 MG/DL (ref 70–110)
POCT GLUCOSE: 192 MG/DL (ref 70–110)
POTASSIUM SERPL-SCNC: 5.2 MMOL/L (ref 3.5–5.1)
RBC # BLD AUTO: 3.02 M/UL (ref 4.6–6.2)
SODIUM SERPL-SCNC: 134 MMOL/L (ref 136–145)
WBC # BLD AUTO: 9.49 K/UL (ref 3.9–12.7)

## 2022-12-11 PROCEDURE — 21400001 HC TELEMETRY ROOM

## 2022-12-11 PROCEDURE — 99900035 HC TECH TIME PER 15 MIN (STAT)

## 2022-12-11 PROCEDURE — 80048 BASIC METABOLIC PNL TOTAL CA: CPT | Performed by: STUDENT IN AN ORGANIZED HEALTH CARE EDUCATION/TRAINING PROGRAM

## 2022-12-11 PROCEDURE — 25000003 PHARM REV CODE 250: Performed by: ORTHOPAEDIC SURGERY

## 2022-12-11 PROCEDURE — 97530 THERAPEUTIC ACTIVITIES: CPT

## 2022-12-11 PROCEDURE — 85025 COMPLETE CBC W/AUTO DIFF WBC: CPT | Performed by: STUDENT IN AN ORGANIZED HEALTH CARE EDUCATION/TRAINING PROGRAM

## 2022-12-11 PROCEDURE — 63600175 PHARM REV CODE 636 W HCPCS: Performed by: ORTHOPAEDIC SURGERY

## 2022-12-11 PROCEDURE — 36415 COLL VENOUS BLD VENIPUNCTURE: CPT | Performed by: STUDENT IN AN ORGANIZED HEALTH CARE EDUCATION/TRAINING PROGRAM

## 2022-12-11 PROCEDURE — 25000003 PHARM REV CODE 250: Performed by: STUDENT IN AN ORGANIZED HEALTH CARE EDUCATION/TRAINING PROGRAM

## 2022-12-11 RX ORDER — HYDRALAZINE HYDROCHLORIDE 25 MG/1
25 TABLET, FILM COATED ORAL EVERY 6 HOURS PRN
Status: DISCONTINUED | OUTPATIENT
Start: 2022-12-11 | End: 2022-12-12 | Stop reason: HOSPADM

## 2022-12-11 RX ADMIN — CEFTRIAXONE SODIUM 2 G: 2 INJECTION, SOLUTION INTRAVENOUS at 11:12

## 2022-12-11 RX ADMIN — INSULIN ASPART 10 UNITS: 100 INJECTION, SOLUTION INTRAVENOUS; SUBCUTANEOUS at 07:12

## 2022-12-11 RX ADMIN — INSULIN ASPART 10 UNITS: 100 INJECTION, SOLUTION INTRAVENOUS; SUBCUTANEOUS at 12:12

## 2022-12-11 RX ADMIN — SPIRONOLACTONE 25 MG: 25 TABLET, FILM COATED ORAL at 08:12

## 2022-12-11 RX ADMIN — DICLOFENAC SODIUM 4 G: 10 GEL TOPICAL at 09:12

## 2022-12-11 RX ADMIN — ESTRADIOL 1 MG: 1 TABLET ORAL at 08:12

## 2022-12-11 RX ADMIN — HYDRALAZINE HYDROCHLORIDE 25 MG: 25 TABLET ORAL at 07:12

## 2022-12-11 NOTE — PROGRESS NOTES
"[facility] Medicine  Progress Note    Patient Name: Pratik Torres  MRN: [id number]  Patient Class: IP- Inpatient   Admission Date: 12/5/2022  Length of Stay: 6 days  Attending Physician: Ernie Townsend MD  Primary Care Provider: Felipe Summers MD        Subjective:     Principal Problem:Bacterial infection due to Streptococcus, group G        HPI:  This is a 27 year old transgender female with a PMHx of T1DM c/b RLE cellulitis s/p right BKA, iron deficiency anemia, IBS-D who presents with fevers.     Patient reports developing fevers, chills, multiple episodes of non bloody emesis and decreased po intake 2 days prior to presentation. Associated symptoms include cough, and SOB that started a day prior. The patient has a chronic right stump wound and reports that he has "a bone infection" that she's following with wound care for. In the ED, he was febrile (39.5), tachycardic (140), tachypnic. Labs showed no leukocytosis (8.7), microcytic anemia (5.9), hyponatremia (128), elevated creatinine (2.1 baseline of 1.2), negative lactic acid (1.0), elevated procalcitonin (25.0), negative troponin (0.017). CT CAP showed multifocal patchy opacities seen within the lower lobes, left greater than right (aspiration versus infectious process). Xray right tib/fib showed osteomyelitis at the distal aspect of the tibia and fibula amputation site. The patient was given fluids, vancomycin, zosyn and was admitted for further management.       Overview/Hospital Course:  Patient admitted for osteo of R stump wound. ID consulted.  Patient had + blood cultures. Ortho consulted for I and D and stump revision planned for 12/8. Patient grew out strep G on blood cultures. ID started on Ceftriaxone.  Blood cultures repeated on 12/8. Echo without vegetations. Source likely dental.      Interval History: No complaints this AM, shoulder pain improved    Review of Systems   Constitutional:  Negative for chills and fever. "   Respiratory:  Negative for cough and shortness of breath.    Cardiovascular:  Negative for chest pain and leg swelling.   Gastrointestinal:  Negative for abdominal distention and abdominal pain.   Musculoskeletal:  Negative for arthralgias.   Objective:     Vital Signs (Most Recent):  Temp: 98.3 °F (36.8 °C) (12/11/22 0739)  Pulse: 86 (12/11/22 0739)  Resp: 18 (12/11/22 0739)  BP: (!) 172/102 (12/11/22 0739)  SpO2: 100 % (12/11/22 0739) Vital Signs (24h Range):  Temp:  [97.9 °F (36.6 °C)-98.3 °F (36.8 °C)] 98.3 °F (36.8 °C)  Pulse:  [79-97] 86  Resp:  [18-20] 18  SpO2:  [97 %-100 %] 100 %  BP: (137-182)/() 172/102     Weight: 93.6 kg (206 lb 5.6 oz)  Body mass index is 27.99 kg/m².    Intake/Output Summary (Last 24 hours) at 12/11/2022 0741  Last data filed at 12/11/2022 0540  Gross per 24 hour   Intake 810.42 ml   Output 1500 ml   Net -689.58 ml        Physical Exam  Constitutional:       General: She is not in acute distress.     Appearance: She is ill-appearing. She is not toxic-appearing or diaphoretic.   Cardiovascular:      Rate and Rhythm: Normal rate and regular rhythm.      Heart sounds: No murmur heard.    No gallop.   Pulmonary:      Effort: Pulmonary effort is normal. No respiratory distress.      Breath sounds: Normal breath sounds. No wheezing.   Abdominal:      General: Bowel sounds are normal. There is no distension.      Palpations: Abdomen is soft.      Tenderness: There is no abdominal tenderness.   Musculoskeletal:         General: No swelling or deformity.      Comments: RABIA MCKOY wrapped       Significant Labs: All pertinent labs within the past 24 hours have been reviewed.    Significant Imaging: I have reviewed all pertinent imaging results/findings within the past 24 hours.      Assessment/Plan:      * Bacterial infection due to Streptococcus, group G  Potential sources include wound stump, pneumonia, or dental caries. TTE without vegetation, pt refuses KEL  - ID consulted  - most recent  cx NGTD  - cont CTX, plan for six weeks of treatment      Streptococcal bacteremia  Strep G bacteremia      Transgender woman on hormone therapy  Resume estrace     Osteomyelitis of tibia  Xray right tib/fib showed osteomyelitis at the distal aspect of the tibia and fibula amputation site. S/p revision on 12/8. Unfortunately no cultures or pathology taken at site at time of revision      Pneumonia  Presented with fevers, SOB, and cough. CT CAP showed multifocal patchy opacities seen within the lower lobes, left greater than right   - continue ceftriaxone    Sepsis  Sepsis physiology resolved. See osteomyelitis, pneumonia, and bacteremia.    THEE (acute kidney injury)  Creatinine appears to have peaked at 3.0, now downtrending, suggestive of ATN. Unclear baseline, was 1.2 in 2020 but no values on record since then  - trend BMP    Microcytic anemia  Chronic anemia of unknown etiology w/ expected postoperative anemia due to blood loss, inflammation, and hemodilution. No active bleeding noted  Transfuse to keep Hb > 7       Diarrhea  Likely related to IBS-D      Type 1 diabetes mellitus with hyperglycemia  Reduced insulin dosing due to hypoglycemia    Acute pancreatitis without infection or necrosis        VTE Risk Mitigation (From admission, onward)           Ordered     heparin (porcine) injection 5,000 Units  Every 8 hours         12/05/22 2215     IP VTE HIGH RISK PATIENT  Once         12/05/22 2215     Place sequential compression device  Until discontinued         12/05/22 2215                    Discharge Planning   HUSSEIN: 12/13/2022     Code Status: Full Code   Is the patient medically ready for discharge?:     Reason for patient still in hospital (select all that apply): Patient trending condition, Laboratory test, Treatment, Consult recommendations and Pending disposition  Discharge Plan A: Home Health   Discharge Delays: None known at this time              Ernie Townsend MD  Department of Hospital Medicine    [facility] [place] - Telemetry

## 2022-12-11 NOTE — NURSING
Report given to on coming Nurse Jessee . Pt awake sitting up eating . Pt refused All meds last night and this am . Has no complaints of at this time . Bed down SR up x 2 HOB . Call bell in reach

## 2022-12-11 NOTE — SUBJECTIVE & OBJECTIVE
Interval History: No complaints this AM, shoulder pain improved    Review of Systems   Constitutional:  Negative for chills and fever.   Respiratory:  Negative for cough and shortness of breath.    Cardiovascular:  Negative for chest pain and leg swelling.   Gastrointestinal:  Negative for abdominal distention and abdominal pain.   Musculoskeletal:  Negative for arthralgias.   Objective:     Vital Signs (Most Recent):  Temp: 98.3 °F (36.8 °C) (12/11/22 0739)  Pulse: 86 (12/11/22 0739)  Resp: 18 (12/11/22 0739)  BP: (!) 172/102 (12/11/22 0739)  SpO2: 100 % (12/11/22 0739) Vital Signs (24h Range):  Temp:  [97.9 °F (36.6 °C)-98.3 °F (36.8 °C)] 98.3 °F (36.8 °C)  Pulse:  [79-97] 86  Resp:  [18-20] 18  SpO2:  [97 %-100 %] 100 %  BP: (137-182)/() 172/102     Weight: 93.6 kg (206 lb 5.6 oz)  Body mass index is 27.99 kg/m².    Intake/Output Summary (Last 24 hours) at 12/11/2022 0741  Last data filed at 12/11/2022 0540  Gross per 24 hour   Intake 810.42 ml   Output 1500 ml   Net -689.58 ml        Physical Exam  Constitutional:       General: She is not in acute distress.     Appearance: She is ill-appearing. She is not toxic-appearing or diaphoretic.   Cardiovascular:      Rate and Rhythm: Normal rate and regular rhythm.      Heart sounds: No murmur heard.    No gallop.   Pulmonary:      Effort: Pulmonary effort is normal. No respiratory distress.      Breath sounds: Normal breath sounds. No wheezing.   Abdominal:      General: Bowel sounds are normal. There is no distension.      Palpations: Abdomen is soft.      Tenderness: There is no abdominal tenderness.   Musculoskeletal:         General: No swelling or deformity.      Comments: R BKA wrapped       Significant Labs: All pertinent labs within the past 24 hours have been reviewed.    Significant Imaging: I have reviewed all pertinent imaging results/findings within the past 24 hours.

## 2022-12-11 NOTE — PT/OT/SLP PROGRESS
Occupational Therapy   Treatment    Name: Pratik Torres  MRN: [id number]  Admitting Diagnosis:  Bacterial infection due to Streptococcus, group G  3 Days Post-Op    Recommendations:     Discharge Recommendations: home (with family support)  Discharge Equipment Recommendations:  none  Barriers to discharge:  None    Assessment:     Pratik Torres is a 27 y.o. adult with a medical diagnosis of Bacterial infection due to Streptococcus, group G. Performance deficits affecting function are weakness, impaired skin, impaired functional mobility, orthopedic precautions, edema, decreased lower extremity function, impaired balance, impaired endurance, decreased ROM.     No LUE pain with ambulating. 5/10 reported fatigue after ambulating ~120 ft using RW with SBA-CGA    Rehab Prognosis:  Good; patient would benefit from acute skilled OT services to address these deficits and reach maximum level of function.       Plan:     Patient to be seen 3 x/week to address the above listed problems via self-care/home management, therapeutic activities, therapeutic exercises  Plan of Care Expires: 12/23/22  Plan of Care Reviewed with: patient    Subjective     Chief complaint: taking an uber whenever ready for d/c so she doesn't know how she will get in the home because no one can bring her RW to the hospital (OT left a secure message for SW with concern)  Patient/family comments/ goals: eager to walk; feels good to be out of that small room    Pain/Comfort:  Pain Rating 1: 0/10    Objective:     Communicated with: nurseJessee, during session.  Patient found sitting edge of bed with peripheral IV upon OT entry to room.    General Precautions: Standard, fall, diabetic, vision impaired    Orthopedic Precautions: (RLE NWB 2* BKA I+D and revision)  Braces: N/A  Respiratory Status: Room air     Occupational Performance:     Bed Mobility:    Patient completed Scooting/Bridging with modified independence     Functional Mobility/Transfers:  Patient  completed Sit <> Stand Transfer with stand by assistance  with  rolling walker   Functional Mobility: Gait belt donned prior to transfer for safety during mobility/transfers. Pt completed ~120 ft using RW with SBA-CGA    Activities of Daily Living:  Grooming: modified independence doffing then doffing wig seated EOB   Upper Body Dressing: modified independence seated EOB to change gowns and don sweatshirt and face mask       AMPAC 6 Click ADL: 24    Treatment & Education:  Pt re-educated on OT role/POC.   Importance of OOB activity with staff supervision.  Safety during functional t/f and mobility   Self-care tasks/functional mobility completed- assistance level noted above   All questions/concerns answered within OT scope of practice       Patient left sitting edge of bed with all lines intact, call button in reach, nurse, Jessee, notified, and all needs met/within reach    GOALS:   Multidisciplinary Problems       Occupational Therapy Goals          Problem: Occupational Therapy    Goal Priority Disciplines Outcome Interventions   Occupational Therapy Goal     OT, PT/OT Ongoing, Progressing    Description: Goals to be met by: 12/23/22     Patient will increase functional independence with ADLs by performing:    LE Dressing with Modified [place].  Toileting from toilet with Modified [place] for hygiene and clothing management.   Step transfer with Modified [place]  Toilet transfer to toilet with Modified [place].  Upper extremity exercise program x15 reps per handout, with independence.                         Time Tracking:     OT Date of Treatment: 12/11/22  OT Start Time: 1532  OT Stop Time: 1547  OT Total Time (min): 15 min    Billable Minutes:Therapeutic Activity 15 min    OT/GARY: OT     GARY Visit Number: 0    12/11/2022

## 2022-12-11 NOTE — NURSING
The pt refused for his MN Vital signs to be taken . I tried to explain to the pt that his BP was elevated at 8pm and I would like to recheck it but he still refused .

## 2022-12-11 NOTE — PLAN OF CARE
Problem: Occupational Therapy  Goal: Occupational Therapy Goal  Description: Goals to be met by: 12/23/22     Patient will increase functional independence with ADLs by performing:    LE Dressing with Modified [place].  Toileting from toilet with Modified [place] for hygiene and clothing management.   Step transfer with Modified [place]  Toilet transfer to toilet with Modified [place].  Upper extremity exercise program x15 reps per handout, with independence.    Outcome: Ongoing, Progressing     No LUE pain with ambulating. 5/10 reported fatigue after ambulating ~120 ft using RW.

## 2022-12-11 NOTE — ASSESSMENT & PLAN NOTE
Creatinine appears to have peaked at 3.0, now downtrending, suggestive of ATN. Unclear baseline, was 1.2 in 2020 but no values on record since then  - trend BMP

## 2022-12-11 NOTE — PROGRESS NOTES
12/11/22 0739   Vital Signs   Temp 98.3 °F (36.8 °C)   Temp src Oral   Pulse 86   Heart Rate Source Monitor   Resp 18   SpO2 100 %   BP (!) 172/102   MAP (mmHg) 130   BP Location Left arm   Patient Position Sitting   Assessments (Pre/Post)   Level of Consciousness (AVPU) alert     New orders noted for hydralazine 25mg po prn.  Administered for elevated BP and will continue to monitor.  Patient denies LONG or chest pain at the present time.  Call bell in reach.

## 2022-12-12 VITALS
SYSTOLIC BLOOD PRESSURE: 151 MMHG | HEIGHT: 72 IN | OXYGEN SATURATION: 100 % | DIASTOLIC BLOOD PRESSURE: 90 MMHG | BODY MASS INDEX: 27.95 KG/M2 | RESPIRATION RATE: 18 BRPM | WEIGHT: 206.38 LBS | TEMPERATURE: 99 F | HEART RATE: 98 BPM

## 2022-12-12 LAB
ANION GAP SERPL CALC-SCNC: 4 MMOL/L (ref 8–16)
ANION GAP SERPL CALC-SCNC: 6 MMOL/L (ref 8–16)
BACTERIA BLD CULT: NORMAL
BACTERIA BLD CULT: NORMAL
BASOPHILS # BLD AUTO: 0.04 K/UL (ref 0–0.2)
BASOPHILS NFR BLD: 0.4 % (ref 0–1.9)
BUN SERPL-MCNC: 17 MG/DL (ref 6–20)
BUN SERPL-MCNC: 17 MG/DL (ref 6–20)
CALCIUM SERPL-MCNC: 8.5 MG/DL (ref 8.7–10.5)
CALCIUM SERPL-MCNC: 9 MG/DL (ref 8.7–10.5)
CHLORIDE SERPL-SCNC: 112 MMOL/L (ref 95–110)
CHLORIDE SERPL-SCNC: 112 MMOL/L (ref 95–110)
CO2 SERPL-SCNC: 20 MMOL/L (ref 23–29)
CO2 SERPL-SCNC: 21 MMOL/L (ref 23–29)
CREAT SERPL-MCNC: 1.8 MG/DL (ref 0.5–1.4)
CREAT SERPL-MCNC: 1.8 MG/DL (ref 0.5–1.4)
DIFFERENTIAL METHOD: ABNORMAL
EOSINOPHIL # BLD AUTO: 0.2 K/UL (ref 0–0.5)
EOSINOPHIL NFR BLD: 2.2 % (ref 0–8)
ERYTHROCYTE [DISTWIDTH] IN BLOOD BY AUTOMATED COUNT: 15.7 % (ref 11.5–14.5)
EST. GFR  (NO RACE VARIABLE): 52 ML/MIN/1.73 M^2
EST. GFR  (NO RACE VARIABLE): 52 ML/MIN/1.73 M^2
GLUCOSE SERPL-MCNC: 138 MG/DL (ref 70–110)
GLUCOSE SERPL-MCNC: 93 MG/DL (ref 70–110)
HCT VFR BLD AUTO: 23.8 % (ref 40–54)
HGB BLD-MCNC: 7.4 G/DL (ref 14–18)
IMM GRANULOCYTES # BLD AUTO: 0.1 K/UL (ref 0–0.04)
IMM GRANULOCYTES NFR BLD AUTO: 1.1 % (ref 0–0.5)
LYMPHOCYTES # BLD AUTO: 2.4 K/UL (ref 1–4.8)
LYMPHOCYTES NFR BLD: 25.4 % (ref 18–48)
MCH RBC QN AUTO: 24.6 PG (ref 27–31)
MCHC RBC AUTO-ENTMCNC: 31.1 G/DL (ref 32–36)
MCV RBC AUTO: 79 FL (ref 82–98)
MONOCYTES # BLD AUTO: 0.8 K/UL (ref 0.3–1)
MONOCYTES NFR BLD: 8.2 % (ref 4–15)
NEUTROPHILS # BLD AUTO: 5.9 K/UL (ref 1.8–7.7)
NEUTROPHILS NFR BLD: 62.7 % (ref 38–73)
NRBC BLD-RTO: 0 /100 WBC
PLATELET # BLD AUTO: 306 K/UL (ref 150–450)
PMV BLD AUTO: 9.8 FL (ref 9.2–12.9)
POCT GLUCOSE: 109 MG/DL (ref 70–110)
POCT GLUCOSE: 168 MG/DL (ref 70–110)
POTASSIUM SERPL-SCNC: 5.1 MMOL/L (ref 3.5–5.1)
POTASSIUM SERPL-SCNC: 5.6 MMOL/L (ref 3.5–5.1)
RBC # BLD AUTO: 3.01 M/UL (ref 4.6–6.2)
SODIUM SERPL-SCNC: 136 MMOL/L (ref 136–145)
SODIUM SERPL-SCNC: 139 MMOL/L (ref 136–145)
WBC # BLD AUTO: 9.36 K/UL (ref 3.9–12.7)

## 2022-12-12 PROCEDURE — 25000003 PHARM REV CODE 250: Performed by: STUDENT IN AN ORGANIZED HEALTH CARE EDUCATION/TRAINING PROGRAM

## 2022-12-12 PROCEDURE — 36415 COLL VENOUS BLD VENIPUNCTURE: CPT | Performed by: STUDENT IN AN ORGANIZED HEALTH CARE EDUCATION/TRAINING PROGRAM

## 2022-12-12 PROCEDURE — A4216 STERILE WATER/SALINE, 10 ML: HCPCS | Performed by: STUDENT IN AN ORGANIZED HEALTH CARE EDUCATION/TRAINING PROGRAM

## 2022-12-12 PROCEDURE — 36569 INSJ PICC 5 YR+ W/O IMAGING: CPT

## 2022-12-12 PROCEDURE — 99900035 HC TECH TIME PER 15 MIN (STAT)

## 2022-12-12 PROCEDURE — 25000003 PHARM REV CODE 250: Performed by: ORTHOPAEDIC SURGERY

## 2022-12-12 PROCEDURE — C1751 CATH, INF, PER/CENT/MIDLINE: HCPCS

## 2022-12-12 PROCEDURE — 97530 THERAPEUTIC ACTIVITIES: CPT

## 2022-12-12 PROCEDURE — 63600175 PHARM REV CODE 636 W HCPCS: Performed by: STUDENT IN AN ORGANIZED HEALTH CARE EDUCATION/TRAINING PROGRAM

## 2022-12-12 PROCEDURE — 97116 GAIT TRAINING THERAPY: CPT

## 2022-12-12 PROCEDURE — 85025 COMPLETE CBC W/AUTO DIFF WBC: CPT | Performed by: STUDENT IN AN ORGANIZED HEALTH CARE EDUCATION/TRAINING PROGRAM

## 2022-12-12 PROCEDURE — 80048 BASIC METABOLIC PNL TOTAL CA: CPT | Mod: 91 | Performed by: STUDENT IN AN ORGANIZED HEALTH CARE EDUCATION/TRAINING PROGRAM

## 2022-12-12 RX ORDER — CEFTRIAXONE 2 G/50ML
2 INJECTION, SOLUTION INTRAVENOUS ONCE
Status: COMPLETED | OUTPATIENT
Start: 2022-12-12 | End: 2022-12-12

## 2022-12-12 RX ORDER — LOPERAMIDE HYDROCHLORIDE 2 MG/1
2 CAPSULE ORAL 4 TIMES DAILY PRN
Status: DISCONTINUED | OUTPATIENT
Start: 2022-12-12 | End: 2022-12-12 | Stop reason: HOSPADM

## 2022-12-12 RX ORDER — CEFTRIAXONE 2 G/50ML
2 INJECTION, SOLUTION INTRAVENOUS DAILY
Start: 2022-12-12

## 2022-12-12 RX ORDER — SODIUM CHLORIDE 0.9 % (FLUSH) 0.9 %
10 SYRINGE (ML) INJECTION EVERY 6 HOURS
Status: DISCONTINUED | OUTPATIENT
Start: 2022-12-12 | End: 2022-12-12 | Stop reason: HOSPADM

## 2022-12-12 RX ORDER — SODIUM CHLORIDE 0.9 % (FLUSH) 0.9 %
10 SYRINGE (ML) INJECTION
Status: DISCONTINUED | OUTPATIENT
Start: 2022-12-12 | End: 2022-12-12 | Stop reason: HOSPADM

## 2022-12-12 RX ADMIN — Medication 10 ML: at 05:12

## 2022-12-12 RX ADMIN — CEFTRIAXONE SODIUM 2 G: 2 INJECTION, SOLUTION INTRAVENOUS at 04:12

## 2022-12-12 RX ADMIN — INSULIN ASPART 10 UNITS: 100 INJECTION, SOLUTION INTRAVENOUS; SUBCUTANEOUS at 12:12

## 2022-12-12 RX ADMIN — ESTRADIOL 1 MG: 1 TABLET ORAL at 08:12

## 2022-12-12 RX ADMIN — SODIUM ZIRCONIUM CYCLOSILICATE 10 G: 10 POWDER, FOR SUSPENSION ORAL at 03:12

## 2022-12-12 RX ADMIN — INSULIN ASPART 10 UNITS: 100 INJECTION, SOLUTION INTRAVENOUS; SUBCUTANEOUS at 08:12

## 2022-12-12 NOTE — PLAN OF CARE
Problem: Physical Therapy  Goal: Physical Therapy Goal  Description: Goals to be met by: 22     Patient will increase functional independence with mobility by performin. Supine to sit with Modified [place]  2. Rolling to Left and Right with Modified [place]  3. Sit to stand transfer with Modified [place] using RW  4. Bed to chair transfer with Modified [place] using Rolling Walker  5. Gait x50 feet with Modified [place] using Rolling Walker   6. Wheelchair propulsion x150 feet with Modified [place] using bilateral upper extremities  7. Lower extremity exercise program 2 sets x15 reps per handout, with independence    Outcome: Ongoing, Progressing     Pt ambulated ~100 ft with CGA using RW, R BKA.  Pt with decreased endurance.

## 2022-12-12 NOTE — PLAN OF CARE
[facility]      HOME HEALTH ORDERS  FACE TO FACE ENCOUNTER    Patient Name: Pratik Torres  YOB: 1995    PCP: Felipe Summers MD   PCP Address: [address] [place] [address] Abbi PATEL [facility] / *  PCP Phone Number: [phone]  PCP Fax: [phone]    Encounter Date: 12/5/22    Admit to Home Health    Diagnoses:  Active Hospital Problems    Diagnosis  POA    *Bacterial infection due to Streptococcus, group G [B95.4]  Yes    Streptococcal bacteremia [R78.81, B95.5]  Yes    Sepsis [A41.9]  Yes    Pneumonia [J18.9]  Yes    Osteomyelitis of tibia [M86.9]  Yes    Transgender woman on hormone therapy [F64.0, Z79.899]  Not Applicable    THEE (acute kidney injury) [N17.9]  Yes    Microcytic anemia [D50.9]  Yes    Diarrhea [R19.7]  Yes    Type 1 diabetes mellitus with hyperglycemia [E10.65]  Yes      Resolved Hospital Problems   No resolved problems to display.       Follow Up Appointments:  Future Appointments   Date Time Provider Department Center   1/4/2023 10:00 AM INFECTIOUS DISEASE, [facility] IN[facility]       Allergies:  Review of patient's allergies indicates:   Allergen Reactions    Shrimp        Medications: Review discharge medications with patient and family and provide education.    Current Facility-Administered Medications   Medication Dose Route Frequency Provider Last Rate Last Admin    0.9%  NaCl infusion (for blood administration)   Intravenous Q24H PRN Henry Tolliver MD        0.9%  NaCl infusion (for blood administration)   Intravenous Q24H PRN Henry Tolliver MD        acetaminophen tablet 650 mg  650 mg Oral Q8H PRN Henry Tolliver MD        acetaminophen tablet 650 mg  650 mg Oral Q4H PRN Henry Tolliver MD        albuterol-ipratropium 2.5 mg-0.5 mg/3 mL nebulizer solution 3 mL  3 mL Nebulization Q4H PRN Henry Tolliver MD        aluminum-magnesium hydroxide-simethicone 200-200-20 mg/5 mL suspension 30 mL  30 mL Oral QID PRN Henry Tolliver MD         bisacodyL suppository 10 mg  10 mg Rectal Daily PRN Henry Tolliver MD        cefTRIAXone 2 gram/50 mL IVPB 2 g  2 g Intravenous Q24H Henry Tolliver MD   Stopped at 12/11/22 2330    dextrose 10% bolus 125 mL  12.5 g Intravenous PRN Henry Tolliver MD   Stopped at 12/08/22 1058    dextrose 10% bolus 250 mL  25 g Intravenous PRN Henry Tolliver MD        diclofenac sodium 1 % gel 4 g  4 g Topical (Top) TID Ernie Townsend MD   4 g at 12/11/22 2100    estradioL tablet 1 mg  1 mg Oral Daily Henry Tloliver MD   1 mg at 12/12/22 0814    glucagon (human recombinant) injection 1 mg  1 mg Intramuscular PRN Henry Tolliver MD        glucose chewable tablet 16 g  16 g Oral PRN Henry Tolliver MD   16 g at 12/10/22 0531    glucose chewable tablet 24 g  24 g Oral PRN Henry Tloliver MD   24 g at 12/09/22 1116    heparin (porcine) injection 5,000 Units  5,000 Units Subcutaneous Q8H Henry Tolliver MD        hydrALAZINE tablet 25 mg  25 mg Oral Q6H PRN Ernie Townsend MD   25 mg at 12/11/22 0747    HYDROcodone-acetaminophen  mg per tablet 1 tablet  1 tablet Oral Q6H PRN Henry Tolliver MD   1 tablet at 12/08/22 2237    insulin aspart U-100 pen 0-5 Units  0-5 Units Subcutaneous QID (AC + HS) PRN Henry Tolliver MD   2 Units at 12/09/22 0820    insulin aspart U-100 pen 10 Units  10 Units Subcutaneous TIDWM Henry Tolliver MD   10 Units at 12/12/22 1200    insulin detemir U-100 pen 45 Units  45 Units Subcutaneous BID Ernie Townsend MD   45 Units at 12/12/22 0813    loperamide capsule 2 mg  2 mg Oral QID PRN Ernie Townsend MD        magnesium oxide tablet 800 mg  800 mg Oral PRN Henry Tolliver MD        magnesium oxide tablet 800 mg  800 mg Oral PRN Henry Tolliver MD        melatonin tablet 6 mg  6 mg Oral Nightly PRN Henry Tolliver MD        morphine injection 2 mg  2 mg Intravenous Q2H PRN Henry Tolliver MD        naloxone 0.4 mg/mL injection 0.02 mg  0.02 mg Intravenous PRN Henry Tolliver MD         ondansetron injection 4 mg  4 mg Intravenous Q8H PRN Henry Tolliver MD   4 mg at 12/06/22 1618    polyethylene glycol packet 17 g  17 g Oral Daily Henry Tolliver MD        potassium bicarbonate disintegrating tablet 35 mEq  35 mEq Oral PRN Henry Tolliver MD        potassium bicarbonate disintegrating tablet 50 mEq  50 mEq Oral PRN Henry Tolliver MD        potassium bicarbonate disintegrating tablet 60 mEq  60 mEq Oral PRN Henry Tolliver MD        potassium, sodium phosphates 280-160-250 mg packet 2 packet  2 packet Oral PRN Henry Tolliver MD        potassium, sodium phosphates 280-160-250 mg packet 2 packet  2 packet Oral PRN Henry Tolliver MD        potassium, sodium phosphates 280-160-250 mg packet 2 packet  2 packet Oral PRN Henry Tolliver MD        prochlorperazine injection Soln 5 mg  5 mg Intravenous Q6H PRN Henry Tolliver MD        simethicone chewable tablet 80 mg  1 tablet Oral QID PRN Henry Tolliver MD        sodium chloride 0.9% flush 10 mL  10 mL Intravenous Q12H PRN Henry Tolliver MD        sodium zirconium cyclosilicate packet 10 g  10 g Oral TID Ernie Townsend MD         Current Discharge Medication List        START taking these medications    Details   cefTRIAXone (ROCEPHIN) 2 gram/50 mL IVPB Inject 50 mLs (2 g total) into the vein Daily. Inject via PICC line           CONTINUE these medications which have NOT CHANGED    Details   DESCOVY 200-25 mg Tab Take 1 tablet by mouth once daily.      insulin aspart U-100 (NOVOLOG) 100 unit/mL injection Inject 10 Units into the skin 3 (three) times daily before meals.      insulin glargine 100 units/mL (3mL) SubQ pen Inject 50 Units into the skin every evening.  Qty: 15 mL, Refills: 0      spironolactone (ALDACTONE) 25 MG tablet Take 25 mg by mouth once daily.    Comments: <!--EPICS-->.<!--EPICE-->        estradioL (ESTRACE) 1 MG tablet Take 1 mg by mouth once daily.               I have seen and examined this patient within the  last 30 days. My clinical findings that support the need for the home health skilled services and home bound status are the following:no   Weakness/numbness causing balance and gait disturbance due to Joint Replacement, Infection, and Surgery making it taxing to leave home.     Diet:   other low potassium    Labs:  As below    Referrals/ Consults  Physical Therapy to evaluate and treat. Evaluate for home safety and equipment needs; Establish/upgrade home exercise program. Perform / instruct on therapeutic exercises, gait training, transfer training, and Range of Motion.  Occupational Therapy to evaluate and treat. Evaluate home environment for safety and equipment needs. Perform/Instruct on transfers, ADL training, ROM, and therapeutic exercises.    Activities:   activity as tolerated    Nursing:   Agency to admit patient within 24 hours of hospital discharge unless specified on physician order or at patient request    SN to complete comprehensive assessment including routine vital signs. Instruct on disease process and s/s of complications to report to MD. Review/verify medication list sent home with the patient at time of discharge  and instruct patient/caregiver as needed. Frequency may be adjusted depending on start of care date.     Skilled nurse to perform up to 3 visits PRN for symptoms related to diagnosis    Notify MD if SBP > 160 or < 90; DBP > 90 or < 50; HR > 120 or < 50; Temp > 101; O2 < 88%; Other:       Ok to schedule additional visits based on staff availability and patient request on consecutive days within the home health episode.    When multiple disciplines ordered:    Start of Care occurs on Sunday - Wednesday schedule remaining discipline evaluations as ordered on separate consecutive days following the start of care.    Thursday SOC -schedule subsequent evaluations Friday and Monday the following week.     Friday - Saturday SOC - schedule subsequent discipline evaluations on consecutive days  starting Monday of the following week.    For all post-discharge communication and subsequent orders please contact patient's primary care physician.     Miscellaneous   Home Infusion Therapy:   SN to perform Infusion Therapy/Central Line Care.  Review Central Line Care & Central Line Flush with patient.    Outpatient Antibiotic Therapy Plan:        1) Infection: strep bacteremia     2) Discharge Antibiotics:     Intravenous antibiotics:  Ceftriaxone 2gm iv daily     3) Therapy Duration:  4 weeks     Estimated end date of IV antibiotics: 1/1/22     4) Outpatient Weekly Labs:     Order the following labs to be drawn on Mondays:   CBC  CMP   CRP     5) Fax Lab Results to Infectious Diseases Provider: Dr Gillis     [facility] Fax Number: [phone]       Scrub the Hub: Prior to accessing the line, always perform a 30 second alcohol scrub  Each lumen of the central line is to be flushed at least daily with 10 mL Normal Saline and 3 mL Heparin flush (10 units/mL)  Skilled Nurse (SN) may draw blood from IV access  Blood Draw Procedure:   - Aspirate at least 5 mL of blood   - Discard   - Obtain specimen   - Change injection cap   - Flush with 20 mL Normal Saline followed by a                 3-5 mL Heparin flush (10 units/mL)  Central :   - Sterile dressing changes are done weekly and as needed.   - Use chlor-hexadine scrub to cleanse site, apply Biopatch to insertion site,       apply securement device dressing   - Injection caps are changed weekly and after EVERY lab draw.   - If sterile gauze is under dressing to control oozing,                 dressing change must be performed every 24 hours until gauze is not needed.    Wound Care Orders  Pending    I certify that this patient is confined to her home and needs intermittent skilled nursing care, physical therapy, and occupational therapy.

## 2022-12-12 NOTE — PROCEDURES
Pratik Torres is a 27 y.o. adult patient.    Temp: 99.3 °F (37.4 °C) (12/12/22 1159)  Pulse: 98 (12/12/22 1159)  Resp: 18 (12/12/22 1159)  BP: (!) 151/90 (12/12/22 1159)  SpO2: 100 % (12/12/22 1159)  Weight: 93.6 kg (206 lb 5.6 oz) (12/11/22 0540)  Height: 6' (182.9 cm) (12/06/22 0800)    PICC  Date/Time: 12/12/2022 2:55 PM  Performed by: Martín Saavedra RN  Consent Done: Yes  Time out: Immediately prior to procedure a time out was called to verify the correct patient, procedure, equipment, support staff and site/side marked as required  Indications: med administration and vascular access  Anesthesia: local infiltration  Local anesthetic: lidocaine 1% without epinephrine  Anesthetic Total (mL): 2  Preparation: skin prepped with ChloraPrep  Skin prep agent dried: skin prep agent completely dried prior to procedure  Sterile barriers: all five maximum sterile barriers used - cap, mask, sterile gown, sterile gloves, and large sterile sheet  Hand hygiene: hand hygiene performed prior to central venous catheter insertion  Location details: left basilic  Catheter type: double lumen  Catheter size: 5 Fr  Catheter Length: 42cm    Ultrasound guidance: yes  Vessel Caliber: medium and patent, compressibility normal  Vascular Doppler: not done  Needle advanced into vessel with real time Ultrasound guidance.  Guidewire confirmed in vessel.  Sterile sheath used.  no esophageal manometryNumber of attempts: 1  Post-procedure: blood return through all ports, chlorhexidine patch and sterile dressing applied  Estimated blood loss (mL): 0          Name Martín Saavedra RN  12/12/2022

## 2022-12-12 NOTE — PLAN OF CARE
Problem: Adult Inpatient Plan of Care  Goal: Plan of Care Review  Outcome: Ongoing, Progressing  Goal: Absence of Hospital-Acquired Illness or Injury  Outcome: Ongoing, Progressing  Intervention: Prevent Infection  Flowsheets (Taken 12/11/2022 1853)  Infection Prevention:   hand hygiene promoted   rest/sleep promoted     Problem: Skin Injury Risk Increased  Goal: Skin Health and Integrity  Outcome: Ongoing, Progressing  Intervention: Optimize Skin Protection  Flowsheets (Taken 12/11/2022 1853)  Pressure Reduction Techniques:   frequent weight shift encouraged   heels elevated off bed   rest period provided between sit times  Skin Protection: adhesive use limited  Head of Bed (HOB) Positioning: HOB at 45 degrees  Intervention: Promote and Optimize Oral Intake  Flowsheets (Taken 12/11/2022 1853)  Oral Nutrition Promotion: rest periods promoted     Problem: Diabetes Comorbidity  Goal: Blood Glucose Level Within Targeted Range  Outcome: Ongoing, Progressing  Intervention: Monitor and Manage Glycemia  Flowsheets (Taken 12/11/2022 1853)  Glycemic Management: blood glucose monitored     Problem: Adjustment to Illness (Sepsis/Septic Shock)  Goal: Optimal Coping  Intervention: Optimize Psychosocial Adjustment to Illness  Flowsheets (Taken 12/11/2022 1853)  Supportive Measures: active listening utilized

## 2022-12-12 NOTE — PLAN OF CARE
[place] - Telemetry  Discharge Final Note    Primary Care Provider: Felipe Summers MD    Expected Discharge Date: 12/12/2022    Final Discharge Note (most recent)       Final Note - 12/12/22 1626          Final Note    Assessment Type Final Discharge Note     Anticipated Discharge Disposition Home-Health Care [facility]     What phone number can be called within the next 1-3 days to see how you are doing after discharge? [phone]     Hospital Resources/Appts/Education Provided Appointments scheduled and added to AVS;Appointments scheduled by Navigator/Coordinator        Post-Acute Status    Post-Acute Authorization Other   SW was not able to find an accepting Home Health agency.    Post-Acute Placement Status Discharge Plan Changed     Other Status --   Patient currently receiving Long Term Care services.    Discharge Delays None known at this time                     Important Message from Medicare  Important Message from Medicare regarding Discharge Appeal Rights: Given to patient/caregiver, Explained to patient/caregiver, Signed/date by patient/caregiver     Date IMM was signed: 12/12/22  Time IMM was signed: 1058    Contact Info       [facility]    [address] [address]   Phone: [phone]       Next Steps: Follow up    Henry Tolliver MD   Specialty: Orthopedic Surgery    [address]  [facility] [address]   Phone: [phone]       Next Steps: Follow up on 12/22/2022    Instructions: Appointment scheduled for 9:00am    ELIZABETH HILARIO NP   Specialty: Family Medicine    [address] [address]   Phone: [phone]       Next Steps: Follow up on 12/14/2022    Instructions: Keep already scheduled appointment  for 3:40pm  Please request for a dental referral.    EGAN [facility] [place]   Specialty: Home Health Services, Home Therapy Services, Home Living Aide Services    [address]  [facility] [address]   Phone: [phone]       Next Steps: Follow up    Instructions: Home Health          HIREN spoke with nurse Stefany re: patient needing transportation. Stefany informed SW that patient will order an uber. HIREN voiced understanding.  HIREN explained patient cleared from case management standpoint.

## 2022-12-12 NOTE — PLAN OF CARE
12/12/22 1101   Medicare Message   Important Message from Medicare regarding Discharge Appeal Rights Given to patient/caregiver;Explained to patient/caregiver;Signed/date by patient/caregiver   Date IMM was signed 12/12/22   Time IMM was signed 2132

## 2022-12-12 NOTE — NURSING
Patient removed dressing from right stomp. Will replace.    2p Patient wanted to re wrap his own stump

## 2022-12-12 NOTE — PROGRESS NOTES
[facility] nurse educator met with patient discussed discharge plan for home IVABX. Patient states she has done home infusion in the past many times and knows how to do everything. Patient will infuse medication via Elastomeric Pump. Patient verbally reviewed with on S.A.S.H procedure. Written instruction on S.A.S.H mat provided. Patient education checklist reviewed and acknowledged by above person(s) and are agreeable to discharge with home infusion plan of care. IV administration process using aspetic technique was reviewed. Patient feels comfortable with infusion. Patient will dc home with Ceftriaxone 2 grams once per day. Estimated EOC 1/1/22. Patient is currently pending picc line placement. Patient states extensions are needed. Liaison will return once picc is placed to put extensions on. Lorne MORALES (pending acceptance) will follow patient for dressing changes and lab draws. Time allotted for questions. Patients nurse and case management team notified teaching has been completed.     Medication delivery will be made to home    3:30pm: returned after picc line was placed to left upper arm. Extension tubing placed on both lumens for self infusion. Patient is still pending chest x ray for placement verification. Did not flush line r/t this. Tube gauze applied to left upper arm.     Anna Lopez, RN, BSN  Clinical Liaison   [facility]  Cell [phone]  Available M-F 8:30-5pm  Office [phone]  Available 24/7

## 2022-12-12 NOTE — PT/OT/SLP PROGRESS
Physical Therapy Treatment    Patient Name:  Pratik Torres   MRN:  [id number]    Recommendations:     Discharge Recommendations: home health PT (with family assistance)  Discharge Equipment Recommendations: none  Barriers to discharge: None    Assessment:     Pratik Torres is a 27 y.o. adult admitted with a medical diagnosis of Bacterial infection due to Streptococcus, group G.  She presents with the following impairments/functional limitations: weakness, impaired endurance, impaired sensation, impaired functional mobility, gait instability, impaired balance, decreased lower extremity function, decreased upper extremity function, decreased safety awareness, pain, decreased ROM, impaired skin, edema, orthopedic precautions.    Rehab Prognosis: Good; patient would benefit from acute skilled PT services to address these deficits and reach maximum level of function.    Recent Surgery: Procedure(s) (LRB):  AMPUTATION, BELOW KNEE I&D /REVISION SAW/PULSA VAC (Right) 4 Days Post-Op    Plan:     During this hospitalization, patient to be seen 5 x/week to address the identified rehab impairments via gait training, therapeutic activities, therapeutic exercises, wheelchair management/training and progress toward the following goals:    Plan of Care Expires:  12/23/22    Subjective     Chief Complaint: Pt reported that the R stump dressings have been sliding off.   Patient/Family Comments/goals: Pt agreeable to therapy.    Pain/Comfort:  Pain Rating 1: 0/10      Objective:     Communicated with nurse Mccall prior to session.  Patient found  sitting in bed  with peripheral IV upon PT entry to room.     General Precautions: Standard, fall, diabetic  Orthopedic Precautions:  (RLE NWB 2* BKA I+D and revision)  Braces: N/A  Respiratory Status: Room air     Functional Mobility:  Pt found sitting in bed, on the phone and ordering personal items from hospital gift shop.  Pt asking about obtaining crutches to get into the house after being  "dropped off by Uber when D/C'ed home from hospital.  PT offered gait training with crutches to determine safety with ambulation.  Initially pt agreeable and then declined, reported that the neighbors can help get the RW out of her house for her to use to get into the house.  Pt decided to remove R stump dressings that have been sliding off, enforced that MD typically would like to remove and perform the first dressing change after sx.  Pt started to remove all the dressings and asked for some new dressings to wrap R stump.  Nurse Stefany notified, pt declined any assistance with placement of new dressings.  Pt wrapped R stump with casting pad, coban, and ACE.  Pt able to don wig and clothing at the bedside with supervision prior to gait.  Pt easily fatigued with ambulation, required extra time and rest breaks.  Pt educated on safety with ambulation, will need reinforcement.    Bed Mobility:     Scooting: supervision  Supine to Sit: supervision  Transfers:     Sit to Stand: stand by assistance and contact guard assistance with rolling walker  Bed to Chair: Pt declined bedside chair.  Gait: Pt ambulated ~100 ft with CGA using RW.  Pt required multiple standing rest breaks, appeared fatigue however reported "It's just my bipolar."  Pt encouraged to sit and rest, however declined and determined to ambulate back to bed.  Pt with 1 LOB toward end of gait, min A-CGA to recover.  Pt educated on safety with ambulation.  Balance: Pt with fair dynamic standing balance.       AM-PAC 6 CLICK MOBILITY  Turning over in bed (including adjusting bedclothes, sheets and blankets)?: 4  Sitting down on and standing up from a chair with arms (e.g., wheelchair, bedside commode, etc.): 4  Moving from lying on back to sitting on the side of the bed?: 4  Moving to and from a bed to a chair (including a wheelchair)?: 3  Need to walk in hospital room?: 3  Climbing 3-5 steps with a railing?: 1  Basic Mobility Total Score: 19       Treatment & " Education:  Pt declined to perform any LE therex at this time.  PT offered pt HEP for LE therex, however pt declined and reported that she already have about 5 at home.  Pt educated on the importance of LE strengthening.  Pt verbalized good understanding.     Patient left sitting edge of bed with all lines intact, call button in reach, and nurse Stefany notified.  Tray table close by.     GOALS:   Multidisciplinary Problems       Physical Therapy Goals          Problem: Physical Therapy    Goal Priority Disciplines Outcome Goal Variances Interventions   Physical Therapy Goal     PT, PT/OT Ongoing, Progressing     Description: Goals to be met by: 22     Patient will increase functional independence with mobility by performin. Supine to sit with Modified [place]  2. Rolling to Left and Right with Modified [place]  3. Sit to stand transfer with Modified [place] using RW  4. Bed to chair transfer with Modified [place] using Rolling Walker  5. Gait x50 feet with Modified [place] using Rolling Walker   6. Wheelchair propulsion x150 feet with Modified [place] using bilateral upper extremities  7. Lower extremity exercise program 2 sets x15 reps per handout, with independence                         Time Tracking:     PT Received On: 22  PT Start Time: 1059     PT Stop Time: 1137  PT Total Time (min): 38 min     Billable Minutes: Gait Training 23 min and Therapeutic Activity 15 min    Treatment Type: Evaluation              2022

## 2022-12-12 NOTE — NURSING
Report received from off going Nurse Jessee Pt is awake and alert talking on the phone . Denies any pain or discomfort at this time . Bed down SR up x 2 HOB. Call bell in reach.

## 2022-12-13 NOTE — PHYSICIAN QUERY
PT Name: Pratik Torres  MR #: [id number]     Documentation Clarification      CDS/: Yennifer Butler RN          Contact Information: [email or web address]      This form is a permanent document in the medical record.     Query Date: December 13, 2022    By submitting this query, we are merely seeking further clarification of documentation. Please utilize your independent clinical judgment when addressing the question(s) below.    The Medical Record reflects the following:    Supporting Clinical Findings Location in Medical Record   Procedure in detail:    After proper consents were obtained, patient was taken to the operating room and administered general anesthesia.  Right lower extremity was then prepped and draped in normal sterile fashion.  Wound was again inspected and was noted that the distal tibia was ruptured through the open portion of the incision.  There was a separate skin erosion with exposed fibula.  Skin edges for both wounds were then sharply excised.  Extensive pseudo around was present within the wound and this was sharply excised as well.  Subperiosteal dissection was then performed on the tibia about 3-4 cm from the distal extent.  Retractors were placed and Key elevator was placed to protect the neurovascular structures.  Osteotomy was then made with the oscillating saw and the distal portion of the residual tibia was removed.  This was then performed on the fibula.  Further dissection was then performed on the lateral side of the wound and the distal portion of the residual fibula was exposed.  About 2 cm proximal to the tibial resection osteotomy was made through the fibula and the distal portion of the fibula was removed.  Pseudo around around the tibia and fibula were then debulked.  Hemostasis was then achieved.  3 L normal saline were then irrigated through the wound with the Pulsavac.  No purulence was encountered throughout this procedure.  Once debridement and irrigation were  completed closure was performed.  Deep layer was closed to the anterior periosteum with 0 Vicryl.  Subcutaneous tissues closed with 2-0 Vicryl.  Skin incision was closed with staples.  The ulceration of the fibula was then closed with 2-0 Vicryl and 2-0 nylon.  Sterile dressings were then applied.  12/8/2022 Op Note                                                                            Provider, please clarify the level of amputation of the right lower extremity.    [   ] High (proximal third)   [  x ] Mid (middle third)   [   ] Low (distal third)   [   ] Other (please specify): ____________   [  ] Clinically undetermined

## 2022-12-14 ENCOUNTER — PATIENT OUTREACH (OUTPATIENT)
Dept: ADMINISTRATIVE | Facility: CLINIC | Age: 27
End: 2022-12-14
Payer: MEDICARE

## 2022-12-14 NOTE — DISCHARGE SUMMARY
"[facility] Medicine  Discharge Summary      Patient Name: Pratik Torres  MRN: [id number]  Banner: [id number]  Patient Class: IP- Inpatient  Admission Date: 12/5/2022  Hospital Length of Stay: 7 days  Discharge Date and Time: 12/12/2022  5:53 PM  Attending Physician: No att. providers found   Discharging Provider: Ernie Townsend MD  Primary Care Provider: Felipe Summers MD    Primary Care Team: Networked reference to record PCT     HPI:   This is a 27 year old transgender female with a PMHx of T1DM c/b RLE cellulitis s/p right BKA, iron deficiency anemia, IBS-D who presents with fevers.     Patient reports developing fevers, chills, multiple episodes of non bloody emesis and decreased po intake 2 days prior to presentation. Associated symptoms include cough, and SOB that started a day prior. The patient has a chronic right stump wound and reports that he has "a bone infection" that she's following with wound care for. In the ED, he was febrile (39.5), tachycardic (140), tachypnic. Labs showed no leukocytosis (8.7), microcytic anemia (5.9), hyponatremia (128), elevated creatinine (2.1 baseline of 1.2), negative lactic acid (1.0), elevated procalcitonin (25.0), negative troponin (0.017). CT CAP showed multifocal patchy opacities seen within the lower lobes, left greater than right (aspiration versus infectious process). Xray right tib/fib showed osteomyelitis at the distal aspect of the tibia and fibula amputation site. The patient was given fluids, vancomycin, zosyn and was admitted for further management.       Procedure(s) (LRB):  AMPUTATION, BELOW KNEE I&D /REVISION SAW/PULSA VAC (Right)      Hospital Course:   Ms Torres is a 27y transgender F w/ T1DM and R BKA who presented with fevers and was found to have BKA stump osteomyelitis and Group G Strep Bacteremia. She underwent stump revision with orthopedics, unfortunately no operative samples were sent for culture or pathology. TTE did not show " vegetation; pt refused KEL. ID recommended 4 weeks CTX with further therapy to decided at outpatient visit. There was some concern for a dental source of bacteremia given the patient's poor dentition; ID recommended outpatient dental evaluation.    Of note, pt was noted to be hyperkalemic during the admission. She reported frequently eating bananas, even from her personal supply during her hospital admission. Pt counseled of the importance of moderating potassium intake while on spironolactone and the importance of avoiding excess sugar intake for a diabetic patient.     Ms. Torres was discharged on IV CTX to follow up with orthopedics, ID, dentistry, and her PCP for management of her numerous complex medical issues. Home PT/OT was ordered based on recommendations from inpatient PT/OT. All questions answered prior to discharge, pt in agreement with plan.        Goals of Care Treatment Preferences:  Code Status: Full Code      Consults:   Consults (From admission, onward)        Status Ordering Provider     Inpatient consult to Orthopedic Surgery  Once        Provider:  Henry Tolliver MD    Completed DAVID NOE     Inpatient consult to Infectious Diseases  Once        Provider:  Ami Gillis MD    Completed NATALIE BROWN          * Bacterial infection due to Streptococcus, group G  Potential sources include wound stump, pneumonia, or dental caries. TTE without vegetation, pt refuses KEL  - ID consulted  - most recent cx NGTD  - cont CTX, plan for six weeks of treatment      Streptococcal bacteremia  Strep G bacteremia      Transgender woman on hormone therapy  Resume estrace     Osteomyelitis of tibia  Xray right tib/fib showed osteomyelitis at the distal aspect of the tibia and fibula amputation site. S/p revision on 12/8. Unfortunately no cultures or pathology taken at site at time of revision      Pneumonia  Presented with fevers, SOB, and cough. CT CAP showed multifocal patchy opacities seen within  the lower lobes, left greater than right   - continue ceftriaxone    Sepsis  Sepsis physiology resolved. See osteomyelitis, pneumonia, and bacteremia.    THEE (acute kidney injury)  Creatinine appears to have peaked at 3.0, now downtrending, suggestive of ATN. Unclear baseline, was 1.2 in 2020 but no values on record since then  - trend BMP    Microcytic anemia  Chronic anemia of unknown etiology w/ expected postoperative anemia due to blood loss, inflammation, and hemodilution. No active bleeding noted  Transfuse to keep Hb > 7       Diarrhea  Likely related to IBS-D      Type 1 diabetes mellitus with hyperglycemia  Reduced insulin dosing due to hypoglycemia      Final Active Diagnoses:    Diagnosis Date Noted POA    PRINCIPAL PROBLEM:  Bacterial infection due to Streptococcus, group G [B95.4] 12/07/2022 Yes    Streptococcal bacteremia [R78.81, B95.5] 12/06/2022 Yes    Sepsis [A41.9] 12/05/2022 Yes    Pneumonia [J18.9] 12/05/2022 Yes    Osteomyelitis of tibia [M86.9] 12/05/2022 Yes    Transgender woman on hormone therapy [F64.0, Z79.899] 12/05/2022 Not Applicable    THEE (acute kidney injury) [N17.9] 06/11/2020 Yes    Microcytic anemia [D50.9] 06/08/2020 Yes    Diarrhea [R19.7] 11/04/2019 Yes    Type 1 diabetes mellitus with hyperglycemia [E10.65] 08/31/2018 Yes      Problems Resolved During this Admission:       Discharged Condition: good    Disposition: Home-Health Care c    Follow Up:   Follow-up Information     [facility] Follow up.    Contact information:  [address] [facility] [address] [phone]             Henry Tolliver MD Follow up on 12/22/2022.    Specialty: Orthopedic Surgery  Why: Appointment scheduled for 9:00am  Contact information:  [address] [facility] [place] [facility] [address] [phone]             ELIZABETH HILARIO NP Follow up on 12/14/2022.    Specialty: Family Medicine  Why: Keep already scheduled appointment  for  3:40pm  Please request for a dental referral.  Contact information:  [address]  [facility] [address]  [phone]             [facility]    Specialties: Home Health Services, Home Therapy Services, Home Living Aide Services  Why: Home Health  Contact information:  [address]  [facility] [address]  [phone]                     Patient Instructions:      Change dressing (specify)   Order Comments: Change dressing as needed if soiled       Significant Diagnostic Studies: as above    Pending Diagnostic Studies:     None         Medications:  Reconciled Home Medications:      Medication List      START taking these medications    cefTRIAXone 2 gram/50 mL IVPB  Commonly known as: ROCEPHIN  Inject 50 mLs (2 g total) into the vein Daily. Inject via PICC line        CHANGE how you take these medications    insulin glargine 100 units/mL SubQ pen  Inject 50 Units into the skin every evening.  What changed: how much to take        CONTINUE taking these medications    DESCOVY 200-25 mg Tab  Generic drug: emtricitabine-tenofovir alafen  Take 1 tablet by mouth once daily.     estradioL 1 MG tablet  Commonly known as: ESTRACE  Take 1 mg by mouth once daily.     insulin aspart U-100 100 unit/mL injection  Commonly known as: NovoLOG  Inject 10 Units into the skin 3 (three) times daily before meals.     spironolactone 25 MG tablet  Commonly known as: ALDACTONE  Take 25 mg by mouth once daily.            Indwelling Lines/Drains at time of discharge:   Lines/Drains/Airways     Peripherally Inserted Central Catheter Line  Duration           PICC Double Lumen 12/12/22 1455 left basilic 2 days                Time spent on the discharge of patient: 45 minutes         Ernie Townsend MD  Department of Hospital Medicine  [facility] [place] [facility]

## 2022-12-15 DIAGNOSIS — E10.10 DIABETIC KETOACIDOSIS WITHOUT COMA ASSOCIATED WITH TYPE 1 DIABETES MELLITUS: ICD-10-CM

## 2022-12-15 DIAGNOSIS — E10.65 TYPE 1 DIABETES MELLITUS WITH HYPERGLYCEMIA: Primary | ICD-10-CM

## 2022-12-19 ENCOUNTER — LAB VISIT (OUTPATIENT)
Dept: LAB | Facility: HOSPITAL | Age: 27
End: 2022-12-19
Attending: INTERNAL MEDICINE
Payer: MEDICARE

## 2022-12-19 DIAGNOSIS — A41.9 SYSTEMIC INFECTION: Primary | ICD-10-CM

## 2022-12-19 DIAGNOSIS — B95.5 BETA HEMOLYTIC STREPTOCOCCUS CULTURE POSITIVE: ICD-10-CM

## 2022-12-19 DIAGNOSIS — R78.1 FINDING OF OPIATE DRUG IN BLOOD: ICD-10-CM

## 2022-12-19 DIAGNOSIS — B95.4 BACTERIAL INFECTION DUE TO STREPTOCOCCUS, GROUP G: ICD-10-CM

## 2022-12-19 LAB
ALBUMIN SERPL BCP-MCNC: 2.1 G/DL (ref 3.5–5.2)
ALP SERPL-CCNC: 85 U/L (ref 55–135)
ALT SERPL W/O P-5'-P-CCNC: 13 U/L (ref 10–44)
ANION GAP SERPL CALC-SCNC: 7 MMOL/L (ref 8–16)
AST SERPL-CCNC: 13 U/L (ref 10–40)
BASOPHILS # BLD AUTO: 0.04 K/UL (ref 0–0.2)
BASOPHILS NFR BLD: 0.5 % (ref 0–1.9)
BILIRUB SERPL-MCNC: 0.2 MG/DL (ref 0.1–1)
BUN SERPL-MCNC: 20 MG/DL (ref 6–20)
CALCIUM SERPL-MCNC: 8.5 MG/DL (ref 8.7–10.5)
CHLORIDE SERPL-SCNC: 116 MMOL/L (ref 95–110)
CO2 SERPL-SCNC: 18 MMOL/L (ref 23–29)
CREAT SERPL-MCNC: 1.5 MG/DL (ref 0.5–1.4)
CRP SERPL-MCNC: 8.3 MG/L (ref 0–8.2)
DIFFERENTIAL METHOD: ABNORMAL
EOSINOPHIL # BLD AUTO: 0.1 K/UL (ref 0–0.5)
EOSINOPHIL NFR BLD: 1.6 % (ref 0–8)
ERYTHROCYTE [DISTWIDTH] IN BLOOD BY AUTOMATED COUNT: 17.5 % (ref 11.5–14.5)
EST. GFR  (NO RACE VARIABLE): >60 ML/MIN/1.73 M^2
GLUCOSE SERPL-MCNC: 65 MG/DL (ref 70–110)
HCT VFR BLD AUTO: 30.9 % (ref 40–54)
HGB BLD-MCNC: 9.4 G/DL (ref 14–18)
IMM GRANULOCYTES # BLD AUTO: 0.02 K/UL (ref 0–0.04)
IMM GRANULOCYTES NFR BLD AUTO: 0.3 % (ref 0–0.5)
LYMPHOCYTES # BLD AUTO: 2.2 K/UL (ref 1–4.8)
LYMPHOCYTES NFR BLD: 29.5 % (ref 18–48)
MCH RBC QN AUTO: 24.9 PG (ref 27–31)
MCHC RBC AUTO-ENTMCNC: 30.4 G/DL (ref 32–36)
MCV RBC AUTO: 82 FL (ref 82–98)
MONOCYTES # BLD AUTO: 0.4 K/UL (ref 0.3–1)
MONOCYTES NFR BLD: 5.2 % (ref 4–15)
NEUTROPHILS # BLD AUTO: 4.6 K/UL (ref 1.8–7.7)
NEUTROPHILS NFR BLD: 62.9 % (ref 38–73)
NRBC BLD-RTO: 0 /100 WBC
PLATELET # BLD AUTO: 321 K/UL (ref 150–450)
PMV BLD AUTO: 11.3 FL (ref 9.2–12.9)
POTASSIUM SERPL-SCNC: 4.2 MMOL/L (ref 3.5–5.1)
PROT SERPL-MCNC: 9 G/DL (ref 6–8.4)
RBC # BLD AUTO: 3.78 M/UL (ref 4.6–6.2)
SODIUM SERPL-SCNC: 141 MMOL/L (ref 136–145)
WBC # BLD AUTO: 7.28 K/UL (ref 3.9–12.7)

## 2022-12-19 PROCEDURE — 80053 COMPREHEN METABOLIC PANEL: CPT | Performed by: INTERNAL MEDICINE

## 2022-12-19 PROCEDURE — 85025 COMPLETE CBC W/AUTO DIFF WBC: CPT | Performed by: INTERNAL MEDICINE

## 2022-12-19 PROCEDURE — 86140 C-REACTIVE PROTEIN: CPT | Performed by: INTERNAL MEDICINE

## 2022-12-26 ENCOUNTER — LAB VISIT (OUTPATIENT)
Dept: LAB | Facility: HOSPITAL | Age: 27
End: 2022-12-26
Attending: NURSE PRACTITIONER
Payer: MEDICARE

## 2022-12-26 DIAGNOSIS — Z86.19 HISTORY OF POSITIVE PCR FOR HERPES SIMPLEX VIRUS TYPE 1 (HSV-1) DNA: ICD-10-CM

## 2022-12-26 DIAGNOSIS — J18.9 UNRESOLVED PNEUMONIA: ICD-10-CM

## 2022-12-26 DIAGNOSIS — N19 KIDNEY FAILURE: ICD-10-CM

## 2022-12-26 DIAGNOSIS — A40.8 SEPTICEMIA DUE TO ANAEROBIC STREPTOCOCCI: Primary | ICD-10-CM

## 2022-12-26 LAB
ALBUMIN SERPL BCP-MCNC: 2.3 G/DL (ref 3.5–5.2)
ALP SERPL-CCNC: 102 U/L (ref 55–135)
ALT SERPL W/O P-5'-P-CCNC: 13 U/L (ref 10–44)
ANION GAP SERPL CALC-SCNC: 5 MMOL/L (ref 8–16)
AST SERPL-CCNC: 11 U/L (ref 10–40)
BASOPHILS # BLD AUTO: 0.02 K/UL (ref 0–0.2)
BASOPHILS NFR BLD: 0.3 % (ref 0–1.9)
BILIRUB SERPL-MCNC: 0.4 MG/DL (ref 0.1–1)
BUN SERPL-MCNC: 25 MG/DL (ref 6–20)
CALCIUM SERPL-MCNC: 8.4 MG/DL (ref 8.7–10.5)
CHLORIDE SERPL-SCNC: 111 MMOL/L (ref 95–110)
CO2 SERPL-SCNC: 20 MMOL/L (ref 23–29)
CREAT SERPL-MCNC: 1.6 MG/DL (ref 0.5–1.4)
CRP SERPL-MCNC: 16.2 MG/L (ref 0–8.2)
DIFFERENTIAL METHOD: ABNORMAL
EOSINOPHIL # BLD AUTO: 0.2 K/UL (ref 0–0.5)
EOSINOPHIL NFR BLD: 3.4 % (ref 0–8)
ERYTHROCYTE [DISTWIDTH] IN BLOOD BY AUTOMATED COUNT: 18.1 % (ref 11.5–14.5)
EST. GFR  (NO RACE VARIABLE): >60 ML/MIN/1.73 M^2
GLUCOSE SERPL-MCNC: 136 MG/DL (ref 70–110)
HCT VFR BLD AUTO: 23.4 % (ref 40–54)
HGB BLD-MCNC: 7.3 G/DL (ref 14–18)
IMM GRANULOCYTES # BLD AUTO: 0.01 K/UL (ref 0–0.04)
IMM GRANULOCYTES NFR BLD AUTO: 0.2 % (ref 0–0.5)
LYMPHOCYTES # BLD AUTO: 2.2 K/UL (ref 1–4.8)
LYMPHOCYTES NFR BLD: 34.9 % (ref 18–48)
MCH RBC QN AUTO: 24.9 PG (ref 27–31)
MCHC RBC AUTO-ENTMCNC: 31.2 G/DL (ref 32–36)
MCV RBC AUTO: 80 FL (ref 82–98)
MONOCYTES # BLD AUTO: 0.5 K/UL (ref 0.3–1)
MONOCYTES NFR BLD: 7.6 % (ref 4–15)
NEUTROPHILS # BLD AUTO: 3.3 K/UL (ref 1.8–7.7)
NEUTROPHILS NFR BLD: 53.6 % (ref 38–73)
NRBC BLD-RTO: 0 /100 WBC
PLATELET # BLD AUTO: ABNORMAL K/UL (ref 150–450)
PLATELET BLD QL SMEAR: ABNORMAL
PMV BLD AUTO: ABNORMAL FL (ref 9.2–12.9)
POTASSIUM SERPL-SCNC: 4.7 MMOL/L (ref 3.5–5.1)
PREALB SERPL-MCNC: 16 MG/DL (ref 20–43)
PROT SERPL-MCNC: 8.7 G/DL (ref 6–8.4)
RBC # BLD AUTO: 2.93 M/UL (ref 4.6–6.2)
SODIUM SERPL-SCNC: 136 MMOL/L (ref 136–145)
WBC # BLD AUTO: 6.16 K/UL (ref 3.9–12.7)

## 2022-12-26 PROCEDURE — 85025 COMPLETE CBC W/AUTO DIFF WBC: CPT | Performed by: NURSE PRACTITIONER

## 2022-12-26 PROCEDURE — 84134 ASSAY OF PREALBUMIN: CPT | Performed by: NURSE PRACTITIONER

## 2022-12-26 PROCEDURE — 86140 C-REACTIVE PROTEIN: CPT | Performed by: NURSE PRACTITIONER

## 2022-12-26 PROCEDURE — 80053 COMPREHEN METABOLIC PANEL: CPT | Performed by: NURSE PRACTITIONER

## 2022-12-29 ENCOUNTER — TELEPHONE (OUTPATIENT)
Dept: INFECTIOUS DISEASES | Facility: CLINIC | Age: 27
End: 2022-12-29
Payer: MEDICARE

## 2022-12-29 NOTE — TELEPHONE ENCOUNTER
[facility] snet message re: pt    Is currently on Ceftiaxone 2gm every 24 hours with stop date 1/01.     Pt has appt scheduled for WB location on 1/4/23, would you like to extend the IV abx until appt or stop and flush until appt?   Dermal Autograft Text: The defect edges were debeveled with a #15 scalpel blade.  Given the location of the defect, shape of the defect and the proximity to free margins a dermal autograft was deemed most appropriate.  Using a sterile surgical marker, the primary defect shape was transferred to the donor site. The area thus outlined was incised deep to adipose tissue with a #15 scalpel blade.  The harvested graft was then trimmed of adipose and epidermal tissue until only dermis was left.  The skin graft was then placed in the primary defect and oriented appropriately.

## 2023-01-02 ENCOUNTER — LAB VISIT (OUTPATIENT)
Dept: LAB | Facility: HOSPITAL | Age: 28
End: 2023-01-02
Attending: STUDENT IN AN ORGANIZED HEALTH CARE EDUCATION/TRAINING PROGRAM
Payer: MEDICARE

## 2023-01-02 DIAGNOSIS — Z64.1: Primary | ICD-10-CM

## 2023-01-02 LAB
ALBUMIN SERPL BCP-MCNC: 2.3 G/DL (ref 3.5–5.2)
ALP SERPL-CCNC: 98 U/L (ref 55–135)
ALT SERPL W/O P-5'-P-CCNC: 7 U/L (ref 10–44)
ANION GAP SERPL CALC-SCNC: 6 MMOL/L (ref 8–16)
AST SERPL-CCNC: 11 U/L (ref 10–40)
BASOPHILS # BLD AUTO: 0.02 K/UL (ref 0–0.2)
BASOPHILS NFR BLD: 0.4 % (ref 0–1.9)
BILIRUB SERPL-MCNC: 0.3 MG/DL (ref 0.1–1)
BUN SERPL-MCNC: 16 MG/DL (ref 6–20)
CALCIUM SERPL-MCNC: 8.5 MG/DL (ref 8.7–10.5)
CHLORIDE SERPL-SCNC: 111 MMOL/L (ref 95–110)
CO2 SERPL-SCNC: 21 MMOL/L (ref 23–29)
CREAT SERPL-MCNC: 1.4 MG/DL (ref 0.5–1.4)
CRP SERPL-MCNC: 15.2 MG/L (ref 0–8.2)
DIFFERENTIAL METHOD: ABNORMAL
EOSINOPHIL # BLD AUTO: 0.4 K/UL (ref 0–0.5)
EOSINOPHIL NFR BLD: 7.2 % (ref 0–8)
ERYTHROCYTE [DISTWIDTH] IN BLOOD BY AUTOMATED COUNT: 18.6 % (ref 11.5–14.5)
ERYTHROCYTE [SEDIMENTATION RATE] IN BLOOD BY WESTERGREN METHOD: 85 MM/HR (ref 0–10)
EST. GFR  (NO RACE VARIABLE): >60 ML/MIN/1.73 M^2
GLUCOSE SERPL-MCNC: 177 MG/DL (ref 70–110)
HCT VFR BLD AUTO: 24.1 % (ref 40–54)
HGB BLD-MCNC: 7.4 G/DL (ref 14–18)
IMM GRANULOCYTES # BLD AUTO: 0 K/UL (ref 0–0.04)
IMM GRANULOCYTES NFR BLD AUTO: 0 % (ref 0–0.5)
LYMPHOCYTES # BLD AUTO: 2 K/UL (ref 1–4.8)
LYMPHOCYTES NFR BLD: 38 % (ref 18–48)
MCH RBC QN AUTO: 25.6 PG (ref 27–31)
MCHC RBC AUTO-ENTMCNC: 30.7 G/DL (ref 32–36)
MCV RBC AUTO: 83 FL (ref 82–98)
MONOCYTES # BLD AUTO: 0.5 K/UL (ref 0.3–1)
MONOCYTES NFR BLD: 8.7 % (ref 4–15)
NEUTROPHILS # BLD AUTO: 2.4 K/UL (ref 1.8–7.7)
NEUTROPHILS NFR BLD: 45.7 % (ref 38–73)
NRBC BLD-RTO: 0 /100 WBC
PLATELET # BLD AUTO: 229 K/UL (ref 150–450)
PMV BLD AUTO: 11.4 FL (ref 9.2–12.9)
POTASSIUM SERPL-SCNC: 3.9 MMOL/L (ref 3.5–5.1)
PREALB SERPL-MCNC: 15 MG/DL (ref 20–43)
PROT SERPL-MCNC: 8.8 G/DL (ref 6–8.4)
RBC # BLD AUTO: 2.89 M/UL (ref 4.6–6.2)
SODIUM SERPL-SCNC: 138 MMOL/L (ref 136–145)
WBC # BLD AUTO: 5.31 K/UL (ref 3.9–12.7)

## 2023-01-02 PROCEDURE — 83036 HEMOGLOBIN GLYCOSYLATED A1C: CPT | Performed by: STUDENT IN AN ORGANIZED HEALTH CARE EDUCATION/TRAINING PROGRAM

## 2023-01-02 PROCEDURE — 85025 COMPLETE CBC W/AUTO DIFF WBC: CPT | Performed by: STUDENT IN AN ORGANIZED HEALTH CARE EDUCATION/TRAINING PROGRAM

## 2023-01-02 PROCEDURE — 84134 ASSAY OF PREALBUMIN: CPT | Performed by: STUDENT IN AN ORGANIZED HEALTH CARE EDUCATION/TRAINING PROGRAM

## 2023-01-02 PROCEDURE — 85652 RBC SED RATE AUTOMATED: CPT | Performed by: STUDENT IN AN ORGANIZED HEALTH CARE EDUCATION/TRAINING PROGRAM

## 2023-01-02 PROCEDURE — 86140 C-REACTIVE PROTEIN: CPT | Performed by: STUDENT IN AN ORGANIZED HEALTH CARE EDUCATION/TRAINING PROGRAM

## 2023-01-02 PROCEDURE — 80053 COMPREHEN METABOLIC PANEL: CPT | Performed by: STUDENT IN AN ORGANIZED HEALTH CARE EDUCATION/TRAINING PROGRAM

## 2023-01-03 LAB
ESTIMATED AVG GLUCOSE: 174 MG/DL (ref 68–131)
HBA1C MFR BLD: 7.7 % (ref 4–5.6)

## 2023-01-04 ENCOUNTER — OFFICE VISIT (OUTPATIENT)
Dept: INFECTIOUS DISEASES | Facility: CLINIC | Age: 28
End: 2023-01-04
Payer: MEDICARE

## 2023-01-04 VITALS
DIASTOLIC BLOOD PRESSURE: 80 MMHG | HEIGHT: 72 IN | OXYGEN SATURATION: 99 % | WEIGHT: 206 LBS | SYSTOLIC BLOOD PRESSURE: 154 MMHG | HEART RATE: 80 BPM | BODY MASS INDEX: 27.9 KG/M2

## 2023-01-04 DIAGNOSIS — B95.5 STREPTOCOCCAL BACTEREMIA: Primary | ICD-10-CM

## 2023-01-04 DIAGNOSIS — R78.81 STREPTOCOCCAL BACTEREMIA: Primary | ICD-10-CM

## 2023-01-04 PROCEDURE — 1159F PR MEDICATION LIST DOCUMENTED IN MEDICAL RECORD: ICD-10-PCS | Mod: CPTII,S$GLB,, | Performed by: INTERNAL MEDICINE

## 2023-01-04 PROCEDURE — 3079F DIAST BP 80-89 MM HG: CPT | Mod: CPTII,S$GLB,, | Performed by: INTERNAL MEDICINE

## 2023-01-04 PROCEDURE — 99999 PR PBB SHADOW E&M-EST. PATIENT-LVL III: CPT | Mod: PBBFAC,,,

## 2023-01-04 PROCEDURE — 1111F DSCHRG MED/CURRENT MED MERGE: CPT | Mod: CPTII,S$GLB,, | Performed by: INTERNAL MEDICINE

## 2023-01-04 PROCEDURE — 87186 SC STD MICRODIL/AGAR DIL: CPT | Performed by: INTERNAL MEDICINE

## 2023-01-04 PROCEDURE — 1111F PR DISCHARGE MEDS RECONCILED W/ CURRENT OUTPATIENT MED LIST: ICD-10-PCS | Mod: CPTII,S$GLB,, | Performed by: INTERNAL MEDICINE

## 2023-01-04 PROCEDURE — 3077F SYST BP >= 140 MM HG: CPT | Mod: CPTII,S$GLB,, | Performed by: INTERNAL MEDICINE

## 2023-01-04 PROCEDURE — 99215 OFFICE O/P EST HI 40 MIN: CPT | Mod: S$GLB,,, | Performed by: INTERNAL MEDICINE

## 2023-01-04 PROCEDURE — 3051F PR MOST RECENT HEMOGLOBIN A1C LEVEL 7.0 - < 8.0%: ICD-10-PCS | Mod: CPTII,S$GLB,, | Performed by: INTERNAL MEDICINE

## 2023-01-04 PROCEDURE — 3008F BODY MASS INDEX DOCD: CPT | Mod: CPTII,S$GLB,, | Performed by: INTERNAL MEDICINE

## 2023-01-04 PROCEDURE — 3079F PR MOST RECENT DIASTOLIC BLOOD PRESSURE 80-89 MM HG: ICD-10-PCS | Mod: CPTII,S$GLB,, | Performed by: INTERNAL MEDICINE

## 2023-01-04 PROCEDURE — 99215 PR OFFICE/OUTPT VISIT, EST, LEVL V, 40-54 MIN: ICD-10-PCS | Mod: S$GLB,,, | Performed by: INTERNAL MEDICINE

## 2023-01-04 PROCEDURE — 99999 PR PBB SHADOW E&M-EST. PATIENT-LVL III: ICD-10-PCS | Mod: PBBFAC,,,

## 2023-01-04 PROCEDURE — 87075 CULTR BACTERIA EXCEPT BLOOD: CPT | Performed by: INTERNAL MEDICINE

## 2023-01-04 PROCEDURE — 87077 CULTURE AEROBIC IDENTIFY: CPT | Mod: 59 | Performed by: INTERNAL MEDICINE

## 2023-01-04 PROCEDURE — 3077F PR MOST RECENT SYSTOLIC BLOOD PRESSURE >= 140 MM HG: ICD-10-PCS | Mod: CPTII,S$GLB,, | Performed by: INTERNAL MEDICINE

## 2023-01-04 PROCEDURE — 3008F PR BODY MASS INDEX (BMI) DOCUMENTED: ICD-10-PCS | Mod: CPTII,S$GLB,, | Performed by: INTERNAL MEDICINE

## 2023-01-04 PROCEDURE — 87070 CULTURE OTHR SPECIMN AEROBIC: CPT | Mod: 59 | Performed by: INTERNAL MEDICINE

## 2023-01-04 PROCEDURE — 3051F HG A1C>EQUAL 7.0%<8.0%: CPT | Mod: CPTII,S$GLB,, | Performed by: INTERNAL MEDICINE

## 2023-01-04 PROCEDURE — 1159F MED LIST DOCD IN RCRD: CPT | Mod: CPTII,S$GLB,, | Performed by: INTERNAL MEDICINE

## 2023-01-04 RX ORDER — DOXYCYCLINE 100 MG/1
100 CAPSULE ORAL 2 TIMES DAILY
Qty: 28 CAPSULE | Refills: 0 | Status: SHIPPED | OUTPATIENT
Start: 2023-01-04 | End: 2023-01-18

## 2023-01-04 NOTE — PROGRESS NOTES
INFECTIOUS DISEASE CLINIC  01/04/2023 9:06 AM    Subjective:      Chief Complaint: hospital follow up    History of Present Illness:    Patient Pratik Torres is a 27 y.o. adult with h/o T1DM, R BKA, IBS, dental caries who presents today for hospital f/u. Was admitted 12/5 with fatigue /fall, found to have group G strep bacteremia. Source was unclear- dental caries? Endocarditis (2d neg, but patient refused KEL)? Osteomyelitis? Pneumonia? Stump wound bone was exposed and he underwent additional amputation and wound closure. Unfortunately no path/cultures were sent.  Given that his teeth are necrotic and he reports they are breaking apart, high suspicion that could be source. No respiratory symptoms, so pneumonia seems less likely. Can not exclude infective endocarditis without KEL, but patient refuses.     Was d/c'd on 4 weeks of IV ceftriaxone. Reports compliance with antibiotics. Has picc. Reports fall recently and stump wound incision opened back up and is draining. Denies f/c.           Component Ref Range & Units 2 d ago   (1/2/23) 9 d ago   (12/26/22) 2 wk ago   (12/19/22) 2 yr ago   (6/14/20) 2 yr ago   (6/12/20) 2 yr ago   (6/10/20) 2 yr ago   (6/8/20)   CRP 0.0 - 8.2 mg/L 15.2 High   16.2 High   8.3 High   9.1 High   17.9 High   46.4 High   94.3 High          Component Ref Range & Units 2 d ago 2 yr ago   Sed Rate 0 - 10 mm/Hr 85 High   >120 High  R           Review of Symptoms:  Constitutional: Denies fevers, chills, or weakness.  ENT: Denies dysphagia, nasal discharge, ear pain or discharge.  Cardiovascular: Denies chest pain, palpitations, orthopnea, or claudication.  Respiratory: Denies shortness of breath, cough, hemoptysis, or wheezing.  GI: Denies nausea/vomitting, hematochezia, melena, abd pain, or changes in appetite.  : Denies dysuria, incontinence, or hematuria.  Musculoskeletal: as per hpi, stump wound  Skin/breast: Denies rashes, lumps, lesions, or discharge.  Neurologic: Denies headache,  dizziness, vertigo, or paresthesias.    Past Medical History:   Diagnosis Date    Diabetes mellitus     IBS (irritable bowel syndrome)        Past Surgical History:   Procedure Laterality Date    BELOW KNEE AMPUTATION OF LOWER EXTREMITY Right 12/8/2022    Procedure: AMPUTATION, BELOW KNEE I&D /REVISION SAW/PULSA VAC;  Surgeon: Henry Tolliver MD;  Location: [facility];  Service: Orthopedics;  Laterality: Right;    COLONOSCOPY      INCISION OF PERIRECTAL ABSCESS N/A 6/10/2020    Procedure: INCISION, ABSCESS, PERIRECTAL;  Surgeon: Ronald Santo MD;  [facility];  Service: Colon and Rectal;  Laterality: N/A;  PATIENT IS COVID POSITIVE    TOE SURGERY      WOUND DEBRIDEMENT Right 6/10/2020    Procedure: DEBRIDEMENT, WOUND ISCHIORECTAL;  Surgeon: Ronald Santo MD;  [facility];  Service: Colon and Rectal;  Laterality: Right;       No family history on file.    Social History     Socioeconomic History    Marital status: Single   Tobacco Use    Smoking status: Never    Smokeless tobacco: Never   Substance and Sexual Activity    Alcohol use: Yes     Comment: occasionally    Drug use: Not Currently       Review of patient's allergies indicates:   Allergen Reactions    Shrimp          Objective:   BP (!) 154/80   Pulse 80   Ht 6' (1.829 m)   Wt 93.4 kg (206 lb)   SpO2 99%   BMI 27.94 kg/m²     General: Afebrile, alert, comfortable, no acute distress.   HEENT: TRISTAN. EOMI, no scleral icterus.   Pulmonary: Non labored,clear to auscultation A/P/L.   Cardiac: normal S1 & S2 w/o rubs/murmurs/gallops.   Abdominal: Non-tender, non-distended.  Extremities: Moves all extremities x 4. R stump wound with de-hiscense and expressible oozing drainage.   Neurological:  Alert and oriented x 4.     Labs:    Glucose   Date Value Ref Range Status   01/02/2023 177 (H) 70 - 110 mg/dL Final   12/26/2022 136 (H) 70 - 110 mg/dL Final   12/19/2022 65 (L) 70 - 110 mg/dL Final       Calcium   Date Value Ref Range Status    01/02/2023 8.5 (L) 8.7 - 10.5 mg/dL Final   12/26/2022 8.4 (L) 8.7 - 10.5 mg/dL Final   12/19/2022 8.5 (L) 8.7 - 10.5 mg/dL Final       Albumin   Date Value Ref Range Status   01/02/2023 2.3 (L) 3.5 - 5.2 g/dL Final   12/26/2022 2.3 (L) 3.5 - 5.2 g/dL Final   12/19/2022 2.1 (L) 3.5 - 5.2 g/dL Final       Total Protein   Date Value Ref Range Status   01/02/2023 8.8 (H) 6.0 - 8.4 g/dL Final   12/26/2022 8.7 (H) 6.0 - 8.4 g/dL Final   12/19/2022 9.0 (H) 6.0 - 8.4 g/dL Final       Sodium   Date Value Ref Range Status   01/02/2023 138 136 - 145 mmol/L Final   12/26/2022 136 136 - 145 mmol/L Final   12/19/2022 141 136 - 145 mmol/L Final       Potassium   Date Value Ref Range Status   01/02/2023 3.9 3.5 - 5.1 mmol/L Final   12/26/2022 4.7 3.5 - 5.1 mmol/L Final   12/19/2022 4.2 3.5 - 5.1 mmol/L Final       CO2   Date Value Ref Range Status   01/02/2023 21 (L) 23 - 29 mmol/L Final   12/26/2022 20 (L) 23 - 29 mmol/L Final   12/19/2022 18 (L) 23 - 29 mmol/L Final       Chloride   Date Value Ref Range Status   01/02/2023 111 (H) 95 - 110 mmol/L Final   12/26/2022 111 (H) 95 - 110 mmol/L Final   12/19/2022 116 (H) 95 - 110 mmol/L Final       BUN   Date Value Ref Range Status   01/02/2023 16 6 - 20 mg/dL Final   12/26/2022 25 (H) 6 - 20 mg/dL Final   12/19/2022 20 6 - 20 mg/dL Final       Creatinine   Date Value Ref Range Status   01/02/2023 1.4 0.5 - 1.4 mg/dL Final   12/26/2022 1.6 (H) 0.5 - 1.4 mg/dL Final   12/19/2022 1.5 (H) 0.5 - 1.4 mg/dL Final       Alkaline Phosphatase   Date Value Ref Range Status   01/02/2023 98 55 - 135 U/L Final   12/26/2022 102 55 - 135 U/L Final   12/19/2022 85 55 - 135 U/L Final       ALT   Date Value Ref Range Status   01/02/2023 7 (L) 10 - 44 U/L Final   12/26/2022 13 10 - 44 U/L Final   12/19/2022 13 10 - 44 U/L Final       AST   Date Value Ref Range Status   01/02/2023 11 10 - 40 U/L Final   12/26/2022 11 10 - 40 U/L Final   12/19/2022 13 10 - 40 U/L Final       Total Bilirubin   Date Value  Ref Range Status   01/02/2023 0.3 0.1 - 1.0 mg/dL Final     Comment:     For infants and newborns, interpretation of results should be based  on gestational age, weight and in agreement with clinical  observations.    Premature Infant recommended reference ranges:  Up to 24 hours.............<8.0 mg/dL  Up to 48 hours............<12.0 mg/dL  3-5 days..................<15.0 mg/dL  6-29 days.................<15.0 mg/dL     12/26/2022 0.4 0.1 - 1.0 mg/dL Final     Comment:     For infants and newborns, interpretation of results should be based  on gestational age, weight and in agreement with clinical  observations.    Premature Infant recommended reference ranges:  Up to 24 hours.............<8.0 mg/dL  Up to 48 hours............<12.0 mg/dL  3-5 days..................<15.0 mg/dL  6-29 days.................<15.0 mg/dL     12/19/2022 0.2 0.1 - 1.0 mg/dL Final     Comment:     For infants and newborns, interpretation of results should be based  on gestational age, weight and in agreement with clinical  observations.    Premature Infant recommended reference ranges:  Up to 24 hours.............<8.0 mg/dL  Up to 48 hours............<12.0 mg/dL  3-5 days..................<15.0 mg/dL  6-29 days.................<15.0 mg/dL         WBC   Date Value Ref Range Status   01/02/2023 5.31 3.90 - 12.70 K/uL Final   12/26/2022 6.16 3.90 - 12.70 K/uL Final   12/19/2022 7.28 3.90 - 12.70 K/uL Final       Hemoglobin   Date Value Ref Range Status   01/02/2023 7.4 (L) 14.0 - 18.0 g/dL Final   12/26/2022 7.3 (L) 14.0 - 18.0 g/dL Final   12/19/2022 9.4 (L) 14.0 - 18.0 g/dL Final       POC Hematocrit   Date Value Ref Range Status   11/01/2019 44 36 - 54 %PCV Final     Hematocrit   Date Value Ref Range Status   01/02/2023 24.1 (L) 40.0 - 54.0 % Final   12/26/2022 23.4 (L) 40.0 - 54.0 % Final   12/19/2022 30.9 (L) 40.0 - 54.0 % Final       MCV   Date Value Ref Range Status   01/02/2023 83 82 - 98 fL Final   12/26/2022 80 (L) 82 - 98 fL Final    12/19/2022 82 82 - 98 fL Final       Platelets   Date Value Ref Range Status   01/02/2023 229 150 - 450 K/uL Final   12/26/2022 SEE COMMENT 150 - 450 K/uL Final     Comment:     Unable to report platelet count due to clumping.   12/19/2022 321 150 - 450 K/uL Final       Lab Results   Component Value Date    CHOL 110 03/26/2020       Lab Results   Component Value Date    HDL 33 (L) 03/26/2020       Lab Results   Component Value Date    LDLCALC 59 03/26/2020       Lab Results   Component Value Date    TRIG 92 03/26/2020       Lab Results   Component Value Date    CHOLHDL 3.33 03/26/2020       RPR   Date Value Ref Range Status   12/07/2022 Non-reactive Non-reactive Final     No results found for: QUANTIFERON    Medications:  Current Outpatient Medications on File Prior to Visit   Medication Sig Dispense Refill    cefTRIAXone (ROCEPHIN) 2 gram/50 mL IVPB Inject 50 mLs (2 g total) into the vein Daily. Inject via PICC line      DESCOVY 200-25 mg Tab Take 1 tablet by mouth once daily.      estradioL (ESTRACE) 1 MG tablet Take 1 mg by mouth once daily.      insulin aspart U-100 (NOVOLOG) 100 unit/mL injection Inject 10 Units into the skin 3 (three) times daily before meals.      insulin glargine 100 units/mL (3mL) SubQ pen Inject 50 Units into the skin every evening. (Patient taking differently: Inject 60 Units into the skin every evening.) 15 mL 0    spironolactone (ALDACTONE) 25 MG tablet Take 25 mg by mouth once daily.       No current facility-administered medications on file prior to visit.       Antibiotics:   Antibiotics (From admission, onward)      None            HIV: No components found for: HIV 1/2 AG/AB  Hepatitis C IgG: No components found for: HEPATITIS C  Syphilis:   RPR   Date Value Ref Range Status   12/07/2022 Non-reactive Non-reactive Final       Hepatitis A IgG: No components found for: HEPATITIS A IGG  Hepatitis Bc IgG: No components found for: HEPATITIS B CORE IGG  Hepatitis Bs IgG:  Quantiferon: No  results found for: QUANTIFERON  VZV IgG: No components found for: VARICELLA IGG    No components found for: SEDIMENTATION RATE  No components found for: C-REACTIVE PROTEIN      Microbiology x 7d:   Microbiology Results (last 7 days)       ** No results found for the last 168 hours. **              There is no immunization history on file for this patient.      Reviewed records today as well as relevant labs, cultures, and imaging    Assessment:     Stump wound de-hiscense   Strep bacteremia  T1DM, under better control (AIC >14% --> 7.7!)    No toxicities from antimicrobials  Extremely medically complex  Patient is high risk for infectious complications given diabetes     Plan:     Sent culture of wound drainage.     Add po doxcycline. Will adjust antibiotics based on culture results.    Needs close ortho follow up    F/u with [facility] for primary care and HIV prophylaxis/prevention    - Will monitor drug therapy for toxicity      - The following labs were ordered:  Orders Placed This Encounter   Procedures    CULTURE, AEROBIC  (SPECIFY SOURCE)          CULTURE, AEROBIC  (SPECIFY SOURCE)          CULTURE, ANAEROBE          CULTURE, ANAEROBE               I have sent communication to the referring physician and/or primary care provider.     I spent a total of 40 minutes on the day of the visit. This includes face to face time and non-face to face time preparing to see the patient (eg, review of tests), obtaining and/or reviewing separately obtained history, documenting clinical information in the electronic or other health record, independently interpreting results, and communicating results to the patient/family/caregiver, or care coordination.      Ami Gillis MD, MPH  Infectious Disease

## 2023-01-06 ENCOUNTER — EXTERNAL HOME HEALTH (OUTPATIENT)
Dept: HOME HEALTH SERVICES | Facility: HOSPITAL | Age: 28
End: 2023-01-06
Payer: MEDICARE

## 2023-01-07 LAB
BACTERIA SPEC AEROBE CULT: ABNORMAL
BACTERIA SPEC AEROBE CULT: ABNORMAL
BACTERIA SPEC ANAEROBE CULT: NORMAL
BACTERIA SPEC ANAEROBE CULT: NORMAL

## 2023-01-09 ENCOUNTER — LAB VISIT (OUTPATIENT)
Dept: LAB | Facility: HOSPITAL | Age: 28
End: 2023-01-09
Attending: INTERNAL MEDICINE
Payer: MEDICARE

## 2023-01-09 DIAGNOSIS — M86.9 INFLAMMATION OF BONE: ICD-10-CM

## 2023-01-09 DIAGNOSIS — T81.43XA POSTOPERATIVE INTRA-ABDOMINAL ABSCESS: ICD-10-CM

## 2023-01-09 DIAGNOSIS — J18.9 UNRESOLVED PNEUMONIA: ICD-10-CM

## 2023-01-09 DIAGNOSIS — A40.8 SEPTICEMIA DUE TO ANAEROBIC STREPTOCOCCI: Primary | ICD-10-CM

## 2023-01-09 LAB
ALBUMIN SERPL BCP-MCNC: 1.9 G/DL (ref 3.5–5.2)
ALP SERPL-CCNC: 102 U/L (ref 55–135)
ALT SERPL W/O P-5'-P-CCNC: 7 U/L (ref 10–44)
ANION GAP SERPL CALC-SCNC: 4 MMOL/L (ref 8–16)
AST SERPL-CCNC: 10 U/L (ref 10–40)
BASOPHILS # BLD AUTO: 0.01 K/UL (ref 0–0.2)
BASOPHILS NFR BLD: 0.2 % (ref 0–1.9)
BILIRUB SERPL-MCNC: 0.4 MG/DL (ref 0.1–1)
BUN SERPL-MCNC: 14 MG/DL (ref 6–20)
CALCIUM SERPL-MCNC: 7.9 MG/DL (ref 8.7–10.5)
CHLORIDE SERPL-SCNC: 108 MMOL/L (ref 95–110)
CO2 SERPL-SCNC: 26 MMOL/L (ref 23–29)
CREAT SERPL-MCNC: 1.4 MG/DL (ref 0.5–1.4)
CRP SERPL-MCNC: 27.4 MG/L (ref 0–8.2)
DIFFERENTIAL METHOD: ABNORMAL
EOSINOPHIL # BLD AUTO: 0.2 K/UL (ref 0–0.5)
EOSINOPHIL NFR BLD: 4.1 % (ref 0–8)
ERYTHROCYTE [DISTWIDTH] IN BLOOD BY AUTOMATED COUNT: 18.4 % (ref 11.5–14.5)
EST. GFR  (NO RACE VARIABLE): >60 ML/MIN/1.73 M^2
GLUCOSE SERPL-MCNC: 138 MG/DL (ref 70–110)
HCT VFR BLD AUTO: 21.9 % (ref 40–54)
HGB BLD-MCNC: 6.7 G/DL (ref 14–18)
IMM GRANULOCYTES # BLD AUTO: 0 K/UL (ref 0–0.04)
IMM GRANULOCYTES NFR BLD AUTO: 0 % (ref 0–0.5)
LYMPHOCYTES # BLD AUTO: 1.8 K/UL (ref 1–4.8)
LYMPHOCYTES NFR BLD: 37.1 % (ref 18–48)
MCH RBC QN AUTO: 25.4 PG (ref 27–31)
MCHC RBC AUTO-ENTMCNC: 30.6 G/DL (ref 32–36)
MCV RBC AUTO: 83 FL (ref 82–98)
MONOCYTES # BLD AUTO: 0.5 K/UL (ref 0.3–1)
MONOCYTES NFR BLD: 10 % (ref 4–15)
NEUTROPHILS # BLD AUTO: 2.4 K/UL (ref 1.8–7.7)
NEUTROPHILS NFR BLD: 48.6 % (ref 38–73)
NRBC BLD-RTO: 0 /100 WBC
PLATELET # BLD AUTO: 218 K/UL (ref 150–450)
PMV BLD AUTO: 11.3 FL (ref 9.2–12.9)
POTASSIUM SERPL-SCNC: 3.6 MMOL/L (ref 3.5–5.1)
PROT SERPL-MCNC: 7.5 G/DL (ref 6–8.4)
RBC # BLD AUTO: 2.64 M/UL (ref 4.6–6.2)
SODIUM SERPL-SCNC: 138 MMOL/L (ref 136–145)
WBC # BLD AUTO: 4.9 K/UL (ref 3.9–12.7)

## 2023-01-09 PROCEDURE — 86140 C-REACTIVE PROTEIN: CPT | Performed by: INTERNAL MEDICINE

## 2023-01-09 PROCEDURE — 85025 COMPLETE CBC W/AUTO DIFF WBC: CPT | Performed by: INTERNAL MEDICINE

## 2023-01-09 PROCEDURE — 80053 COMPREHEN METABOLIC PANEL: CPT | Performed by: INTERNAL MEDICINE

## 2023-01-10 ENCOUNTER — TELEPHONE (OUTPATIENT)
Dept: INFECTIOUS DISEASES | Facility: HOSPITAL | Age: 28
End: 2023-01-10
Payer: MEDICARE

## 2023-01-10 LAB
BACTERIA SPEC AEROBE CULT: ABNORMAL
BACTERIA SPEC AEROBE CULT: ABNORMAL

## 2023-01-10 NOTE — TELEPHONE ENCOUNTER
----- Message from Nba Torres LPN sent at 1/9/2023  3:34 PM CST -----  I sent high priority message to Dr Tolliver staff to re: appt access  ----- Message -----  From: Ami Gillis MD  Sent: 1/9/2023   3:11 PM CST  To: Nba Torres LPN    Can you help get this patient into see Dr Tolliver with ortho asap? He has a stump wound infection with dehiscence and he likely needs washout again

## 2023-01-16 ENCOUNTER — LAB VISIT (OUTPATIENT)
Dept: LAB | Facility: HOSPITAL | Age: 28
End: 2023-01-16
Attending: NURSE PRACTITIONER
Payer: MEDICARE

## 2023-01-16 DIAGNOSIS — M65.9 SAPHO SYNDROME: ICD-10-CM

## 2023-01-16 DIAGNOSIS — L70.9 SAPHO SYNDROME: ICD-10-CM

## 2023-01-16 DIAGNOSIS — A40.8 SEPTICEMIA DUE TO ANAEROBIC STREPTOCOCCI: Primary | ICD-10-CM

## 2023-01-16 DIAGNOSIS — T81.43XA POSTOPERATIVE INTRA-ABDOMINAL ABSCESS: ICD-10-CM

## 2023-01-16 DIAGNOSIS — E87.0 TYPE I DIABETES MELLITUS WITH HYPEROSMOLAR COMA: ICD-10-CM

## 2023-01-16 DIAGNOSIS — L40.3 SAPHO SYNDROME: ICD-10-CM

## 2023-01-16 DIAGNOSIS — E10.69 TYPE I DIABETES MELLITUS WITH HYPEROSMOLAR COMA: ICD-10-CM

## 2023-01-16 DIAGNOSIS — M86.9 SAPHO SYNDROME: ICD-10-CM

## 2023-01-16 DIAGNOSIS — E10.65 TYPE I DIABETES MELLITUS WITH HYPEROSMOLAR COMA: ICD-10-CM

## 2023-01-16 DIAGNOSIS — M85.80 SAPHO SYNDROME: ICD-10-CM

## 2023-01-16 LAB
ALBUMIN SERPL BCP-MCNC: 2.1 G/DL (ref 3.5–5.2)
ALP SERPL-CCNC: 105 U/L (ref 55–135)
ALT SERPL W/O P-5'-P-CCNC: 8 U/L (ref 10–44)
ANION GAP SERPL CALC-SCNC: 7 MMOL/L (ref 8–16)
AST SERPL-CCNC: 11 U/L (ref 10–40)
BASOPHILS # BLD AUTO: 0.02 K/UL (ref 0–0.2)
BASOPHILS NFR BLD: 0.4 % (ref 0–1.9)
BILIRUB SERPL-MCNC: 0.4 MG/DL (ref 0.1–1)
BUN SERPL-MCNC: 17 MG/DL (ref 6–20)
CALCIUM SERPL-MCNC: 8.2 MG/DL (ref 8.7–10.5)
CHLORIDE SERPL-SCNC: 111 MMOL/L (ref 95–110)
CO2 SERPL-SCNC: 23 MMOL/L (ref 23–29)
CREAT SERPL-MCNC: 1.6 MG/DL (ref 0.5–1.4)
CRP SERPL-MCNC: 25.8 MG/L (ref 0–8.2)
DIFFERENTIAL METHOD: ABNORMAL
EOSINOPHIL # BLD AUTO: 0.2 K/UL (ref 0–0.5)
EOSINOPHIL NFR BLD: 4.6 % (ref 0–8)
ERYTHROCYTE [DISTWIDTH] IN BLOOD BY AUTOMATED COUNT: 17.5 % (ref 11.5–14.5)
ERYTHROCYTE [SEDIMENTATION RATE] IN BLOOD BY WESTERGREN METHOD: 46 MM/HR (ref 0–10)
EST. GFR  (NO RACE VARIABLE): >60 ML/MIN/1.73 M^2
GLUCOSE SERPL-MCNC: 153 MG/DL (ref 70–110)
HCT VFR BLD AUTO: 31.7 % (ref 40–54)
HGB BLD-MCNC: 10.3 G/DL (ref 14–18)
IMM GRANULOCYTES # BLD AUTO: 0.01 K/UL (ref 0–0.04)
IMM GRANULOCYTES NFR BLD AUTO: 0.2 % (ref 0–0.5)
LYMPHOCYTES # BLD AUTO: 1.8 K/UL (ref 1–4.8)
LYMPHOCYTES NFR BLD: 39.5 % (ref 18–48)
MCH RBC QN AUTO: 26.1 PG (ref 27–31)
MCHC RBC AUTO-ENTMCNC: 32.5 G/DL (ref 32–36)
MCV RBC AUTO: 81 FL (ref 82–98)
MONOCYTES # BLD AUTO: 0.3 K/UL (ref 0.3–1)
MONOCYTES NFR BLD: 6.8 % (ref 4–15)
NEUTROPHILS # BLD AUTO: 2.2 K/UL (ref 1.8–7.7)
NEUTROPHILS NFR BLD: 48.5 % (ref 38–73)
NRBC BLD-RTO: 0 /100 WBC
PLATELET # BLD AUTO: 229 K/UL (ref 150–450)
PMV BLD AUTO: 11 FL (ref 9.2–12.9)
POTASSIUM SERPL-SCNC: 3.9 MMOL/L (ref 3.5–5.1)
PREALB SERPL-MCNC: 12 MG/DL (ref 20–43)
PROT SERPL-MCNC: 8.2 G/DL (ref 6–8.4)
RBC # BLD AUTO: 3.94 M/UL (ref 4.6–6.2)
SODIUM SERPL-SCNC: 141 MMOL/L (ref 136–145)
WBC # BLD AUTO: 4.58 K/UL (ref 3.9–12.7)

## 2023-01-16 PROCEDURE — 85025 COMPLETE CBC W/AUTO DIFF WBC: CPT | Performed by: NURSE PRACTITIONER

## 2023-01-16 PROCEDURE — 86140 C-REACTIVE PROTEIN: CPT | Performed by: NURSE PRACTITIONER

## 2023-01-16 PROCEDURE — 83036 HEMOGLOBIN GLYCOSYLATED A1C: CPT | Performed by: NURSE PRACTITIONER

## 2023-01-16 PROCEDURE — 84134 ASSAY OF PREALBUMIN: CPT | Performed by: NURSE PRACTITIONER

## 2023-01-16 PROCEDURE — 85652 RBC SED RATE AUTOMATED: CPT | Performed by: NURSE PRACTITIONER

## 2023-01-16 PROCEDURE — 80053 COMPREHEN METABOLIC PANEL: CPT | Performed by: NURSE PRACTITIONER

## 2023-01-17 LAB
ESTIMATED AVG GLUCOSE: 157 MG/DL (ref 68–131)
HBA1C MFR BLD: 7.1 % (ref 4–5.6)

## 2023-01-23 ENCOUNTER — LAB VISIT (OUTPATIENT)
Dept: LAB | Facility: HOSPITAL | Age: 28
End: 2023-01-23
Attending: STUDENT IN AN ORGANIZED HEALTH CARE EDUCATION/TRAINING PROGRAM
Payer: MEDICARE

## 2023-01-23 DIAGNOSIS — E10.69: ICD-10-CM

## 2023-01-23 DIAGNOSIS — A40.8 SEPTICEMIA DUE TO ANAEROBIC STREPTOCOCCI: Primary | ICD-10-CM

## 2023-01-23 LAB
ALBUMIN SERPL BCP-MCNC: 2.3 G/DL (ref 3.5–5.2)
ALP SERPL-CCNC: 121 U/L (ref 55–135)
ALT SERPL W/O P-5'-P-CCNC: 10 U/L (ref 10–44)
ANION GAP SERPL CALC-SCNC: 3 MMOL/L (ref 8–16)
AST SERPL-CCNC: 16 U/L (ref 10–40)
BASOPHILS # BLD AUTO: 0.04 K/UL (ref 0–0.2)
BASOPHILS NFR BLD: 0.6 % (ref 0–1.9)
BILIRUB SERPL-MCNC: 0.4 MG/DL (ref 0.1–1)
BUN SERPL-MCNC: 14 MG/DL (ref 6–20)
CALCIUM SERPL-MCNC: 8.4 MG/DL (ref 8.7–10.5)
CHLORIDE SERPL-SCNC: 113 MMOL/L (ref 95–110)
CO2 SERPL-SCNC: 24 MMOL/L (ref 23–29)
CREAT SERPL-MCNC: 1.4 MG/DL (ref 0.5–1.4)
CRP SERPL-MCNC: 5.2 MG/L (ref 0–8.2)
DIFFERENTIAL METHOD: ABNORMAL
EOSINOPHIL # BLD AUTO: 0.3 K/UL (ref 0–0.5)
EOSINOPHIL NFR BLD: 4.7 % (ref 0–8)
ERYTHROCYTE [DISTWIDTH] IN BLOOD BY AUTOMATED COUNT: 18.4 % (ref 11.5–14.5)
EST. GFR  (NO RACE VARIABLE): >60 ML/MIN/1.73 M^2
GLUCOSE SERPL-MCNC: 137 MG/DL (ref 70–110)
HCT VFR BLD AUTO: 26.6 % (ref 40–54)
HGB BLD-MCNC: 8.4 G/DL (ref 14–18)
IMM GRANULOCYTES # BLD AUTO: 0.02 K/UL (ref 0–0.04)
IMM GRANULOCYTES NFR BLD AUTO: 0.3 % (ref 0–0.5)
LYMPHOCYTES # BLD AUTO: 2.4 K/UL (ref 1–4.8)
LYMPHOCYTES NFR BLD: 37.2 % (ref 18–48)
MCH RBC QN AUTO: 25.9 PG (ref 27–31)
MCHC RBC AUTO-ENTMCNC: 31.6 G/DL (ref 32–36)
MCV RBC AUTO: 82 FL (ref 82–98)
MONOCYTES # BLD AUTO: 0.5 K/UL (ref 0.3–1)
MONOCYTES NFR BLD: 7.2 % (ref 4–15)
NEUTROPHILS # BLD AUTO: 3.2 K/UL (ref 1.8–7.7)
NEUTROPHILS NFR BLD: 50 % (ref 38–73)
NRBC BLD-RTO: 0 /100 WBC
PLATELET # BLD AUTO: 329 K/UL (ref 150–450)
PMV BLD AUTO: 11 FL (ref 9.2–12.9)
POTASSIUM SERPL-SCNC: 4 MMOL/L (ref 3.5–5.1)
PROT SERPL-MCNC: 8.3 G/DL (ref 6–8.4)
RBC # BLD AUTO: 3.24 M/UL (ref 4.6–6.2)
SODIUM SERPL-SCNC: 140 MMOL/L (ref 136–145)
WBC # BLD AUTO: 6.4 K/UL (ref 3.9–12.7)

## 2023-01-23 PROCEDURE — 80053 COMPREHEN METABOLIC PANEL: CPT | Performed by: STUDENT IN AN ORGANIZED HEALTH CARE EDUCATION/TRAINING PROGRAM

## 2023-01-23 PROCEDURE — 86140 C-REACTIVE PROTEIN: CPT | Performed by: STUDENT IN AN ORGANIZED HEALTH CARE EDUCATION/TRAINING PROGRAM

## 2023-01-23 PROCEDURE — 85025 COMPLETE CBC W/AUTO DIFF WBC: CPT | Performed by: STUDENT IN AN ORGANIZED HEALTH CARE EDUCATION/TRAINING PROGRAM

## 2023-01-30 ENCOUNTER — LAB VISIT (OUTPATIENT)
Dept: LAB | Facility: HOSPITAL | Age: 28
End: 2023-01-30
Attending: NURSE PRACTITIONER
Payer: MEDICARE

## 2023-01-30 DIAGNOSIS — A40.9 STREPTOCOCCAL SEPSIS: Primary | ICD-10-CM

## 2023-01-30 DIAGNOSIS — T81.43XA INFECTION FOLLOWING A PROCEDURE, ORGAN AND SPACE SURGICAL SITE, INITIAL ENCOUNTER: ICD-10-CM

## 2023-01-30 DIAGNOSIS — E10.69 TYPE 1 DIABETES MELLITUS WITH OTHER SPECIFIED COMPLICATION: ICD-10-CM

## 2023-01-30 DIAGNOSIS — D50.9 IRON DEFICIENCY ANEMIA: ICD-10-CM

## 2023-01-30 LAB
ALBUMIN SERPL BCP-MCNC: 2.1 G/DL (ref 3.5–5.2)
ALP SERPL-CCNC: 109 U/L (ref 55–135)
ALT SERPL W/O P-5'-P-CCNC: 9 U/L (ref 10–44)
ANION GAP SERPL CALC-SCNC: 4 MMOL/L (ref 8–16)
AST SERPL-CCNC: 11 U/L (ref 10–40)
BASOPHILS # BLD AUTO: 0.03 K/UL (ref 0–0.2)
BASOPHILS NFR BLD: 0.6 % (ref 0–1.9)
BILIRUB SERPL-MCNC: 0.7 MG/DL (ref 0.1–1)
BUN SERPL-MCNC: 13 MG/DL (ref 6–20)
CALCIUM SERPL-MCNC: 8.1 MG/DL (ref 8.7–10.5)
CHLORIDE SERPL-SCNC: 116 MMOL/L (ref 95–110)
CO2 SERPL-SCNC: 21 MMOL/L (ref 23–29)
CREAT SERPL-MCNC: 1.3 MG/DL (ref 0.5–1.4)
CRP SERPL-MCNC: 3.4 MG/L (ref 0–8.2)
DIFFERENTIAL METHOD: ABNORMAL
EOSINOPHIL # BLD AUTO: 0.3 K/UL (ref 0–0.5)
EOSINOPHIL NFR BLD: 6.1 % (ref 0–8)
ERYTHROCYTE [DISTWIDTH] IN BLOOD BY AUTOMATED COUNT: 18.7 % (ref 11.5–14.5)
ERYTHROCYTE [SEDIMENTATION RATE] IN BLOOD BY WESTERGREN METHOD: 42 MM/HR (ref 0–10)
EST. GFR  (NO RACE VARIABLE): >60 ML/MIN/1.73 M^2
GLUCOSE SERPL-MCNC: 150 MG/DL (ref 70–110)
HCT VFR BLD AUTO: 26.3 % (ref 40–54)
HGB BLD-MCNC: 8.6 G/DL (ref 14–18)
IMM GRANULOCYTES # BLD AUTO: 0.01 K/UL (ref 0–0.04)
IMM GRANULOCYTES NFR BLD AUTO: 0.2 % (ref 0–0.5)
LYMPHOCYTES # BLD AUTO: 2.2 K/UL (ref 1–4.8)
LYMPHOCYTES NFR BLD: 43.2 % (ref 18–48)
MCH RBC QN AUTO: 27 PG (ref 27–31)
MCHC RBC AUTO-ENTMCNC: 32.7 G/DL (ref 32–36)
MCV RBC AUTO: 82 FL (ref 82–98)
MONOCYTES # BLD AUTO: 0.4 K/UL (ref 0.3–1)
MONOCYTES NFR BLD: 8.4 % (ref 4–15)
NEUTROPHILS # BLD AUTO: 2.1 K/UL (ref 1.8–7.7)
NEUTROPHILS NFR BLD: 41.5 % (ref 38–73)
NRBC BLD-RTO: 0 /100 WBC
PLATELET # BLD AUTO: 264 K/UL (ref 150–450)
PMV BLD AUTO: 11.2 FL (ref 9.2–12.9)
POTASSIUM SERPL-SCNC: 3.5 MMOL/L (ref 3.5–5.1)
PREALB SERPL-MCNC: 13 MG/DL (ref 20–43)
PROT SERPL-MCNC: 7.5 G/DL (ref 6–8.4)
RBC # BLD AUTO: 3.19 M/UL (ref 4.6–6.2)
SODIUM SERPL-SCNC: 141 MMOL/L (ref 136–145)
WBC # BLD AUTO: 5.09 K/UL (ref 3.9–12.7)

## 2023-01-30 PROCEDURE — 86140 C-REACTIVE PROTEIN: CPT | Performed by: NURSE PRACTITIONER

## 2023-01-30 PROCEDURE — 85025 COMPLETE CBC W/AUTO DIFF WBC: CPT | Performed by: NURSE PRACTITIONER

## 2023-01-30 PROCEDURE — 80053 COMPREHEN METABOLIC PANEL: CPT | Performed by: NURSE PRACTITIONER

## 2023-01-30 PROCEDURE — 85652 RBC SED RATE AUTOMATED: CPT | Performed by: NURSE PRACTITIONER

## 2023-01-30 PROCEDURE — 83036 HEMOGLOBIN GLYCOSYLATED A1C: CPT | Performed by: NURSE PRACTITIONER

## 2023-01-30 PROCEDURE — 84134 ASSAY OF PREALBUMIN: CPT | Performed by: NURSE PRACTITIONER

## 2023-01-31 LAB
ESTIMATED AVG GLUCOSE: 143 MG/DL (ref 68–131)
HBA1C MFR BLD: 6.6 % (ref 4–5.6)

## 2023-02-01 ENCOUNTER — TELEPHONE (OUTPATIENT)
Dept: PULMONOLOGY | Facility: CLINIC | Age: 28
End: 2023-02-01
Payer: MEDICARE

## 2023-02-01 NOTE — TELEPHONE ENCOUNTER
Returned call no answer.                ----- Message from Ivy Mccullough MA sent at 2/1/2023  8:31 AM CST -----  Mesha TAVERA [facility] Infectious Disease Clinical Support Staff  Caller: [phone] (Today,  8:27 AM)  Pt rescheduled appt due to [employer] leaving. [employer] was scheduled through his insurance. Pt requesting to transfer to [facility] due to receiving other care at the location.   Please call and adv @ [phone]

## 2023-02-06 ENCOUNTER — LAB VISIT (OUTPATIENT)
Dept: LAB | Facility: HOSPITAL | Age: 28
End: 2023-02-06
Attending: NURSE PRACTITIONER
Payer: MEDICARE

## 2023-02-06 DIAGNOSIS — T81.31XA RUPTURE OF OPERATION WOUND: ICD-10-CM

## 2023-02-06 DIAGNOSIS — M86.9 INFLAMMATION OF BONE: Primary | ICD-10-CM

## 2023-02-06 DIAGNOSIS — T81.43XA POSTOPERATIVE INTRA-ABDOMINAL ABSCESS: ICD-10-CM

## 2023-02-06 DIAGNOSIS — E10.69: ICD-10-CM

## 2023-02-06 LAB
ALBUMIN SERPL BCP-MCNC: 2.4 G/DL (ref 3.5–5.2)
ALP SERPL-CCNC: 117 U/L (ref 55–135)
ALT SERPL W/O P-5'-P-CCNC: 6 U/L (ref 10–44)
ANION GAP SERPL CALC-SCNC: 7 MMOL/L (ref 8–16)
AST SERPL-CCNC: 13 U/L (ref 10–40)
BASOPHILS # BLD AUTO: 0.03 K/UL (ref 0–0.2)
BASOPHILS NFR BLD: 0.6 % (ref 0–1.9)
BILIRUB SERPL-MCNC: 0.7 MG/DL (ref 0.1–1)
BUN SERPL-MCNC: 18 MG/DL (ref 6–20)
CALCIUM SERPL-MCNC: 8.4 MG/DL (ref 8.7–10.5)
CHLORIDE SERPL-SCNC: 110 MMOL/L (ref 95–110)
CO2 SERPL-SCNC: 25 MMOL/L (ref 23–29)
CREAT SERPL-MCNC: 1.7 MG/DL (ref 0.5–1.4)
CRP SERPL-MCNC: 9.4 MG/L (ref 0–8.2)
DIFFERENTIAL METHOD: ABNORMAL
EOSINOPHIL # BLD AUTO: 0.2 K/UL (ref 0–0.5)
EOSINOPHIL NFR BLD: 3.8 % (ref 0–8)
ERYTHROCYTE [DISTWIDTH] IN BLOOD BY AUTOMATED COUNT: 17.7 % (ref 11.5–14.5)
ERYTHROCYTE [SEDIMENTATION RATE] IN BLOOD BY WESTERGREN METHOD: 57 MM/HR (ref 0–10)
EST. GFR  (NO RACE VARIABLE): 56 ML/MIN/1.73 M^2
ESTIMATED AVG GLUCOSE: 137 MG/DL (ref 68–131)
GLUCOSE SERPL-MCNC: 178 MG/DL (ref 70–110)
HBA1C MFR BLD: 6.4 % (ref 4–5.6)
HCT VFR BLD AUTO: 32.2 % (ref 40–54)
HGB BLD-MCNC: 10.1 G/DL (ref 14–18)
IMM GRANULOCYTES # BLD AUTO: 0.01 K/UL (ref 0–0.04)
IMM GRANULOCYTES NFR BLD AUTO: 0.2 % (ref 0–0.5)
LYMPHOCYTES # BLD AUTO: 2.4 K/UL (ref 1–4.8)
LYMPHOCYTES NFR BLD: 45.7 % (ref 18–48)
MCH RBC QN AUTO: 26.1 PG (ref 27–31)
MCHC RBC AUTO-ENTMCNC: 31.4 G/DL (ref 32–36)
MCV RBC AUTO: 83 FL (ref 82–98)
MONOCYTES # BLD AUTO: 0.3 K/UL (ref 0.3–1)
MONOCYTES NFR BLD: 6.2 % (ref 4–15)
NEUTROPHILS # BLD AUTO: 2.3 K/UL (ref 1.8–7.7)
NEUTROPHILS NFR BLD: 43.5 % (ref 38–73)
NRBC BLD-RTO: 0 /100 WBC
PLATELET # BLD AUTO: 248 K/UL (ref 150–450)
PMV BLD AUTO: 11.1 FL (ref 9.2–12.9)
POTASSIUM SERPL-SCNC: 3.8 MMOL/L (ref 3.5–5.1)
PREALB SERPL-MCNC: 15 MG/DL (ref 20–43)
PROT SERPL-MCNC: 8.1 G/DL (ref 6–8.4)
RBC # BLD AUTO: 3.87 M/UL (ref 4.6–6.2)
SODIUM SERPL-SCNC: 142 MMOL/L (ref 136–145)
WBC # BLD AUTO: 5.29 K/UL (ref 3.9–12.7)

## 2023-02-06 PROCEDURE — 83036 HEMOGLOBIN GLYCOSYLATED A1C: CPT | Performed by: NURSE PRACTITIONER

## 2023-02-06 PROCEDURE — 85025 COMPLETE CBC W/AUTO DIFF WBC: CPT | Performed by: NURSE PRACTITIONER

## 2023-02-06 PROCEDURE — 84134 ASSAY OF PREALBUMIN: CPT | Performed by: NURSE PRACTITIONER

## 2023-02-06 PROCEDURE — 85652 RBC SED RATE AUTOMATED: CPT | Performed by: NURSE PRACTITIONER

## 2023-02-06 PROCEDURE — 86140 C-REACTIVE PROTEIN: CPT | Performed by: NURSE PRACTITIONER

## 2023-02-06 PROCEDURE — 80053 COMPREHEN METABOLIC PANEL: CPT | Performed by: NURSE PRACTITIONER

## 2023-03-01 ENCOUNTER — OFFICE VISIT (OUTPATIENT)
Dept: INFECTIOUS DISEASES | Facility: CLINIC | Age: 28
End: 2023-03-01
Payer: MEDICARE

## 2023-03-01 VITALS
HEIGHT: 72 IN | SYSTOLIC BLOOD PRESSURE: 148 MMHG | BODY MASS INDEX: 34.52 KG/M2 | HEART RATE: 88 BPM | OXYGEN SATURATION: 99 % | WEIGHT: 254.88 LBS | DIASTOLIC BLOOD PRESSURE: 112 MMHG

## 2023-03-01 DIAGNOSIS — R78.81 STREPTOCOCCAL BACTEREMIA: ICD-10-CM

## 2023-03-01 DIAGNOSIS — F64.0 TRANSGENDER WOMAN ON HORMONE THERAPY: ICD-10-CM

## 2023-03-01 DIAGNOSIS — Z78.9 HEALTH CARE HOME, ACTIVE CARE COORDINATION: ICD-10-CM

## 2023-03-01 DIAGNOSIS — Z79.2 RECEIVING INTRAVENOUS ANTIBIOTIC TREATMENT AT HOME: ICD-10-CM

## 2023-03-01 DIAGNOSIS — Z79.899 TRANSGENDER WOMAN ON HORMONE THERAPY: ICD-10-CM

## 2023-03-01 DIAGNOSIS — M86.9 OSTEOMYELITIS, UNSPECIFIED SITE, UNSPECIFIED TYPE: Primary | ICD-10-CM

## 2023-03-01 DIAGNOSIS — E10.65 TYPE 1 DIABETES MELLITUS WITH HYPERGLYCEMIA: ICD-10-CM

## 2023-03-01 DIAGNOSIS — B95.5 STREPTOCOCCAL BACTEREMIA: ICD-10-CM

## 2023-03-01 DIAGNOSIS — Z51.81 THERAPEUTIC DRUG MONITORING: ICD-10-CM

## 2023-03-01 PROCEDURE — 3008F PR BODY MASS INDEX (BMI) DOCUMENTED: ICD-10-PCS | Mod: CPTII,S$GLB,, | Performed by: INTERNAL MEDICINE

## 2023-03-01 PROCEDURE — 99215 OFFICE O/P EST HI 40 MIN: CPT | Mod: S$GLB,,, | Performed by: INTERNAL MEDICINE

## 2023-03-01 PROCEDURE — 3080F PR MOST RECENT DIASTOLIC BLOOD PRESSURE >= 90 MM HG: ICD-10-PCS | Mod: CPTII,S$GLB,, | Performed by: INTERNAL MEDICINE

## 2023-03-01 PROCEDURE — 99999 PR PBB SHADOW E&M-EST. PATIENT-LVL III: ICD-10-PCS | Mod: PBBFAC,,,

## 2023-03-01 PROCEDURE — 1159F PR MEDICATION LIST DOCUMENTED IN MEDICAL RECORD: ICD-10-PCS | Mod: CPTII,S$GLB,, | Performed by: INTERNAL MEDICINE

## 2023-03-01 PROCEDURE — 3077F SYST BP >= 140 MM HG: CPT | Mod: CPTII,S$GLB,, | Performed by: INTERNAL MEDICINE

## 2023-03-01 PROCEDURE — 3044F PR MOST RECENT HEMOGLOBIN A1C LEVEL <7.0%: ICD-10-PCS | Mod: CPTII,S$GLB,, | Performed by: INTERNAL MEDICINE

## 2023-03-01 PROCEDURE — 3044F HG A1C LEVEL LT 7.0%: CPT | Mod: CPTII,S$GLB,, | Performed by: INTERNAL MEDICINE

## 2023-03-01 PROCEDURE — 3077F PR MOST RECENT SYSTOLIC BLOOD PRESSURE >= 140 MM HG: ICD-10-PCS | Mod: CPTII,S$GLB,, | Performed by: INTERNAL MEDICINE

## 2023-03-01 PROCEDURE — 3008F BODY MASS INDEX DOCD: CPT | Mod: CPTII,S$GLB,, | Performed by: INTERNAL MEDICINE

## 2023-03-01 PROCEDURE — 99215 PR OFFICE/OUTPT VISIT, EST, LEVL V, 40-54 MIN: ICD-10-PCS | Mod: S$GLB,,, | Performed by: INTERNAL MEDICINE

## 2023-03-01 PROCEDURE — 99999 PR PBB SHADOW E&M-EST. PATIENT-LVL III: CPT | Mod: PBBFAC,,,

## 2023-03-01 PROCEDURE — 1159F MED LIST DOCD IN RCRD: CPT | Mod: CPTII,S$GLB,, | Performed by: INTERNAL MEDICINE

## 2023-03-01 PROCEDURE — 3080F DIAST BP >= 90 MM HG: CPT | Mod: CPTII,S$GLB,, | Performed by: INTERNAL MEDICINE

## 2023-03-01 NOTE — PROGRESS NOTES
INFECTIOUS DISEASE CLINIC  03/01/2023 9:06 AM    Subjective:      Chief Complaint: hospital follow up    History of Present Illness:    Patient Pratik Torres is a 27 y.o. adult with h/o T1DM, R BKA, IBS, dental caries who presents today for f/u stump infection    Brief history:   Was admitted 12/5 with fatigue /fall, found to have group G strep bacteremia. Stump wound bone was exposed and he underwent additional amputation and wound closure. Unfortunately no path/cultures were sent.  Was d/c'd on 4 weeks of IV ceftriaxone. Had fall, wound opened up and started draining. Wound swab grew esbl kleb and e.faecalis. was treated with meropenem      Since last appt, failed to keep several follow up appt and IV antibiotics were stopped and picc line removed. She also refused to follow up with ortho (says she sees wound care so doesn't want to go back to ortho)    Pt refuses to have dressings removed in clinic today.   Reports small hole, filling in  No fever    Spoke with home health nurse, she is worried about R stump wound approximating    Spoke with [facility]. They are concerned for healing of R stump. They are scheduled to see patient immediately after clinic and said that it's fine to have dressings removed, but pt refuses. Wound care says they can debride wound today and send wound swab of bone.             Component Ref Range & Units 2 d ago   (1/2/23) 9 d ago   (12/26/22) 2 wk ago   (12/19/22) 2 yr ago   (6/14/20) 2 yr ago   (6/12/20) 2 yr ago   (6/10/20) 2 yr ago   (6/8/20)   CRP 0.0 - 8.2 mg/L 15.2 High   16.2 High   8.3 High   9.1 High   17.9 High   46.4 High   94.3 High          Component Ref Range & Units 2 d ago 2 yr ago   Sed Rate 0 - 10 mm/Hr 85 High   >120 High  R           Review of Symptoms:  Constitutional: Denies fevers, chills, or weakness.  ENT: Denies dysphagia, nasal discharge, ear pain or discharge.  Cardiovascular: Denies chest pain, palpitations, orthopnea, or  claudication.  Respiratory: Denies shortness of breath, cough, hemoptysis, or wheezing.  GI: Denies nausea/vomitting, hematochezia, melena, abd pain, or changes in appetite.  : Denies dysuria, incontinence, or hematuria.  Musculoskeletal: as per hpi, stump wound  Skin/breast: Denies rashes, lumps, lesions, or discharge.  Neurologic: Denies headache, dizziness, vertigo, or paresthesias.    Past Medical History:   Diagnosis Date    Diabetes mellitus     IBS (irritable bowel syndrome)        Past Surgical History:   Procedure Laterality Date    BELOW KNEE AMPUTATION OF LOWER EXTREMITY Right 12/8/2022    Procedure: AMPUTATION, BELOW KNEE I&D /REVISION SAW/PULSA VAC;  Surgeon: Henry Tolliver MD;  Location: [facility] OR;  Service: Orthopedics;  Laterality: Right;    COLONOSCOPY      INCISION OF PERIRECTAL ABSCESS N/A 6/10/2020    Procedure: INCISION, ABSCESS, PERIRECTAL;  Surgeon: Ronald Santo MD;  Location: [facility] OR 2ND FLR;  Service: Colon and Rectal;  Laterality: N/A;  PATIENT IS COVID POSITIVE    TOE SURGERY      WOUND DEBRIDEMENT Right 6/10/2020    Procedure: DEBRIDEMENT, WOUND ISCHIORECTAL;  Surgeon: Ronald Santo MD;  Location: [facility] OR 2ND FLR;  Service: Colon and Rectal;  Laterality: Right;       History reviewed. No pertinent family history.    Social History     Socioeconomic History    Marital status: Single   Tobacco Use    Smoking status: Never    Smokeless tobacco: Never   Substance and Sexual Activity    Alcohol use: Yes     Comment: occasionally    Drug use: Not Currently       Review of patient's allergies indicates:   Allergen Reactions    Shrimp          Objective:   BP (!) 148/112   Pulse 88   Ht 6' (1.829 m)   Wt 115.6 kg (254 lb 13.6 oz)   SpO2 99%   BMI 34.56 kg/m²     General: Afebrile, alert, comfortable, no acute distress.   HEENT: TRISTAN. EOMI, no scleral icterus.   Pulmonary: Non labored,clear to auscultation A/P/L.   Cardiac: normal S1 & S2 w/o rubs/murmurs/gallops.   Abdominal:  Non-tender, non-distended.  Extremities: refuses to have dressing removed  Neurological:  Alert and oriented x 4.     Labs:    Glucose   Date Value Ref Range Status   02/06/2023 178 (H) 70 - 110 mg/dL Final   01/30/2023 150 (H) 70 - 110 mg/dL Final   01/23/2023 137 (H) 70 - 110 mg/dL Final       Calcium   Date Value Ref Range Status   02/06/2023 8.4 (L) 8.7 - 10.5 mg/dL Final   01/30/2023 8.1 (L) 8.7 - 10.5 mg/dL Final   01/23/2023 8.4 (L) 8.7 - 10.5 mg/dL Final       Albumin   Date Value Ref Range Status   02/06/2023 2.4 (L) 3.5 - 5.2 g/dL Final   01/30/2023 2.1 (L) 3.5 - 5.2 g/dL Final   01/23/2023 2.3 (L) 3.5 - 5.2 g/dL Final       Total Protein   Date Value Ref Range Status   02/06/2023 8.1 6.0 - 8.4 g/dL Final   01/30/2023 7.5 6.0 - 8.4 g/dL Final   01/23/2023 8.3 6.0 - 8.4 g/dL Final       Sodium   Date Value Ref Range Status   02/06/2023 142 136 - 145 mmol/L Final   01/30/2023 141 136 - 145 mmol/L Final   01/23/2023 140 136 - 145 mmol/L Final       Potassium   Date Value Ref Range Status   02/06/2023 3.8 3.5 - 5.1 mmol/L Final   01/30/2023 3.5 3.5 - 5.1 mmol/L Final   01/23/2023 4.0 3.5 - 5.1 mmol/L Final       CO2   Date Value Ref Range Status   02/06/2023 25 23 - 29 mmol/L Final   01/30/2023 21 (L) 23 - 29 mmol/L Final   01/23/2023 24 23 - 29 mmol/L Final       Chloride   Date Value Ref Range Status   02/06/2023 110 95 - 110 mmol/L Final   01/30/2023 116 (H) 95 - 110 mmol/L Final   01/23/2023 113 (H) 95 - 110 mmol/L Final       BUN   Date Value Ref Range Status   02/06/2023 18 6 - 20 mg/dL Final   01/30/2023 13 6 - 20 mg/dL Final   01/23/2023 14 6 - 20 mg/dL Final       Creatinine   Date Value Ref Range Status   02/06/2023 1.7 (H) 0.5 - 1.4 mg/dL Final   01/30/2023 1.3 0.5 - 1.4 mg/dL Final   01/23/2023 1.4 0.5 - 1.4 mg/dL Final       Alkaline Phosphatase   Date Value Ref Range Status   02/06/2023 117 55 - 135 U/L Final   01/30/2023 109 55 - 135 U/L Final   01/23/2023 121 55 - 135 U/L Final       ALT   Date  Value Ref Range Status   02/06/2023 6 (L) 10 - 44 U/L Final   01/30/2023 9 (L) 10 - 44 U/L Final   01/23/2023 10 10 - 44 U/L Final       AST   Date Value Ref Range Status   02/06/2023 13 10 - 40 U/L Final   01/30/2023 11 10 - 40 U/L Final   01/23/2023 16 10 - 40 U/L Final       Total Bilirubin   Date Value Ref Range Status   02/06/2023 0.7 0.1 - 1.0 mg/dL Final     Comment:     For infants and newborns, interpretation of results should be based  on gestational age, weight and in agreement with clinical  observations.    Premature Infant recommended reference ranges:  Up to 24 hours.............<8.0 mg/dL  Up to 48 hours............<12.0 mg/dL  3-5 days..................<15.0 mg/dL  6-29 days.................<15.0 mg/dL     01/30/2023 0.7 0.1 - 1.0 mg/dL Final     Comment:     For infants and newborns, interpretation of results should be based  on gestational age, weight and in agreement with clinical  observations.    Premature Infant recommended reference ranges:  Up to 24 hours.............<8.0 mg/dL  Up to 48 hours............<12.0 mg/dL  3-5 days..................<15.0 mg/dL  6-29 days.................<15.0 mg/dL     01/23/2023 0.4 0.1 - 1.0 mg/dL Final     Comment:     For infants and newborns, interpretation of results should be based  on gestational age, weight and in agreement with clinical  observations.    Premature Infant recommended reference ranges:  Up to 24 hours.............<8.0 mg/dL  Up to 48 hours............<12.0 mg/dL  3-5 days..................<15.0 mg/dL  6-29 days.................<15.0 mg/dL         WBC   Date Value Ref Range Status   02/06/2023 5.29 3.90 - 12.70 K/uL Final   01/30/2023 5.09 3.90 - 12.70 K/uL Final   01/23/2023 6.40 3.90 - 12.70 K/uL Final       Hemoglobin   Date Value Ref Range Status   02/06/2023 10.1 (L) 14.0 - 18.0 g/dL Final   01/30/2023 8.6 (L) 14.0 - 18.0 g/dL Final   01/23/2023 8.4 (L) 14.0 - 18.0 g/dL Final       POC Hematocrit   Date Value Ref Range Status    11/01/2019 44 36 - 54 %PCV Final     Hematocrit   Date Value Ref Range Status   02/06/2023 32.2 (L) 40.0 - 54.0 % Final   01/30/2023 26.3 (L) 40.0 - 54.0 % Final   01/23/2023 26.6 (L) 40.0 - 54.0 % Final       MCV   Date Value Ref Range Status   02/06/2023 83 82 - 98 fL Final   01/30/2023 82 82 - 98 fL Final   01/23/2023 82 82 - 98 fL Final       Platelets   Date Value Ref Range Status   02/06/2023 248 150 - 450 K/uL Final   01/30/2023 264 150 - 450 K/uL Final   01/23/2023 329 150 - 450 K/uL Final       Lab Results   Component Value Date    CHOL 110 03/26/2020       Lab Results   Component Value Date    HDL 33 (L) 03/26/2020       Lab Results   Component Value Date    LDLCALC 59 03/26/2020       Lab Results   Component Value Date    TRIG 92 03/26/2020       Lab Results   Component Value Date    CHOLHDL 3.33 03/26/2020       RPR   Date Value Ref Range Status   12/07/2022 Non-reactive Non-reactive Final     No results found for: QUANTIFERON    Medications:  Current Outpatient Medications on File Prior to Visit   Medication Sig Dispense Refill    cefTRIAXone (ROCEPHIN) 2 gram/50 mL IVPB Inject 50 mLs (2 g total) into the vein Daily. Inject via PICC line      DESCOVY 200-25 mg Tab Take 1 tablet by mouth once daily.      estradioL (ESTRACE) 1 MG tablet Take 1 mg by mouth once daily.      insulin aspart U-100 (NOVOLOG) 100 unit/mL injection Inject 10 Units into the skin 3 (three) times daily before meals.      spironolactone (ALDACTONE) 25 MG tablet Take 25 mg by mouth once daily.      insulin glargine 100 units/mL (3mL) SubQ pen Inject 50 Units into the skin every evening. (Patient taking differently: Inject 60 Units into the skin every evening.) 15 mL 0     No current facility-administered medications on file prior to visit.       Antibiotics:   Antibiotics (From admission, onward)      None            HIV: No components found for: HIV 1/2 AG/AB  Hepatitis C IgG: No components found for: HEPATITIS C  Syphilis:   RPR    Date Value Ref Range Status   12/07/2022 Non-reactive Non-reactive Final       Hepatitis A IgG: No components found for: HEPATITIS A IGG  Hepatitis Bc IgG: No components found for: HEPATITIS B CORE IGG  Hepatitis Bs IgG:  Quantiferon: No results found for: QUANTIFERON  VZV IgG: No components found for: VARICELLA IGG    No components found for: SEDIMENTATION RATE  No components found for: C-REACTIVE PROTEIN      Microbiology x 7d:   Microbiology Results (last 7 days)       ** No results found for the last 168 hours. **              There is no immunization history on file for this patient.      Reviewed records today as well as relevant labs, cultures, and imaging    Assessment:     Stump wound de-hiscense   Strep bacteremia  T1DM    Extremely medically complex  Patient is high risk for infectious complications given diabetes and non-compliance    Plan:     F/u with wound care([facility]). They will send deep wound cultures/bone swab and fax results to us    Hold antibiotics to increase yield of biopsy    Encouraged orthopedic f/u, pt refused (of note that I recently spoke with Dr Moreno and he was happy to offer same day appt for patient, but she did not go) .     F/u with [facility] for primary care and HIV prophylaxis/prevention      I have sent communication to the referring physician and/or primary care provider.     I spent a total of 40 minutes on the day of the visit. This includes face to face time and non-face to face time preparing to see the patient (eg, review of tests), obtaining and/or reviewing separately obtained history, documenting clinical information in the electronic or other health record, independently interpreting results, and communicating results to the patient/family/caregiver, or care coordination.      Ami Gillis MD, MPH  Infectious Disease

## 2023-03-02 ENCOUNTER — EXTERNAL HOME HEALTH (OUTPATIENT)
Dept: HOME HEALTH SERVICES | Facility: HOSPITAL | Age: 28
End: 2023-03-02
Payer: MEDICARE

## 2023-03-09 ENCOUNTER — TELEPHONE (OUTPATIENT)
Dept: INFECTIOUS DISEASES | Facility: CLINIC | Age: 28
End: 2023-03-09
Payer: MEDICARE

## 2023-03-09 DIAGNOSIS — M86.9 OSTEOMYELITIS, UNSPECIFIED SITE, UNSPECIFIED TYPE: Primary | ICD-10-CM

## 2023-03-09 RX ORDER — SULFAMETHOXAZOLE AND TRIMETHOPRIM 800; 160 MG/1; MG/1
1 TABLET ORAL 3 TIMES DAILY
Qty: 90 TABLET | Refills: 0 | Status: SHIPPED | OUTPATIENT
Start: 2023-03-09 | End: 2023-04-08

## 2023-03-09 NOTE — TELEPHONE ENCOUNTER
Returned patient call. No answer. Left voicemail and asked her to return call. Reviewed cultures from [facility].  Sent bactrim to pharmacy. Lab monitoring ordered. Asked MA/LPN to assist reaching pt as well      Orders Placed This Encounter   Procedures    BASIC METABOLIC PANEL     Standing Status:   Standing     Number of Occurrences:   3     Standing Expiration Date:   5/7/2024

## 2023-03-09 NOTE — TELEPHONE ENCOUNTER
----- Message from Nba Torres LPN sent at 3/9/2023  8:40 AM CST -----  pt would like to know cx results   ----- Message -----  From: Krishna Prabhakar  Sent: 3/9/2023   7:58 AM CST  To: Elis Mueller Staff    Type:  Test Results    Who Called: Patient  Name of Test (Lab/Mammo/Etc): lab(culture)  Date of Test: 3/1/23  Ordering Provider: Elis  Where the test was performed: [facility]  Would the patient rather a call back or a response via [facility]? Call back  Best Call Back Number: [phone]  Additional Information:

## 2023-03-13 PROBLEM — N17.9 AKI (ACUTE KIDNEY INJURY): Status: RESOLVED | Noted: 2020-06-11 | Resolved: 2023-03-13

## 2023-03-13 PROBLEM — A41.9 SEPSIS: Status: RESOLVED | Noted: 2022-12-05 | Resolved: 2023-03-13

## 2023-03-13 PROBLEM — J18.9 PNEUMONIA: Status: RESOLVED | Noted: 2022-12-05 | Resolved: 2023-03-13

## 2024-09-28 NOTE — DISCHARGE SUMMARY
[facility] Hospital Medicine  Discharge Summary      Patient Name: Pratik Torres  MRN: [id number]  Admission Date: 8/31/2018  Hospital Length of Stay: 1 days  Discharge Date and Time:  09/01/2018 9:07 AM  Attending Physician: Martha Lee MD   Discharging Provider: Martha Lee MD  Primary Care Provider: Primary Doctor No      HPI:   This is a 22 year old male with Type 1 DM who presents with a 1 day complaint of nausea, vomiting, and abdominal pain.  The patient denies unusual ingestions or sick contacts.  He denies non-compliance with medications and notes that his blood glucose is usually well controlled.  The patient admits diarrheal stools for about a month since he started taking Truvada per his PCP.  Upon arrival to the ED, the patient had a blood glucose of 668 and a lipase of 924.    * No surgery found *      Hospital Course:   The patient was aggressively hydrated and diet advanced.  The was started on basal insulin and blood glucose improved.  HBA1c obtained and was markedly elevated to 13.6.  The patient only takes insulin in a sliding scale fashion as instructed by his PCP.  The patient was given educational materials and there was an extensive discussion regarding appropriate management of his disease.  The patient was prescribed Lantus 15U QHS and is instructed to follow up with his PCP within a week.       Consults:     No new Assessment & Plan notes have been filed under this hospital service since the last note was generated.  Service: Hospital Medicine    Final Active Diagnoses:    Diagnosis Date Noted POA    PRINCIPAL PROBLEM:  Acute pancreatitis without infection or necrosis [K85.90] 08/31/2018 Yes    Type 1 diabetes mellitus with hyperglycemia [E10.65] 08/31/2018 Yes      Problems Resolved During this Admission:       Discharged Condition: good    Disposition: Home or Self Care    Follow Up:  Follow-up Information     Primary Doctor No In 1 week.               Patient  Instructions:      Diet diabetic     Activity as tolerated       Significant Diagnostic Studies: Labs:   CMP   Recent Labs   Lab  08/31/18   0758  09/01/18   0529   NA  134*  138   K  4.3  3.7   CL  95  104   CO2  27  23   GLU  668*  214*   BUN  11  5*   CREATININE  1.2  0.7   CALCIUM  9.8  8.9   PROT  8.3  6.9   ALBUMIN  4.1  3.2*   BILITOT  1.4*  1.7*   ALKPHOS  74  61   AST  11  13   ALT  11  11   ANIONGAP  12  11   ESTGFRAFRICA  >60  >60   EGFRNONAA  >60  >60    and CBC   Recent Labs   Lab  08/31/18   0758   WBC  6.61   HGB  13.5*   HCT  39.2*   PLT  263       Pending Diagnostic Studies:     None         Medications:  Reconciled Home Medications:      Medication List      START taking these medications    insulin glargine 100 unit/mL (3 mL) Inpn pen  Commonly known as:  LANTUS SOLOSTAR  Inject 15 Units into the skin every evening.        CONTINUE taking these medications    insulin lispro 100 unit/mL injection  Commonly known as:  HUMALOG  Inject into the skin 3 (three) times daily before meals.            Indwelling Lines/Drains at time of discharge:   Lines/Drains/Airways          None          Time spent on the discharge of patient: 20 minutes  Patient was seen and examined on the date of discharge and determined to be suitable for discharge.         Martha Lee MD  Department of Hospital Medicine  [facility]   pt reports being here two days ago for headache and was asked to come back if he has any back or abdominal pain, pt reports having lower back pain and abdominal pain for "a day and a half." denies nausea, vomiting and or diarrhea

## (undated) DEVICE — NDL 22GA X1 1/2 REG BEVEL

## (undated) DEVICE — Device

## (undated) DEVICE — PAD CAST SPECIALIST STRL 6

## (undated) DEVICE — PAD PREP 50/CA

## (undated) DEVICE — BLADE SAW OSC ME-92 COATED

## (undated) DEVICE — BOVIE SUCTION

## (undated) DEVICE — PAD ABD 8X10 STERILE

## (undated) DEVICE — CANISTER SUCTION 2 LTR

## (undated) DEVICE — GLOVE BIOGEL ORTHOPEDIC 8

## (undated) DEVICE — SUT VICRYL 2-0 36 CT-1

## (undated) DEVICE — GLOVE SURGICAL LATEX SZ 6.5

## (undated) DEVICE — TOURNIQUET SB QC DP 34X4IN

## (undated) DEVICE — PACK ARTHROSCOPY W/ISO BAC

## (undated) DEVICE — DRESSING GAUZE XEROFORM 5X9

## (undated) DEVICE — BANDAGE ACE ELASTIC 6"

## (undated) DEVICE — SEE MEDLINE ITEM 157148

## (undated) DEVICE — DRESSING ABTHERA ADV OPEN ABD

## (undated) DEVICE — PANTIES FEMININE NAPKIN LG/XLG

## (undated) DEVICE — BLANKET UPPER BODY 78.7X29.9IN

## (undated) DEVICE — SPONGE LAP 18X18 PREWASHED

## (undated) DEVICE — ELECTRODE REM PLYHSV RETURN 9

## (undated) DEVICE — GAUZE SPONGE 4X4 12PLY

## (undated) DEVICE — STAPLER SKIN ROTATING HEAD

## (undated) DEVICE — DRESSING SPONGE 16PLY 4X4 NS

## (undated) DEVICE — PULSAVAC ZIMMER

## (undated) DEVICE — SEE MEDLINE ITEM 154981

## (undated) DEVICE — TAPE SURG DURAPORE 2 X10YD

## (undated) DEVICE — TRAY MINOR GEN SURG

## (undated) DEVICE — SUT 0 18IN SILK BLK BRAIDE

## (undated) DEVICE — SYR ONLY LUER LOCK 20CC

## (undated) DEVICE — SPONGE DERMACEA GAUZE 4X4

## (undated) DEVICE — SOL IRR NACL .9% 3000ML

## (undated) DEVICE — SEE MEDLINE ITEM 146417

## (undated) DEVICE — LUBRICANT SURGILUBE 2 OZ

## (undated) DEVICE — BLADE SURG CARBON STEEL #10

## (undated) DEVICE — GLOVE SURGICAL LATEX SZ 8

## (undated) DEVICE — UNDERGLOVE BIOGEL PI SZ 6.5 LF

## (undated) DEVICE — SUT VICRYL PLUS 0 CT1 36IN

## (undated) DEVICE — APPLICATOR CHLORAPREP ORN 26ML

## (undated) DEVICE — BNDG COFLEX FOAM LF2 ST 6X5YD

## (undated) DEVICE — SEE MEDLINE ITEM 157110

## (undated) DEVICE — SEE MEDLINE ITEM 157117

## (undated) DEVICE — SEE MEDLINE ITEM 152622

## (undated) DEVICE — BANDAGE ELAS SOFTWRAP ST 6X5YD